# Patient Record
Sex: FEMALE | Race: WHITE | NOT HISPANIC OR LATINO | Employment: FULL TIME | URBAN - METROPOLITAN AREA
[De-identification: names, ages, dates, MRNs, and addresses within clinical notes are randomized per-mention and may not be internally consistent; named-entity substitution may affect disease eponyms.]

---

## 2017-05-15 RX ORDER — NORGESTIMATE AND ETHINYL ESTRADIOL 0.25-0.035
1 KIT ORAL DAILY
Qty: 84 TABLET | Refills: 0 | Status: SHIPPED | OUTPATIENT
Start: 2017-05-15 | End: 2017-08-08

## 2017-05-15 NOTE — TELEPHONE ENCOUNTER
----- Message from Cheyenne Hawthorne sent at 5/15/2017 11:20 AM CDT -----  Contact: Self  Pt is calling in regards of getting her RX MONONESSA, 28 refilled if possible. Pt isnt due for her annual til July. The pt would like her RX sent to [facility] on [address]. The pt can be reached at [phone]. Thanks KG

## 2017-05-18 ENCOUNTER — LAB VISIT (OUTPATIENT)
Dept: LAB | Facility: HOSPITAL | Age: 37
End: 2017-05-18
Attending: INTERNAL MEDICINE
Payer: COMMERCIAL

## 2017-05-18 ENCOUNTER — PATIENT MESSAGE (OUTPATIENT)
Dept: INTERNAL MEDICINE | Facility: CLINIC | Age: 37
End: 2017-05-18

## 2017-05-18 ENCOUNTER — OFFICE VISIT (OUTPATIENT)
Dept: INTERNAL MEDICINE | Facility: CLINIC | Age: 37
End: 2017-05-18
Payer: COMMERCIAL

## 2017-05-18 VITALS
WEIGHT: 229.25 LBS | BODY MASS INDEX: 34.75 KG/M2 | HEIGHT: 68 IN | DIASTOLIC BLOOD PRESSURE: 70 MMHG | SYSTOLIC BLOOD PRESSURE: 118 MMHG

## 2017-05-18 DIAGNOSIS — E66.9 OBESITY, CLASS II, BMI 35-39.9: ICD-10-CM

## 2017-05-18 DIAGNOSIS — Z00.00 ANNUAL PHYSICAL EXAM: ICD-10-CM

## 2017-05-18 DIAGNOSIS — Z00.00 ANNUAL PHYSICAL EXAM: Primary | ICD-10-CM

## 2017-05-18 PROBLEM — J30.1 SEASONAL ALLERGIC RHINITIS DUE TO POLLEN: Status: ACTIVE | Noted: 2017-05-18

## 2017-05-18 LAB
25(OH)D3+25(OH)D2 SERPL-MCNC: 39 NG/ML
ALBUMIN SERPL BCP-MCNC: 3.8 G/DL
ALP SERPL-CCNC: 46 U/L
ALT SERPL W/O P-5'-P-CCNC: 24 U/L
ANION GAP SERPL CALC-SCNC: 8 MMOL/L
AST SERPL-CCNC: 24 U/L
BASOPHILS # BLD AUTO: 0.02 K/UL
BASOPHILS NFR BLD: 0.4 %
BILIRUB SERPL-MCNC: 0.6 MG/DL
BUN SERPL-MCNC: 12 MG/DL
CALCIUM SERPL-MCNC: 9.6 MG/DL
CHLORIDE SERPL-SCNC: 106 MMOL/L
CHOLEST/HDLC SERPL: 2.7 {RATIO}
CO2 SERPL-SCNC: 26 MMOL/L
CREAT SERPL-MCNC: 0.8 MG/DL
DIFFERENTIAL METHOD: ABNORMAL
EOSINOPHIL # BLD AUTO: 0 K/UL
EOSINOPHIL NFR BLD: 0.7 %
ERYTHROCYTE [DISTWIDTH] IN BLOOD BY AUTOMATED COUNT: 13.6 %
EST. GFR  (AFRICAN AMERICAN): >60 ML/MIN/1.73 M^2
EST. GFR  (NON AFRICAN AMERICAN): >60 ML/MIN/1.73 M^2
GLUCOSE SERPL-MCNC: 82 MG/DL
HCT VFR BLD AUTO: 37.6 %
HDL/CHOLESTEROL RATIO: 37.1 %
HDLC SERPL-MCNC: 194 MG/DL
HDLC SERPL-MCNC: 72 MG/DL
HGB BLD-MCNC: 12.5 G/DL
LDLC SERPL CALC-MCNC: 107 MG/DL
LYMPHOCYTES # BLD AUTO: 2.7 K/UL
LYMPHOCYTES NFR BLD: 48.9 %
MCH RBC QN AUTO: 29.5 PG
MCHC RBC AUTO-ENTMCNC: 33.2 %
MCV RBC AUTO: 89 FL
MONOCYTES # BLD AUTO: 0.5 K/UL
MONOCYTES NFR BLD: 8.4 %
NEUTROPHILS # BLD AUTO: 2.3 K/UL
NEUTROPHILS NFR BLD: 41.6 %
NONHDLC SERPL-MCNC: 122 MG/DL
PLATELET # BLD AUTO: 293 K/UL
PMV BLD AUTO: 10 FL
POTASSIUM SERPL-SCNC: 4 MMOL/L
PROT SERPL-MCNC: 7.4 G/DL
RBC # BLD AUTO: 4.24 M/UL
SODIUM SERPL-SCNC: 140 MMOL/L
TRIGL SERPL-MCNC: 75 MG/DL
TSH SERPL DL<=0.005 MIU/L-ACNC: 1.35 UIU/ML
WBC # BLD AUTO: 5.6 K/UL

## 2017-05-18 PROCEDURE — 80053 COMPREHEN METABOLIC PANEL: CPT

## 2017-05-18 PROCEDURE — 36415 COLL VENOUS BLD VENIPUNCTURE: CPT

## 2017-05-18 PROCEDURE — 80061 LIPID PANEL: CPT

## 2017-05-18 PROCEDURE — 99395 PREV VISIT EST AGE 18-39: CPT | Mod: S$GLB,,, | Performed by: INTERNAL MEDICINE

## 2017-05-18 PROCEDURE — 84443 ASSAY THYROID STIM HORMONE: CPT

## 2017-05-18 PROCEDURE — 82306 VITAMIN D 25 HYDROXY: CPT

## 2017-05-18 PROCEDURE — 99999 PR PBB SHADOW E&M-EST. PATIENT-LVL III: CPT | Mod: PBBFAC,,, | Performed by: INTERNAL MEDICINE

## 2017-05-18 PROCEDURE — 85025 COMPLETE CBC W/AUTO DIFF WBC: CPT

## 2017-05-18 RX ORDER — PHENTERMINE HYDROCHLORIDE 37.5 MG/1
37.5 TABLET ORAL
Qty: 30 TABLET | Refills: 0 | Status: SHIPPED | OUTPATIENT
Start: 2017-05-18 | End: 2017-06-13 | Stop reason: SDUPTHER

## 2017-05-18 RX ORDER — LEVOCETIRIZINE DIHYDROCHLORIDE 5 MG/1
5 TABLET, FILM COATED ORAL NIGHTLY
Qty: 30 TABLET | Refills: 11 | Status: SHIPPED | OUTPATIENT
Start: 2017-05-18 | End: 2021-05-04

## 2017-05-18 NOTE — MR AVS SNAPSHOT
[facility]  [address]  [place] [address]  Phone: [phone]  Fax: [phone]                  Antonia Car   2017 11:00 AM   Office Visit    Description:  Female : 1980   Provider:  Cherry Matute MD   Department:  [facility]           Reason for Visit     Annual Exam           Diagnoses this Visit        Comments    Annual physical exam    -  Primary     Obesity, Class II, BMI 35-39.9                To Do List           Future Appointments        Provider Department Dept Phone    2017 11:50 AM LAB, APPOINTMENT [facility] [phone]      Goals (5 Years of Data)     None       These Medications        Disp Refills Start End    levocetirizine (XYZAL) 5 MG tablet 30 tablet 11 2017    Take 1 tablet (5 mg total) by mouth every evening. - Oral    Pharmacy: [facility] [address] [place], [place] [address] #: [phone]       phentermine (ADIPEX-P) 37.5 mg tablet 30 tablet 0 2017    Take 1 tablet (37.5 mg total) by mouth before breakfast. - Oral    Pharmacy: [facility] [address] [place], [place] [address] [facility] #: [phone]         [facility]     [facility] Care Line -  Assistance  Unless otherwise directed by your provider, please contact [facility], our nurse care line that is available for / assistance.     Registered nurses in the [facility] provide: appointment scheduling, clinical advisement, health education, and other advisory services.  Call: [phone] (toll free)               Medications           Message regarding Medications     Verify the changes and/or additions to your medication regime listed below are the same as discussed with your clinician today.  If any of these changes or additions are incorrect, please notify your  "healthcare provider.        START taking these NEW medications        Refills    levocetirizine (XYZAL) 5 MG tablet 11    Sig: Take 1 tablet (5 mg total) by mouth every evening.    Class: Normal    Route: Oral    phentermine (ADIPEX-P) 37.5 mg tablet 0    Sig: Take 1 tablet (37.5 mg total) by mouth before breakfast.    Class: Print    Route: Oral      STOP taking these medications     escitalopram oxalate (LEXAPRO) 10 MG tablet Take 1 tablet (10 mg total) by mouth every evening.    alprazolam (XANAX) 0.5 MG tablet Take 1 tablet (0.5 mg total) by mouth as directed. No more than 3 x weekly           Verify that the below list of medications is an accurate representation of the medications you are currently taking.  If none reported, the list may be blank. If incorrect, please contact your healthcare provider. Carry this list with you in case of emergency.           Current Medications     norgestimate-ethinyl estradiol (MONONESSA, 28,) 0.25-35 mg-mcg per tablet Take 1 tablet by mouth once daily.    levocetirizine (XYZAL) 5 MG tablet Take 1 tablet (5 mg total) by mouth every evening.    phentermine (ADIPEX-P) 37.5 mg tablet Take 1 tablet (37.5 mg total) by mouth before breakfast.           Clinical Reference Information           Your Vitals Were     BP Height Weight BMI       118/70 5' 8" (1.727 m) 104 kg (229 lb 4.5 oz) 34.86 kg/m2       Blood Pressure          Most Recent Value    BP  118/70      Allergies as of 5/18/2017     No Known Allergies      Immunizations Administered on Date of Encounter - 5/18/2017     None      Orders Placed During Today's Visit      Normal Orders This Visit    Ambulatory consult to Bariatric Surgery     Future Labs/Procedures Expected by Expires    CBC auto differential  5/18/2017 5/18/2018    Comprehensive metabolic panel  5/18/2017 5/18/2018    Lipid panel  5/18/2017 5/18/2018    TSH  5/18/2017 5/18/2018    Vitamin D  5/18/2017 (Approximate) 5/18/2018      Language Assistance Services  "    ATTENTION: Language assistance services are available, free of charge. Please call [phone].      ATENCIÓN: Si habla sammieañol, tiene a ortega disposición servicios gratuitos de asistencia lingüística. Llame al [phone].     CHÚ Ý: N?u b?n nói Ti?ng Vi?t, có các d?ch v? h? tr? ngôn ng? mi?n phí dành cho b?n. G?i s? [phone].         [facility] complies with applicable Federal civil rights laws and does not discriminate on the basis of race, color, national origin, age, disability, or sex.

## 2017-05-18 NOTE — PROGRESS NOTES
Subjective:       Patient ID: Antonia Car is a 37 y.o. female.    Chief Complaint: Annual Exam    HPI Comments: Annual exam    Overall doing well. 1 son Darrin age 9, healthy.  She works as a veterinary nurse.  Never smoked.    Main issue is her obesity.  This was discussed.  She has no apnea symptoms.  She would like to be seen in the bariatric clinic.  Lifestyle modification was discussed and we will do a one month medication for weight loss.    Labs to be obtained.    Patient Active Problem List:     Obesity, Class II, BMI 35-39.9     Fibromyalgia     Adjustment disorder with mixed anxiety and depressed mood     Seasonal allergic rhinitis due to pollen      Review of Systems   Constitutional: Negative for activity change, appetite change, chills, fatigue and fever.   HENT: Negative for nosebleeds, sinus pressure and sore throat.         Allergy sx   Eyes: Negative for visual disturbance.   Respiratory: Negative for apnea, cough, chest tightness, shortness of breath and wheezing.    Cardiovascular: Negative for chest pain, palpitations and leg swelling.   Gastrointestinal: Negative for abdominal distention, abdominal pain, anal bleeding, blood in stool, constipation, diarrhea, nausea and vomiting.   Genitourinary: Negative for dysuria, frequency, hematuria and vaginal bleeding.   Musculoskeletal: Negative for gait problem, joint swelling and myalgias.   Skin: Negative for rash.   Neurological: Negative for dizziness, tremors, weakness, light-headedness and headaches.   Hematological: Negative for adenopathy. Does not bruise/bleed easily.   Psychiatric/Behavioral: Negative for confusion, hallucinations, sleep disturbance and suicidal ideas.        Mood good       Objective:      Physical Exam   Constitutional: She is oriented to person, place, and time. She appears well-developed and well-nourished.   HENT:   Head: Normocephalic and atraumatic.   Right Ear: External ear normal.   Left Ear: External  ear normal.   Nose: Nose normal.   Mouth/Throat: Oropharynx is clear and moist. No oropharyngeal exudate.   Eyes: Conjunctivae and EOM are normal. No scleral icterus.   Neck: Normal range of motion. Neck supple. No JVD present. No thyromegaly present.   Cardiovascular: Normal rate, regular rhythm, normal heart sounds and intact distal pulses.  Exam reveals no gallop.    No murmur heard.  Pulmonary/Chest: Effort normal and breath sounds normal. No respiratory distress. She has no wheezes. She exhibits no tenderness.   Abdominal: Soft. Bowel sounds are normal. She exhibits no distension and no mass. There is no tenderness. There is no rebound and no guarding.   Musculoskeletal: Normal range of motion. She exhibits no edema or tenderness.   Lymphadenopathy:     She has no cervical adenopathy.   Neurological: She is alert and oriented to person, place, and time. No cranial nerve deficit. Coordination normal.   Skin: Skin is warm. No rash noted. No erythema.   Psychiatric: She has a normal mood and affect. Her behavior is normal. Judgment and thought content normal.   Nursing note and vitals reviewed.      Assessment:       1. Annual physical exam    2. Obesity, Class II, BMI 35-39.9        Plan:         Annual physical exam  -     Comprehensive metabolic panel; Future; Expected date: 5/18/17  -     CBC auto differential; Future; Expected date: 5/18/17  -     Lipid panel; Future; Expected date: 5/18/17  -     Vitamin D; Future; Expected date: 5/18/17  -     TSH; Future; Expected date: 5/18/17    Obesity, Class II, BMI 35-39.9  -     Ambulatory consult to Bariatric Surgery    Other orders  -     levocetirizine (XYZAL) 5 MG tablet; Take 1 tablet (5 mg total) by mouth every evening.  Dispense: 30 tablet; Refill: 11  -     phentermine (ADIPEX-P) 37.5 mg tablet; Take 1 tablet (37.5 mg total) by mouth before breakfast.  Dispense: 30 tablet; Refill: 0.  Cautions and side effects reviewed including hypertension, tachycardia,  anxiety.  May try half pills or alternating days, follow-up poor results.  Keep bariatric clinic follow-up.    lifestyle modification, portion control, exercise, hydration reviewed  Keep a food diary before going to bariatric clinic  No VIOLETTA symptoms    I will review all studies and determine further tx depending on findings    Anticipate annual follow-up with me unless need arises sooner    T judy recommended

## 2017-05-18 NOTE — PATIENT INSTRUCTIONS
Phentermine tablets or capsules  What is this medicine?  PHENTERMINE (FEN ter meen) decreases your appetite. It is used with a reduced calorie diet and exercise to help you lose weight.  How should I use this medicine?  Take this medicine by mouth with a glass of water. Follow the directions on the prescription label. The instructions for use may differ based on the product and dose you are taking. Avoid taking this medicine in the evening. It may interfere with sleep. Take your doses at regular intervals. Do not take your medicine more often than directed.  Talk to your pediatrician regarding the use of this medicine in children. While this drug may be prescribed for children 17 years or older for selected conditions, precautions do apply.  What side effects may I notice from receiving this medicine?  Side effects that you should report to your doctor or health care professional as soon as possible:  · chest pain, palpitations  · depression or severe changes in mood  · increased blood pressure  · irritability  · nervousness or restlessness  · severe dizziness  · shortness of breath  · problems urinating  · unusual swelling of the legs  · vomiting  Side effects that usually do not require medical attention (report to your doctor or health care professional if they continue or are bothersome):  · blurred vision or other eye problems  · changes in sexual ability or desire  · constipation or diarrhea  · difficulty sleeping  · dry mouth or unpleasant taste  · headache  · nausea  What may interact with this medicine?  Do not take this medicine with any of the following medications:  · duloxetine  · MAOIs like Carbex, Eldepryl, Marplan, Nardil, and Parnate  · medicines for colds or breathing difficulties like pseudoephedrine or phenylephrine  · procarbazine  · sibutramine  · SSRIs like citalopram, escitalopram, fluoxetine, fluvoxamine, paroxetine, and sertraline  · stimulants like dexmethylphenidate, methylphenidate or  modafinil  · venlafaxine  This medicine may also interact with the following medications:  · medicines for diabetes  What if I miss a dose?  If you miss a dose, take it as soon as you can. If it is almost time for your next dose, take only that dose. Do not take double or extra doses.  Where should I keep my medicine?  Keep out of the reach of children. This medicine can be abused. Keep your medicine in a safe place to protect it from theft. Do not share this medicine with anyone. Selling or giving away this medicine is dangerous and against the law.  This medicine may cause accidental overdose and death if taken by other adults, children, or pets. Mix any unused medicine with a substance like cat litter or coffee grounds. Then throw the medicine away in a sealed container like a sealed bag or a coffee can with a lid. Do not use the medicine after the expiration date.  Store at room temperature between 20 and 25 degrees C (68 and 77 degrees F). Keep container tightly closed.  What should I tell my health care provider before I take this medicine?  They need to know if you have any of these conditions:  · agitation  · glaucoma  · heart disease  · high blood pressure  · history of substance abuse  · lung disease called Primary Pulmonary Hypertension (PPH)  · taken an MAOI like Carbex, Eldepryl, Marplan, Nardil, or Parnate in last 14 days  · thyroid disease  · an unusual or allergic reaction to phentermine, other medicines, foods, dyes, or preservatives  · pregnant or trying to get pregnant  · breast-feeding  What should I watch for while using this medicine?  Notify your physician immediately if you become short of breath while doing your normal activities.  Do not take this medicine within 6 hours of bedtime. It can keep you from getting to sleep. Avoid drinks that contain caffeine and try to stick to a regular bedtime every night.  This medicine was intended to be used in addition to a healthy diet and exercise. The  best results are achieved this way. This medicine is only indicated for short-term use. Eventually your weight loss may level out. At that point, the drug will only help you maintain your new weight. Do not increase or in any way change your dose without consulting your doctor.  You may get drowsy or dizzy. Do not drive, use machinery, or do anything that needs mental alertness until you know how this medicine affects you. Do not stand or sit up quickly, especially if you are an older patient. This reduces the risk of dizzy or fainting spells. Alcohol may increase dizziness and drowsiness. Avoid alcoholic drinks.  Date Last Reviewed:   NOTE:This sheet is a summary. It may not cover all possible information. If you have questions about this medicine, talk to your doctor, pharmacist, or health care provider. Copyright© 2016 [employer] Standard

## 2017-06-13 RX ORDER — PHENTERMINE HYDROCHLORIDE 37.5 MG/1
37.5 TABLET ORAL
Qty: 30 TABLET | Refills: 0 | Status: SHIPPED | OUTPATIENT
Start: 2017-06-13 | End: 2017-07-13

## 2017-06-23 ENCOUNTER — INITIAL CONSULT (OUTPATIENT)
Dept: BARIATRICS | Facility: CLINIC | Age: 37
End: 2017-06-23
Payer: COMMERCIAL

## 2017-06-23 VITALS
WEIGHT: 223.31 LBS | HEIGHT: 68 IN | SYSTOLIC BLOOD PRESSURE: 118 MMHG | DIASTOLIC BLOOD PRESSURE: 76 MMHG | HEART RATE: 82 BPM | BODY MASS INDEX: 33.84 KG/M2

## 2017-06-23 DIAGNOSIS — E66.9 OBESITY (BMI 30.0-34.9): Primary | ICD-10-CM

## 2017-06-23 PROCEDURE — 99999 PR PBB SHADOW E&M-EST. PATIENT-LVL III: CPT | Mod: PBBFAC,,, | Performed by: INTERNAL MEDICINE

## 2017-06-23 PROCEDURE — 99244 OFF/OP CNSLTJ NEW/EST MOD 40: CPT | Mod: S$GLB,,, | Performed by: INTERNAL MEDICINE

## 2017-06-23 RX ORDER — DIETHYLPROPION HYDROCHLORIDE 75 MG/1
75 TABLET, EXTENDED RELEASE ORAL DAILY
Qty: 30 TABLET | Refills: 0 | Status: SHIPPED | OUTPATIENT
Start: 2017-06-23 | End: 2017-08-17

## 2017-06-23 NOTE — PATIENT INSTRUCTIONS
Intermittent fasting. NO eating from 8 pm to 12pm. Can have water, tea, coffee in that time without sweeteners.    2 meals made up of the following:  Unlimited green vegetables, tomatoes, mushrooms, spaghetti squash, cauliflower, meat, poultry, seafood, eggs and hard cheeses.   Avoid fried foods  Dressings, seasonings, condiments, etc should have less than 2 g sugars.   Beans or nuts can have 1 x a day.   1-2 servings of citrus fruits, berries, pineapple or melon a day (1/2 cup)  Milk and plain yogurt    No soda, sweet tea, juices or lemonade    No grains, rice, pasta, potatoes or bread.     [email or web address] for info on intermittent fasting.       Start phentermine with 1/2 pill twice a day to see if that will control your appetite.       When done with phentermine Rx start diethylpropion    Patient warned of common side effects of diethylpropion including anxiety, insomnia, palpitations and increased blood pressure. It was also explained that it is for short-term usage along with diet and exercise, and that stopping the medication without making lifestyle changes will result in regain of weight. Patient states understanding.      Increase exercise to 3 days a week this month.

## 2017-06-23 NOTE — PROGRESS NOTES
Subjective:       Patient ID: Antonia Car is a 37 y.o. female.    Chief Complaint: Consult    Current attempts at weight loss: New pt to me, referred by Cherry Matute MD  [address]  [place] [address]  With Patient Active Problem List:     Obesity, Class II, BMI 35-39.9     Fibromyalgia     Adjustment disorder with mixed anxiety and depressed mood     Seasonal allergic rhinitis due to pollen    Has been on phentermine with Cherry Matute MD for the past 6 weeks and lost about 6 lbs. She feels it has helped, but wears off in afternoon. Denies SE.   Walks and elliptical 1-2 times a week for 45 min.     Previous diet attempts:  Advocare, Body by VI. OTC supplements. Krav Stephan. Cutting back soda and fried foods.     Heaviest weight: 254#    Lightest weight: 164#    Goal weight: 180#    History of medication for loss: Phentermine as above. Belviq- gave headaches      Typical eating patterns:  and 10 yo son. Pt does cooking.   Breakfast:  Skips. Occasional eggs.     Lunch: Salad or leftovers. Weekends- pizza, maybe salad if available.     Dinner: Eats out more- blackened catfish with peas and salad. Shrimp and fish sometimes fried or pasta with garlic butter.     Snacks: smart pop, belvita, pineapple.     Beverages: water, wine- 3-4 glasses/week. Occasional Coke. Rohini tea with splenda.     Willingness to change: 10/10    EKG:Sinus bradycardia  Otherwise normal ECG  No previous ECGs available    BMR: [id number]        Review of Systems   Constitutional: Negative for chills and fever.   Respiratory: Negative for shortness of breath.         + snores   Cardiovascular: Positive for leg swelling. Negative for chest pain.        And hands   Gastrointestinal: Positive for constipation. Negative for diarrhea.        Occasional HB   Genitourinary: Negative for difficulty urinating and menstrual problem.        On OCPS   Musculoskeletal: Positive for arthralgias and back pain.        Knees and  "upper back   Neurological: Negative for dizziness and light-headedness.   Psychiatric/Behavioral: Negative for dysphoric mood. The patient is nervous/anxious.        Objective:     /76   Pulse 82   Ht 5' 8" (1.727 m)   Wt 101.3 kg (223 lb 5.2 oz)   BMI 33.96 kg/m²     Physical Exam   Constitutional: She is oriented to person, place, and time. She appears well-developed. No distress.   Obese   HENT:   Head: Normocephalic and atraumatic.   Mouth/Throat: No oropharyngeal exudate.   Eyes: EOM are normal. Pupils are equal, round, and reactive to light. No scleral icterus.   Neck: Normal range of motion. Neck supple. No thyromegaly present.   Cardiovascular: Normal rate and normal heart sounds.  Exam reveals no gallop and no friction rub.    No murmur heard.  Pulmonary/Chest: Effort normal and breath sounds normal. No respiratory distress. She has no wheezes.   Abdominal: Soft. Bowel sounds are normal. She exhibits no distension. There is no tenderness.   Musculoskeletal: Normal range of motion. She exhibits no edema.   Neurological: She is alert and oriented to person, place, and time. No cranial nerve deficit.   Skin: Skin is warm and dry. No erythema.   Psychiatric: She has a normal mood and affect. Her behavior is normal. Judgment normal.   Vitals reviewed.      Assessment:       1. Obesity (BMI 30.0-34.9)        Plan:         Antonia was seen today for consult.    Diagnoses and all orders for this visit:    Obesity (BMI 30.0-34.9)  -     diethylpropion 75 mg TbSR; Take 75 mg by mouth once daily.      Intermittent fasting. NO eating from 8 pm to 12pm. Can have water, tea, coffee in that time without sweeteners.    2 meals made up of the following:  Unlimited green vegetables, tomatoes, mushrooms, spaghetti squash, cauliflower, meat, poultry, seafood, eggs and hard cheeses.   Avoid fried foods  Dressings, seasonings, condiments, etc should have less than 2 g sugars.   Beans or nuts can have 1 x a day.   1-2 " servings of citrus fruits, berries, pineapple or melon a day (1/2 cup)  Milk and plain yogurt    No soda, sweet tea, juices or lemonade    No grains, rice, pasta, potatoes or bread.     [email or web address] for info on intermittent fasting.       Start phentermine with 1/2 pill twice a day to see if that will control your appetite.       When done with phentermine Rx start diethylpropion    Patient warned of common side effects of diethylpropion including anxiety, insomnia, palpitations and increased blood pressure. It was also explained that it is for short-term usage along with diet and exercise, and that stopping the medication without making lifestyle changes will result in regain of weight. Patient states understanding.      Increase exercise to 3 days a week this month.

## 2017-06-23 NOTE — LETTER
June 23, 2017      Cherry Matute MD  [address]  [facility] [address]           [facility]  [address]  [facility] [address]  Phone: [phone]  Fax: [phone]          Patient: Antonia Car   MR Number: [id number]   YOB: 1980   Date of Visit: 6/23/2017       Dear Dr. Cherry Matute:    Thank you for referring Antonia Car to me for evaluation. Attached you will find relevant portions of my assessment and plan of care.    If you have questions, please do not hesitate to call me. I look forward to following Antonia Car along with you.    Sincerely,    Tahira Cervantes MD    Enclosure  CC:  No Recipients    If you would like to receive this communication electronically, please contact [email or web address] or [phone] to request more information on [employer] Link access.    For providers and/or their staff who would like to refer a patient to [facility], please contact us through our one-stop-shop provider referral line, [facility] [place], at [phone].    If you feel you have received this communication in error or would no longer like to receive these types of communications, please e-mail [email or web address]

## 2017-06-23 NOTE — LETTER
[facility]  [address]  [facility] [address]  Phone: [phone]  Fax: [phone] June 23, 2017      Cherry Matute MD  [address]  [facility] [address]    Patient: Antonia Car   MR Number: [id number]   YOB: 1980   Date of Visit: 6/23/2017     Dear Dr. Matute:    Thank you for referring Antonia Car to me for evaluation. Below are the relevant portions of my assessment and plan of care.    Assessment:  1. Obesity (BMI 30.0-34.9)      Plan: Start Phentermine with 1/2 pill twice a day to see if that will control your appetite. When done with Phentermine RX start Diethylpropion.    Diethylpropion 75 mg TbSR; Take 75 mg by mouth once daily.     Patient warned of common side effects of Diethylpropion including anxiety, insomnia, palpitations and increased blood pressure. It was also explained that it is for short-term usage along with diet and exercise, and that stopping the medication without making lifestyle changes will result in regain of weight. Patient states understanding.     The patient was given individualized diet, exercise, and follow-up instructions.       If you have questions, please do not hesitate to call me. I look forward to following Antonia along with you.    Sincerely,      Tahira Cervantes MD   Medical Weight Loss   [facility]     BIBI/thania

## 2017-06-25 ENCOUNTER — PATIENT MESSAGE (OUTPATIENT)
Dept: BARIATRICS | Facility: CLINIC | Age: 37
End: 2017-06-25

## 2017-07-27 ENCOUNTER — PATIENT MESSAGE (OUTPATIENT)
Dept: BARIATRICS | Facility: CLINIC | Age: 37
End: 2017-07-27

## 2017-08-08 ENCOUNTER — OFFICE VISIT (OUTPATIENT)
Dept: OBSTETRICS AND GYNECOLOGY | Facility: CLINIC | Age: 37
End: 2017-08-08
Payer: COMMERCIAL

## 2017-08-08 VITALS
WEIGHT: 221.81 LBS | HEIGHT: 68 IN | SYSTOLIC BLOOD PRESSURE: 100 MMHG | BODY MASS INDEX: 33.62 KG/M2 | DIASTOLIC BLOOD PRESSURE: 68 MMHG

## 2017-08-08 DIAGNOSIS — Z01.419 VISIT FOR GYNECOLOGIC EXAMINATION: Primary | ICD-10-CM

## 2017-08-08 DIAGNOSIS — Z30.41 ENCOUNTER FOR SURVEILLANCE OF CONTRACEPTIVE PILLS: ICD-10-CM

## 2017-08-08 PROCEDURE — 99999 PR PBB SHADOW E&M-EST. PATIENT-LVL III: CPT | Mod: PBBFAC,,, | Performed by: OBSTETRICS & GYNECOLOGY

## 2017-08-08 PROCEDURE — 99395 PREV VISIT EST AGE 18-39: CPT | Mod: S$GLB,,, | Performed by: OBSTETRICS & GYNECOLOGY

## 2017-08-08 NOTE — PROGRESS NOTES
HISTORY OF PRESENT ILLNESS:    Antonia Car is a 37 y.o. female, , Patient's last menstrual period was 2017 (exact date).,  presents for a routine exam and has no complaints. PAP DUE 2018.  REFILL OCP BUT WILL CHANGE TO NECON FOR BETTER CYCLE CONTROL - HAS HAD SOME BTB ON CURRENT PILL OVER THE PAST 6-7 MO    LAST VISIT 2016:   PAP DUE  AND REFILL OCP - MONONESSA.  NO SIGNIF GYN ISSUES AT THIS TIME.  SWITCHING LAUNDRY DETERG AND FEELS IMPROVED FROM LAST YEAR.  TO [place] SOON AND ANNIVERSARY TRIP IN November TO [place] . .   LAST VISIT 2015:  SEVERAL ISSUES:  IN PAST HAD OV CYST, THINKS RECURRENT DUE TO TWINGES ON LEFT SIDE - IN PAST BEATRIS HAD DX FUNCTIONAL CYSTS AND THIS FEELS SIMILAR. WILL F/U WITH PELVIC USG. DISCUSSED LOW-DOSE OCP SUPPRESS MOST BUT NOT ALL OVARIAN ACTIVITY; USUALLY DOES WELL WITH CURRENT OCP AND WILL NOT CHANGE, BUT IF RECURRENT EPISODES IN THE FUTURE MIGHT CONSIDER A STRONGER PILL  ABD PAIN, 2 EPISODES, 4 WEEKS AGO AND LAST WEEK, LASTED ABOUT 10 MIN, MIDLINE. HAS HX FIBROMYALGIA AND HARD TO TEASE APART WHAT THAT MIGHT BE CONTRIBUTING, BUT WILL CHECK PELVIC USG AND ADVIS CONT OCP  FEELS LIGHTHEADED ASSOCIATED WITH SEVERE PAIN EPISODES - DISCUSSED    IRREG MENSES PAST 2 MONTHS. LMP 5/5, 3-4 DAYS WITH 2 HEAVY. 4/EARLY ALSO HAD MENSES. HAD MIDCYCLE SPOTTING OVER THE PAST 2 MONTHS    URINE CULTURE SUBMITTED FOR UTI SX OVER THE PAST 3 DAYS; EMPIRIC ABX MACROBID  PAP SUBMITTED, DISCUSSED SCREENING  SPOUSE WITH RECENT INFIDELITY AND WANTS STD SCREEN FOR [facility]    Past Medical History:   Diagnosis Date    Abnormal Pap smear     ABN pap ~19 yo s/p laser cervix and since then all pap has been normal seen only Dr. Luna since    GERD (gastroesophageal reflux disease)     Seasonal allergic rhinitis due to pollen 2017       Past Surgical History:   Procedure Laterality Date     SECTION         MEDICATIONS AND ALLERGIES:      Current Outpatient  Prescriptions:     diethylpropion 75 mg TbSR, Take 75 mg by mouth once daily., Disp: 30 tablet, Rfl: 0    levocetirizine (XYZAL) 5 MG tablet, Take 1 tablet (5 mg total) by mouth every evening., Disp: 30 tablet, Rfl: 11    norethindrone-ethinyl estradiol (NECON) 0.5-35 mg-mcg per tablet, Take 1 tablet by mouth once daily., Disp: 84 tablet, Rfl: 3    Review of patient's allergies indicates:  No Known Allergies    Family History   Problem Relation Age of Onset    Cancer Maternal Grandmother      Brain    Diabetes Maternal Grandmother     Diabetes Father     Hypertension Father     Heart disease Father      A fib    Lymphoma Mother 60    Diabetes Mother     Hypertension Mother     Cancer Mother      follicular lymphoma, B lymphoma x 2    Depression Mother     No Known Problems Son     Clotting disorder Sister     Thyroid disease Sister     Breast cancer Neg Hx     Colon cancer Neg Hx     Ovarian cancer Neg Hx        Social History     Social History    Marital status:      Spouse name: N/A    Number of children: 1    Years of education: N/A     Occupational History    Not on file.     Social History Main Topics    Smoking status: Never Smoker    Smokeless tobacco: Never Used    Alcohol use No      Comment: rarely    Drug use: No    Sexual activity: Yes     Partners: Male     Birth control/ protection: OCP     Other Topics Concern    Not on file     Social History Narrative    ** Merged History Encounter **            COMPREHENSIVE GYN HISTORY:  PAP History: Denies abnormal Paps.  Infection History: Denies STDs. Denies PID.  Benign History: Denies uterine fibroids. Denies ovarian cysts. Denies endometriosis. Denies other conditions.  Cancer History: Denies cervical cancer. Denies uterine cancer or hyperplasia. Denies ovarian cancer. Denies vulvar cancer or pre-cancer. Denies vaginal cancer or pre-cancer. Denies breast cancer. Denies colon cancer.  Sexual Activity History: Reports  "currently being sexually active  Menstrual History: Monthly, mild-moderate.  Contraception:oral contraceptives (estrogen/progesterone)    ROS:  GENERAL: No weight changes. No swelling. No fatigue. No fever.  CARDIOVASCULAR: No chest pain. No shortness of breath. No leg cramps.   NEUROLOGICAL: No headaches. No vision changes.  BREASTS: No pain. No lumps. No discharge.  ABDOMEN: No pain. No nausea. No vomiting. No diarrhea. No constipation.  REPRODUCTIVE: No abnormal bleeding.   VULVA: No pain. No lesions. No itching.  VAGINA: No relaxation. No itching. No odor. No discharge. No lesions.  URINARY: No incontinence. No nocturia. No frequency. No dysuria.    /68   Ht 5' 8" (1.727 m)   Wt 100.6 kg (221 lb 12.5 oz)   LMP 07/12/2017 (Exact Date)   BMI 33.72 kg/m²     PE:  APPEARANCE: Well nourished, well developed, in no acute distress.  AFFECT: WNL, alert and oriented x 3.  SKIN: No acne or hirsutism.  NECK: Neck symmetric, without masses or thyromegaly.  NODES: No inguinal, cervical, axillary or femoral lymph node enlargement.  CHEST: Good respiratory effort.   ABDOMEN: Soft. No tenderness or masses. No hepatosplenomegaly. No hernias.  BREASTS: Symmetrical, no skin changes, visible lesions, palpable masses or nipple discharge bilaterally.  PELVIC: External female genitalia without lesions.  Female hair distribution. Adequate perineal body, Normal urethral meatus. Vagina moist and well rugated without lesions or discharge.  No significant cystocele or rectocele present. Cervix pink without lesions, discharge or tenderness. Uterus is normal size, regular, mobile and nontender. Adnexa without masses or tenderness.  EXTREMITIES: No edema    PROCEDURES:      DIAGNOSIS:  1. Visit for gynecologic examination     2. Encounter for surveillance of contraceptive pills         LABS AND TESTS ORDERED:    MEDICATIONS PRESCRIBED:    COUNSELING:   The patient was counseled today on ACS PAP guidelines, with recommendations for " yearly pelvic exams unless their uterus, cervix, and ovaries were removed for benign reasons; in that case, examinations every 3-5 years are recommended.  The patient was also counseled regarding monthly breast self-examination, routine STD screening for at-risk populations, prophylactic immunizations for transmitted infections such as  HPV, Pertussis, or Influenza as appropriate, and yearly mammograms when indicated by ACS guidelines.  She was advised to see her primary care physician for all other health maintenance.    FOLLOW-UP with me annually.

## 2017-08-17 ENCOUNTER — OFFICE VISIT (OUTPATIENT)
Dept: BARIATRICS | Facility: CLINIC | Age: 37
End: 2017-08-17
Payer: COMMERCIAL

## 2017-08-17 VITALS
HEART RATE: 76 BPM | HEIGHT: 68 IN | DIASTOLIC BLOOD PRESSURE: 72 MMHG | BODY MASS INDEX: 33.71 KG/M2 | WEIGHT: 222.44 LBS | SYSTOLIC BLOOD PRESSURE: 110 MMHG

## 2017-08-17 DIAGNOSIS — E66.9 OBESITY (BMI 30.0-34.9): Primary | ICD-10-CM

## 2017-08-17 PROBLEM — E66.811 OBESITY (BMI 30.0-34.9): Status: ACTIVE | Noted: 2017-08-17

## 2017-08-17 PROCEDURE — 3008F BODY MASS INDEX DOCD: CPT | Mod: S$GLB,,, | Performed by: INTERNAL MEDICINE

## 2017-08-17 PROCEDURE — 99213 OFFICE O/P EST LOW 20 MIN: CPT | Mod: S$GLB,,, | Performed by: INTERNAL MEDICINE

## 2017-08-17 PROCEDURE — 99999 PR PBB SHADOW E&M-EST. PATIENT-LVL III: CPT | Mod: PBBFAC,,, | Performed by: INTERNAL MEDICINE

## 2017-08-17 RX ORDER — TOPIRAMATE 50 MG/1
50 CAPSULE, EXTENDED RELEASE ORAL DAILY
Qty: 30 CAPSULE | Refills: 2 | Status: SHIPPED | OUTPATIENT
Start: 2017-08-17 | End: 2017-10-19

## 2017-08-17 NOTE — PATIENT INSTRUCTIONS
Patient was informed that topiramate is used for migraine prevention and seizures. Weight loss is a common side effect that is well documented. She understands this. She was informed of the potential side effects such as serious and possibly fatal rash in which case the medication should be discontinued immediately. Paresthesias, forgetfulness, fatigue, kidney stones, GI symptoms, and changes in lab values such as electrolytes, blood counts and kidney function.    Intermittent fasting. NO eating from 8 pm to 12pm. Can have water, tea, coffee in that time without sweeteners.    2 meals made up of the following:  Unlimited green vegetables, tomatoes, mushrooms, spaghetti squash, cauliflower, meat, poultry, seafood, eggs and hard cheeses.   Avoid fried foods  Dressings, seasonings, condiments, etc should have less than 2 g sugars.   Beans or nuts can have 1 x a day.   1-2 servings of citrus fruits, berries, pineapple or melon a day (1/2 cup)  Milk and plain yogurt    No soda, sweet tea, juices or lemonade    No grains, rice, pasta, potatoes or bread.     [email or web address].[email or web address] for info on intermittent fasting.       Lower Carb Comfort Food Dupes      Skinny Bell Pepper Armando Boats  Yields: 18 boats  Servin boats  Calories: 145  Total Fat: 9g  Saturated Fat: 4g  Trans Fat: 0g  Cholesterol: 50mg  Sodium: 293mg  Carbohydrates: 4g  Fiber: 1g  Sugars: 2g  Protein: 13g  SmartPoints: 4     Ingredients   1 pound lean ground turkey   1 teaspoons chili powder   1 teaspoon cumin   1/2 teaspoon black pepper   1/4 teaspoon kosher or sea salt   3/4 cup salsa, no sugar added   1 cup grated cheddar cheese, reduced-fat   3 bell peppers  Directions  Remove seeds, core, and membrane from bell peppers then slice each one into 6 verticle pieces where they dip down. Set sliced bell peppers aside.  Cook ground turkey over medium-high heat, breaking up as it cooks. Cook until the turkey loses it's pink color and is cooked  through. Drain off any fat.  Preheat oven to 375 degrees.  Combine cooked turkey with spices and salsa. Evenly distribute mixture into the bell pepper boats, top with cheese. Bake on a parchment lined baking sheet for 10 minutes or until cheese is melted and peppers are hot.  NOTE: If you prefer much softer bell peppers, add a few tablespoons water to the bottom of a large casserole dish, add filled nachos, cover tightly with foil and bake 15 minutes.  Remove from the oven and add additional toppings, If desired.  Optional ingredients: sliced Jalepeno peppers, diced avocado, fat-free Greek yogurt or sour cream, or sliced green onions.    [place] Chicken Spaghetti Squash  Yields: 4 servings  Calories: 457  Total Fat: 23g  Saturated Fat: 8g  Trans Fat: 0g  Cholesterol: 201mg  Sodium: 1146mg  Carbohydrates: 19g  Fiber: 4g  Sugar: 9g  Protein: 44g  SmartPoints: 13    Ingredients   1 large spaghetti squash   1 small onion, diced   2 medium carrots, diced   2 celery stalks, diced   1 pound cooked chicken, shredded   1/2 cup hot sauce   1/4 cup Homemade Ranch dressing   1/2 teaspoon garlic powder   salt and pepper to taste   1 cup low-fat shredded cheddar cheese   2 eggs   1/4 cup green onion, chopped  Directions  Preheat oven to 400 degrees F and spray a baking sheet with cooking spray.  Slice your spaghetti squash in half lengthwise and scoop out the seeds, then spray the cut side of the squash with a little olive oil cooking spray and place cut side down on the baking pan.  Roast spaghetti squash for 30-45 minutes or until it is tender.  While the squash is cooking, sauté the onion, celery, and carrots until softened and mostly cooked through.  In a large bowl, combine the sauteed vegetables, chicken, hot sauce, ranch dressing, garlic powder, salt, pepper, eggs, and cheese.  Once the squash is cooked through, allow to cool slightly then use a fork to scrape the insides into your chicken mixture,  making sure not to tear the skins.  Make sure that the spaghetti squash is well incorporated with the other ingredients, then divide the mixture between the spaghetti squash halves.  Bake at 350 degrees for 30-35 minutes, or until hot and bubbly.  Remove from the oven and garnish with the green onions and additional ranch or hot sauce if desired.    Sasha Zucchini Boats  Servings: 8 zucchini boats  Ingredients   Report this ad    4 medium zucchini (2 1/2 lbs), sliced into halves through the length*   1 cup (8.6 oz) part-skim ricotta cheese   1 large egg   1 1/2 Tbsp chopped fresh parsley , plus more for garnish   1 1/4 cups (5 oz) shredded mozzarella cheese   1/2 cup (2 oz) finely shredded parmesan cheese   8 oz 93% lean ground beef or lean ground turkey   4 tsp olive oil , divided   Salt and freshly ground black pepper   1 3/4 cup roasted garlic marinara sauce (low sugar)   1 Tbsp chopped fresh basil , plus more for garnish  Instructions  1. Preheat oven to 400 degrees. Using a spoon, scoop centers from zucchini while leaving a 1/4-inch rim to create boats. Set aside.  2. In a mixing bowl stir together ricotta cheese, egg and 1 1/2 Tbsp of the parsley. Season lightly with salt and pepper. Stir in 1/2 cup of the mozzarella cheese and the parmesan cheese. Set aside.  3. Heat 2 tsp of the olive oil in a large non-stick skillet over medium-high heat. Crumble beef into pan, season with salt and pepper and cook, stirring occasionally and breaking up beef when stirring, until browned (there shouldn't be any excess fat but if you happened to use a fattier beef then just drain excess rendered fat). Stir in marinara sauce and 1 Tbsp of the basil, remove from heat.  4. To assemble boats, brush both sides of of zucchini lightly with remaining 2 tsp olive oil and place in two baking pans (I used a 13 by 9 and a 9 by 9). Divide cheese mixture among zucchini spooning about 2 1/2 Tbsp into each, then spread cheese  mixture into and even layer. Divide sauce among zucchini adding a few heaping spoonfuls to each. Cover baking dishes with foil and place in oven side by side and bake in preheated oven 30 minutes. Remove from oven, sprinkle tops with remaining 3/4 cup mozzarella, return to oven and bake until cheese has melted and zucchini is tender, about 5 minutes. Sprinkle tops with with fresh basil and parsley and serve warm.  5. *Look for zucchini that is wider and more uniform in width. The skinnier zucchini won't fit much filling.  6. Recipe source: [employer]    Chicken Avocado Lime Soup  Prep Time: 15 minutes  Cook Time: 20 minutes  Ingredients   Report this ad    1 1/2 lbs boneless skinless chicken breasts*   1 Tbsp olive oil   1 cup chopped green onions (including whites, mince the whites)   2 jalapeños , seeded and minced (leave seeds if you want soup spicy, omit if you don't like heat)   2 cloves garlic , minced   4 (14.5 oz) cans low-sodium chicken broth   2 Elvira tomatoes , seeded and diced   1/2 tsp ground cumin   Salt and freshly ground black pepper   1/3 cup chopped cilantro   3 Tbsp fresh lime juice   3 medium avocados , peeled, cored and diced   Tortilla chips , mitchell alvina cheese, sour cream for serving (optional)    Instructions  1. In a large pot heat 1 Tbsp olive oil over medium heat. Once hot, add green onions and jalapenos and saute until tender, about 2 minutes, adding garlic during last 30 seconds of sauteing. Add chicken broth, tomatoes, cumin, season with salt and pepper to taste and add chicken breasts. Bring mixture to a boil over medium-high heat. Then reduce heat to medium, cover with lid and allow to cook, stirring occasionally, until chicken has cooked through 10 - 15 minutes (cook time will vary based on thickness of chicken breasts). Reduce burner to warm heat, remove chicken from pan and let rest on a cutting board 5 minutes, then shred chicken and return to soup. Stir in  "cilantro and lime juice. Add avocados to soup just before serving (if you don't plan on serving the soup right away, I would recommend adding the avocados to each bowl individually, about 1/2 an avocado per serving). Serve with tortilla chips, cheese and sour cream if desired.  2. *For thicker chicken breasts, cut breasts in half through the length (thickness) of the breasts, they will cook faster and more evenly.  3. Recipe Source: adapted slightly from [facility]        CAULIFLOWER "MAC" AND CHEESE    INGREDIENTS   1 small head cauliflower cut into small florets about 5-6 cups   1/2 small onion, diced   1 teaspoon olive oil   Kosher salt   Freshly ground black pepper   2 tablespoons parsley   1 teaspoon paprika   2 tablespoons butter   2 tablespoons flour   1 1/4 cups milk, (whole milk or coconut is best)   1/2 teaspoon granulated garlic   1 teaspoon mustard (optional)   1/2 teaspoon paprika   2 1/2 cups grated extra sharp cheddar cheese (reserve 1/2 cup)     PREPARATION  1. Preheat oven to 400°F. Place cauliflower florets on a baking sheet, mix with diced onion and oil, sprinkle with salt and pepper. Roast for 20-25 minutes tossing half way through until browned.  2. Warm the milk in the microwave, so it is just heated through (this helps prevent the cheese sauce from clumping). Heat a medium saucepan over medium med-high heat. Add 2 tablespoons butter and flour then whisk for 2 minutes (until a smooth candido is made), add milk and continue whisking until sauce thickens. Once smooth add salt and pepper and other spices. Then add the cheese reserving 1/2 cup. Mix with spatula and add cauliflower, stir gently. Now add the reserved cheese, mix just enough to evenly distribute.   4. Place mixture into a 8x8 inch greased casserole dish and cover with paprika and parsley. Bake in oven for 20 minutes until toasty and bubbly.    "

## 2017-08-17 NOTE — PROGRESS NOTES
"Subjective:       Patient ID: Antonia Car is a 37 y.o. female.    Chief Complaint: Follow-up    Pt here today for follow-up. Has lost 1 lbs. Pt sent message below regarding meds. Pt was asked to take medication only as directed and advised that she would not get rf after running out of medication early because she took more than instructed. She also had some concern that exercise makes her hungrier.     She is walking and doing ellipitical and will playing volley ball. She did do the intermittent fasting.     Sent via [facility]: "I've been taking the new medication fit about 2 weeks and I don't feel it's working very well. I finished the Phentermine...I took 1/2 in the morning and 1/2 in the afternoon but that didn't seem to make a difference.  Then I  took 1 in the morning and 1/2 in the afternoon and that seemed to work pretty good. I will need a refill in about a week or so if you want me to finish out the current medication but if it's ok to switch back to the higher dose of Phentermine or you want to switch it all together let me know. "        Review of Systems   Constitutional: Negative for chills and fever.   Respiratory: Negative for shortness of breath.         + snores   Cardiovascular: Positive for leg swelling. Negative for chest pain.        And hands   Gastrointestinal: Positive for constipation. Negative for diarrhea.        Occasional HB   Genitourinary: Negative for difficulty urinating and menstrual problem.        On OCPS   Musculoskeletal: Positive for arthralgias and back pain.        Knees and upper back   Neurological: Negative for dizziness and light-headedness.   Psychiatric/Behavioral: Negative for dysphoric mood. The patient is nervous/anxious.        Objective:     /72   Pulse 76   Ht 5' 8" (1.727 m)   Wt 100.9 kg (222 lb 7.1 oz)   LMP 07/12/2017 (Exact Date)   BMI 33.82 kg/m²     Physical Exam   Constitutional: She is oriented to person, place, and time. She " appears well-developed. No distress.   Obese   HENT:   Head: Normocephalic and atraumatic.   Eyes: EOM are normal. Pupils are equal, round, and reactive to light. No scleral icterus.   Neck: Normal range of motion. Neck supple.   Cardiovascular: Normal rate and normal heart sounds.    Pulmonary/Chest: Effort normal and breath sounds normal.   Musculoskeletal: Normal range of motion. She exhibits no edema.   Neurological: She is alert and oriented to person, place, and time. No cranial nerve deficit.   Skin: Skin is warm and dry. No erythema.   Psychiatric: She has a normal mood and affect. Her behavior is normal. Judgment normal.   Vitals reviewed.      Assessment:       1. Obesity (BMI 30.0-34.9)        Plan:         Antonia was seen today for follow-up.    Diagnoses and all orders for this visit:    Obesity (BMI 30.0-34.9)    Other orders  -     topiramate (TROKENDI XR) 50 mg Cp24; Take 50 mg by mouth once daily.    Patient was informed that topiramate is used for migraine prevention and seizures. Weight loss is a common side effect that is well documented. She understands this. She was informed of the potential side effects such as serious and possibly fatal rash in which case the medication should be discontinued immediately. Paresthesias, forgetfulness, fatigue, kidney stones, GI symptoms, and changes in lab values such as electrolytes, blood counts and kidney function.     Low carb comfort food recipes given.     Intermittent fasting. NO eating from 8 pm to 12pm. Can have water, tea, coffee in that time without sweeteners.    2 meals made up of the following:  Unlimited green vegetables, tomatoes, mushrooms, spaghetti squash, cauliflower, meat, poultry, seafood, eggs and hard cheeses.   Avoid fried foods  Dressings, seasonings, condiments, etc should have less than 2 g sugars.   Beans or nuts can have 1 x a day.   1-2 servings of citrus fruits, berries, pineapple or melon a day (1/2 cup)  Milk and plain  yogurt    No soda, sweet tea, juices or lemonade    No grains, rice, pasta, potatoes or bread.     [email or web address].dietdoctor.com for info on intermittent fasting.

## 2017-08-30 ENCOUNTER — PATIENT MESSAGE (OUTPATIENT)
Dept: BARIATRICS | Facility: CLINIC | Age: 37
End: 2017-08-30

## 2017-08-31 ENCOUNTER — PATIENT MESSAGE (OUTPATIENT)
Dept: OBSTETRICS AND GYNECOLOGY | Facility: CLINIC | Age: 37
End: 2017-08-31

## 2017-10-02 ENCOUNTER — PATIENT MESSAGE (OUTPATIENT)
Dept: BARIATRICS | Facility: CLINIC | Age: 37
End: 2017-10-02

## 2017-10-18 ENCOUNTER — TELEPHONE (OUTPATIENT)
Dept: BARIATRICS | Facility: CLINIC | Age: 37
End: 2017-10-18

## 2017-10-19 ENCOUNTER — OFFICE VISIT (OUTPATIENT)
Dept: BARIATRICS | Facility: CLINIC | Age: 37
End: 2017-10-19
Payer: COMMERCIAL

## 2017-10-19 VITALS
WEIGHT: 208.56 LBS | BODY MASS INDEX: 31.61 KG/M2 | SYSTOLIC BLOOD PRESSURE: 126 MMHG | HEART RATE: 76 BPM | HEIGHT: 68 IN | DIASTOLIC BLOOD PRESSURE: 82 MMHG

## 2017-10-19 DIAGNOSIS — E66.9 OBESITY (BMI 30-39.9): Primary | ICD-10-CM

## 2017-10-19 PROCEDURE — 99999 PR PBB SHADOW E&M-EST. PATIENT-LVL III: CPT | Mod: PBBFAC,,, | Performed by: INTERNAL MEDICINE

## 2017-10-19 PROCEDURE — 99213 OFFICE O/P EST LOW 20 MIN: CPT | Mod: S$GLB,,, | Performed by: INTERNAL MEDICINE

## 2017-10-19 RX ORDER — TOPIRAMATE 100 MG/1
100 CAPSULE, EXTENDED RELEASE ORAL DAILY
Qty: 30 CAPSULE | Refills: 3 | Status: SHIPPED | OUTPATIENT
Start: 2017-10-19 | End: 2018-01-16 | Stop reason: SDUPTHER

## 2017-10-19 NOTE — PATIENT INSTRUCTIONS
Patient was informed that topiramate is used for migraine prevention and seizures. Weight loss is a common side effect that is well documented. She understands this. She was informed of the potential side effects such as serious and possibly fatal rash in which case the medication should be discontinued immediately. Paresthesias, forgetfulness, fatigue, kidney stones, GI symptoms, and changes in lab values such as electrolytes, blood counts and kidney function.      ntermittent fasting. NO eating from 8 pm to 12pm. Can have water, tea, coffee in that time without sweeteners.    2 meals made up of the following:  Unlimited green vegetables, tomatoes, mushrooms, spaghetti squash, cauliflower, meat, poultry, seafood, eggs and hard cheeses.   Avoid fried foods  Dressings, seasonings, condiments, etc should have less than 2 g sugars.   Beans or nuts can have 1 x a day.   1-2 servings of citrus fruits, berries, pineapple or melon a day (1/2 cup)  Milk and plain yogurt    No soda, sweet tea, juices or lemonade    No grains, rice, pasta, potatoes or bread.     [email or web address] for info on intermittent fasting.       Eating well to be healthy and lose weight does not have to be hard. It also does not have to be time consuming or expensive. There a lots of ways you can work in healthy choices into your day. Many of these are easy, quick and even family friendly!    Homemade hazelnut au lait  Brew your favorite brand of hazelnut flavored coffee ([employer] makes a good one). Microwave 1/2 cup of milk that fits your eating plan (whole, skim or sugar-free almond milk can all work). Add half to 1 oz sugar free hazelnut syrup.     Quick and easy breakfast  1-2 boiled eggs or mini-frittatas with a tangerine. The boiled eggs and mini-frittatas can both be made ahead and last for up to 4 days in the refrigerator. Bonus if you portion them out in ready to go containers or zipper bags.     Breakfast Egg Muffins with Morales and  Spinach  Makes 12 muffins  Ingredients    6 eggs  ¼ cup milk  ¼ teaspoon salt  2 cups grated cheddar cheese  3/4 cup spinach, cooked and drained (about 8 oz fresh spinach)  6 stearns slices, cooked, drained of fat, and chopped  1/2 cup grated Parmesan cheese (optional)    Instructions      Preheat oven to 350 degrees. Use a regular 12-cup muffin pan. Spray the muffin pan with non-stick cooking spray.  In a large bowl, beat eggs until smooth. Add milk, salt, Cheddar cheese and mix. Stir spinach, cooked stearns into the egg mixture. Ladle the egg mixture into greased muffin cups ¾ full.  Top each muffin cup with grated Parmesan cheese.  Bake for 25 minutes. Remove from the oven, let the muffins cool for 30 minutes before removing them from the pan.      Be a brown bagger! When you make dinner, plan for an extra helping. When you serve your plate for dinner, serve an additional helping into a container that you can take with you the next day. If you don't have a refrigerator available during the day, an insulated lunch bag and ice packs will help you safely store you lunch.     Cold Brewed Iced tea. Fill a pitcher with 64 oz filtered water. Add either 4 regular tea bags of your choice or a large iced tea bag. Refrigerate over night then remove the tea bags. The tea will not be bitter and is super flavorful. Get creative! Try combinations like green tea and hibiscus tea or black tea with lemon zinger. Add orange or lemon slices for even more flavor.     Snack wisely. Protein filled snacks will fill you up, allowing you to get by with fewer calories. String cheese, pork skins (chicharrones), turkey pepperoni, or celery with cream cheese will all fit the bill.       Ditch the take out. Turkey tacos (with or without a low carb tortilla), burgers (without the bun), or fun stir fries are all quick and easy. The whole family will be happy, and you can save calories and money.      Orange Chicken Stir deleon with asparagus   Makes 6  servings  Ingredients:    1.5 lbs boneless skinless chicken breast/tenders, diced into 1-inch pieces  1 Tbsp extra virgin olive or avocado oil, divided  2 lb asparagus, end portions trimmed and remainder diced into 1 1/2-inch pieces  1 small yellow onion, sliced into thin strips  8 oz button mushrooms, sliced  1 Tbsp peeled and finely grated fresh tomasz  4 cloves garlic, minced  1/2 cup low-sodium chicken broth  Juice of 2 fresh oranges  2 Tbsp low sodium soy sauce  2 Tbsp cornstarch  Sea salt and freshly ground black pepper    Directions:    In a 12-inch non-stick wok, heat 1/2 oil over moderately high heat. Once oil is hot, add diced chicken and season lightly with salt and pepper. Sauté until cooked through, tossing occasionally, about 5-6 minutes.  Place chicken on a large plate and set aside. Return wok, reduce to medium-high heat, add remaining oil.  Once oil is hot, add asparagus, yellow onion and mushrooms, and sauté until tender-crisp, about 4 - 5 minutes, adding in garlic and tomasz during the last 1 minute of sautéing.  Meanwhile, in a mixing bowl whisk together chicken broth, orange juice, soy sauce and cornstarch until well blended.  Pour chicken broth mixture into skillet with veggies, season with salt and pepper to taste, and bring mixture to a light boil, stirring constantly. Allow mixture to gently boil, stirring constantly, until thickened, about 1 minute.  Toss chicken into mixture and serve immediately over cauliflower rice or Shirataki noodles.      Skinny Chicken Tortilla Soup  Makes 7 servings    2 teaspoons olive oil  1 cup onion, chopped (about 1 small)  2 cups celery, sliced (about 4 medium stalks)  4 garlic cloves, minced  4 medium tomatoes, chopped  2 cups water  4 cups low-sodium organic chicken broth  3 cups chopped and/or shredded rotisserie chicken, skinless  2 cups sliced carrots (about 3 medium)  1 teaspoon dried oregano leaves  2 teaspoons chili powder  1 teaspoon garlic powder  2  teaspoons cumin  ½ teaspoon cayenne pepper (add less or omit, if you don't want a spicy soup)  ½ teaspoon sea salt + more to taste  ½ teaspoon pepper + more to taste    Directions:   Put all ingredients into a large crock pot. Cook on low for 5-6 hours.     Optional garnish with chopped avocado, chopped fresh cilantro, crumbled Cotija cheese, sour cream, Greek yogurt, your favorite hot sauce.           Vegan Avocado Banana Chocolate Pudding  Makes 4 servings  Ingredients    1 1/2 ripe avocados  2 ripe bananas  6 tbsp raw cacao powder or unsweetened cocoa powder  2-3 tbsp maple syrup (or calorie free sweetener)  1/4 cup almond milk  Instructions    Blend everything together in a  until the consistency is smooth and velvety. Taste and see if more sweetener is needed and stir to make sure everything is evenly mixed. Blend a second time if needed.  Top with banana slices, raw cacao nibs, almond butter, or any other toppings and enjoy!

## 2017-10-19 NOTE — PROGRESS NOTES
"Subjective:       Patient ID: Antonia Car is a 37 y.o. female.    Chief Complaint: No chief complaint on file.    Pt here today for follow-up. Has lost 14 lbs with IF, total 15 lbs. Concerned that she has only lost 1lbs in the past 3-4 weeks.       Prev 222. She has been on trokendi. She did not like SE and weaned it to off. Has emailed with RD for some tips on diet.       Review of Systems   Constitutional: Negative for chills and fever.   Respiratory: Negative for shortness of breath.         + snores   Cardiovascular: Positive for leg swelling. Negative for chest pain.        And hands   Gastrointestinal: Positive for constipation. Negative for diarrhea.        Occasional HB   Genitourinary: Negative for difficulty urinating and menstrual problem.        On OCPS   Musculoskeletal: Positive for arthralgias and back pain.        Knees and upper back   Neurological: Negative for dizziness and light-headedness.   Psychiatric/Behavioral: Negative for dysphoric mood. The patient is nervous/anxious.        Objective:     /82   Pulse 76   Ht 5' 8" (1.727 m)   Wt 94.6 kg (208 lb 8.9 oz)   BMI 31.71 kg/m²     Physical Exam   Constitutional: She is oriented to person, place, and time. She appears well-developed. No distress.   Obese   HENT:   Head: Normocephalic and atraumatic.   Eyes: EOM are normal. Pupils are equal, round, and reactive to light. No scleral icterus.   Neck: Normal range of motion. Neck supple.   Cardiovascular: Normal rate and normal heart sounds.    Pulmonary/Chest: Effort normal and breath sounds normal.   Musculoskeletal: Normal range of motion. She exhibits no edema.   Neurological: She is alert and oriented to person, place, and time. No cranial nerve deficit.   Skin: Skin is warm and dry. No erythema.   Psychiatric: She has a normal mood and affect. Her behavior is normal. Judgment normal.   Vitals reviewed.      Assessment:       1. Obesity (BMI 30-39.9)        Plan:     "     Antonia was seen today for follow-up.    Diagnoses and all orders for this visit:    Obesity (BMI 30-39.9)    Other orders  -     topiramate 100 mg Cp24; Take 1 capsule (100 mg total) by mouth once daily.             Patient was informed that topiramate is used for migraine prevention and seizures. Weight loss is a common side effect that is well documented. She understands this. She was informed of the potential side effects such as serious and possibly fatal rash in which case the medication should be discontinued immediately. Paresthesias, forgetfulness, fatigue, kidney stones, GI symptoms, and changes in lab values such as electrolytes, blood counts and kidney function.     Healthy all day tips given.     Intermittent fasting. NO eating from 8 pm to 12pm. Can have water, tea, coffee in that time without sweeteners.    2 meals made up of the following:  Unlimited green vegetables, tomatoes, mushrooms, spaghetti squash, cauliflower, meat, poultry, seafood, eggs and hard cheeses.   Avoid fried foods  Dressings, seasonings, condiments, etc should have less than 2 g sugars.   Beans or nuts can have 1 x a day.   1-2 servings of citrus fruits, berries, pineapple or melon a day (1/2 cup)  Milk and plain yogurt    No soda, sweet tea, juices or lemonade    No grains, rice, pasta, potatoes or bread.     [email or web address].dietdoctor.com for info on intermittent fasting.

## 2017-10-20 ENCOUNTER — PATIENT MESSAGE (OUTPATIENT)
Dept: BARIATRICS | Facility: CLINIC | Age: 37
End: 2017-10-20

## 2017-10-31 ENCOUNTER — PATIENT MESSAGE (OUTPATIENT)
Dept: BARIATRICS | Facility: CLINIC | Age: 37
End: 2017-10-31

## 2018-01-06 ENCOUNTER — PATIENT MESSAGE (OUTPATIENT)
Dept: BARIATRICS | Facility: CLINIC | Age: 38
End: 2018-01-06

## 2018-01-16 ENCOUNTER — OFFICE VISIT (OUTPATIENT)
Dept: BARIATRICS | Facility: CLINIC | Age: 38
End: 2018-01-16
Payer: COMMERCIAL

## 2018-01-16 VITALS
HEART RATE: 86 BPM | WEIGHT: 203.69 LBS | DIASTOLIC BLOOD PRESSURE: 70 MMHG | BODY MASS INDEX: 30.87 KG/M2 | SYSTOLIC BLOOD PRESSURE: 110 MMHG | HEIGHT: 68 IN

## 2018-01-16 DIAGNOSIS — E66.9 OBESITY (BMI 30.0-34.9): Primary | ICD-10-CM

## 2018-01-16 PROCEDURE — 99213 OFFICE O/P EST LOW 20 MIN: CPT | Mod: S$GLB,,, | Performed by: INTERNAL MEDICINE

## 2018-01-16 PROCEDURE — 99999 PR PBB SHADOW E&M-EST. PATIENT-LVL III: CPT | Mod: PBBFAC,,, | Performed by: INTERNAL MEDICINE

## 2018-01-16 RX ORDER — TOPIRAMATE 100 MG/1
100 CAPSULE, EXTENDED RELEASE ORAL DAILY
Qty: 30 CAPSULE | Refills: 5 | Status: SHIPPED | OUTPATIENT
Start: 2018-01-16 | End: 2018-08-02 | Stop reason: SDUPTHER

## 2018-01-16 NOTE — PATIENT INSTRUCTIONS
Patient was informed that topiramate is used for migraine prevention and seizures. Weight loss is a common side effect that is well documented. She understands this. She was informed of the potential side effects such as serious and possibly fatal rash in which case the medication should be discontinued immediately. Paresthesias, forgetfulness, fatigue, kidney stones, GI symptoms, and changes in lab values such as electrolytes, blood counts and kidney function.       Intermittent fasting. NO eating from 8 pm to 12pm. Can have water, tea, coffee in that time without sweeteners.    2 meals made up of the following:  Unlimited green vegetables, tomatoes, mushrooms, spaghetti squash, cauliflower, meat, poultry, seafood, eggs and hard cheeses.   Avoid fried foods  Dressings, seasonings, condiments, etc should have less than 2 g sugars.   Beans or nuts can have 1 x a day.   1-2 servings of citrus fruits, berries, pineapple or melon a day (1/2 cup)  Milk and plain yogurt    No soda, sweet tea, juices or lemonade    No grains, rice, pasta, potatoes or bread.     [email or web address].dietdoctor.[employer] for info on intermittent fasting.       Dinner in Under 30 minutes and 400 calories.     Baked Chicken breast with Broccoli Cheese Casserole  2-3 chicken breast (approximately 6-8 oz each)    2 tsp each garlic and herb Mrs. Dash seasoning   2 tsp your favorite creole seasoning  2 tablespoons of balsamic vinegar  2 - 10 oz packages of frozen broccoli  1 egg   ½ cup skim milk  ½ tsp paprika   ½ tsp cayenne pepper   1 can fat free cream of mushroom soup.   1 .5 cups of shredded cheddar cheese    Pre-heat oven to 450 degrees. Toss 2-3 chicken breast (approximately 6-8 oz each) in 2 tsp each garlic and herb Mrs. Dash seasoning, 2 tsp your favorite creole seasoning, and 2 tablespoons of balsamic vinegar. This can also be done up to 24 hours ahead of time, and the chicken can be left in the refrigerator to marinate. Place on a baking sheet sprayed with  non-stick cooking spray and bake on 450 degrees for 10 min, then reduce heat to 350 degrees and cook an addition 10-15 minutes until chicken is cooked through.   While chicken is baking, microwave safe dish, thaw 2 - 10 oz packages of frozen broccoli. Drain any excess water, season with salt, pepper, all-purpose seasoning of your choice. In another bowl, whisk together 1 egg, ½ cup skim milk, ½ tsp paprika, ½ tsp cayenne pepper and 1 can fat free cream of mushroom soup. Combine broccoli, soup mixture, 1 cup of shredded cheddar cheese in baking dish sprayed with cooking spray. Top with remaining cheese. Bake in the oven with chicken for about 20 min or until set cheese is melted and lui.     Makes 4 servings. 389 calories per serving.10 g fat, 13 g carbs, 54g protein     Sheet Pan [place] Shrimp  1 pound large shrimp, shelled, peeled and deveined.   2 tablespoon olive oil  The zest and the juice of 1 lime  ½ tsp chili powder  ½-1 tsp cayenne pepper  1 tsp cumin  ½ tsp dry oregano  1 red bell pepper  1 green bell pepper  1 red onion  2 cups mushrooms, quartered  1 avocado  Whole yessenia lettuce leaves  Preheat oven to 375 degrees.  In a bowl combine shrimp, lime juice, lime zest, cumin, cayenne pepper, chili powder, and a pinch of salt. Set aside.   Slice peppers and onions into thin strips. Toss in 1 tablespoon of olive oil. Sprinkle with salt and pepper and arrange in a single layer over 1/3 of a large sheet pan  Toss mushrooms with remaining olive oil, oregano, salt and pepper. Arrange in single layer on sheet pan. Place sheet pan in oven for about 15-20 minutes until vegetables are softened, cooked about ¾ of the way through. Remove the sheet pan from oven.  Change setting on oven to low broil.  If needed, rearrange vegetables to make room for shrimp. Arrange the shrimp in a single layer on the sheet pan and return pan to oven. After 5 minutes, flip shrimp. Cook 3-5 additional minutes, or until  Shrimp are  opaque.   Serve shrimp and cooked vegetables on lettuce leaves with sliced avocado.   Makes 4 servings. Calories per servin; 23g fat, 19 g carbohydrates, 27g protein.    Zoodles Primavera with Seasoned Ricotta  8 cups zucchini noodles. These can be purchased already prepared, or you can make them on your own with whole zucchini and a vegetable spiralizer.   1.5 cups cherry tomatoes, quartered  2 cups sliced mushrooms  1 cup chopped fresh broccoli  1 cup frozen peas and carrots  2 cloves garlic, finely chopped  16 oz part skim ricotta cheese  2 oz Neufchatel cream cream cheese, cut into small cubes  Juice and zest of 1 lemon  1 pinch red pepper flakes    2 tsp Italian seasoning  4-5 leaves fresh basil, thinly sliced  1 tablespoon of olive oil  Parmesan cheese for topping (optional)     In a bowl, combine ricotta cheese, 1 tsp Italian seasoning, ½ teaspoon lemon zest. Season with salt and pepper to taste. Mix well. Fold in half of fresh basil. Set aside.   Heat a large skillet to medium high. Add olive oil. Sautee mushrooms until starting to become tender. Season them with salt and pepper while cooking.  Add broccoli and peas and carrots. Reduce heat to medium. Cover pan and cook for 4-5 minutes until broccoli is tender and peas and carrots are warmed through. Add Neufchatel cheese, Italian seasoning, red pepper flakes, 1 tsp lemon zest and lemon juice. Stir until a smooth sauce is formed. Add zucchini noodles (zoodles) to skillet and toss in sauce, then add cherry tomatoes.  Separate zoodle and vegetables into 4 equal portions. Serve each with a ¼ cup serving of seasoned ricotta cheese. Sprinkle with grated parmesan cheese or fresh basil,  if desired.   Makes 4 servings. Calories per servin calories, 32 g carbs, 33 g protein, 18 g fat

## 2018-01-16 NOTE — PROGRESS NOTES
"Subjective:       Patient ID: Antonia Jeffers is a 37 y.o. female.    Chief Complaint: Follow-up    Pt here today for follow-up. Has lost 5 lbs with IF, total 20 lbs. She does state that she had a hard time over the holidays. Shehas been getting back on track. Tolerating the trokendi well.       Review of Systems   Constitutional: Negative for chills and fever.   Respiratory: Negative for shortness of breath.         + snores   Cardiovascular: Positive for leg swelling. Negative for chest pain.        And hands   Gastrointestinal: Positive for constipation. Negative for diarrhea.        Occasional HB   Genitourinary: Negative for difficulty urinating and menstrual problem.        On OCPS   Musculoskeletal: Positive for arthralgias and back pain.        Knees and upper back   Neurological: Negative for dizziness and light-headedness.   Psychiatric/Behavioral: Negative for dysphoric mood. The patient is nervous/anxious.        Objective:     /70   Pulse 86   Ht 5' 8" (1.727 m)   Wt 92.4 kg (203 lb 11.3 oz)   LMP 01/02/2018   BMI 30.97 kg/m²     Physical Exam   Constitutional: She is oriented to person, place, and time. She appears well-developed. No distress.   Obese   HENT:   Head: Normocephalic and atraumatic.   Eyes: EOM are normal. Pupils are equal, round, and reactive to light. No scleral icterus.   Neck: Normal range of motion. Neck supple.   Cardiovascular: Normal rate and normal heart sounds.    Pulmonary/Chest: Effort normal and breath sounds normal.   Musculoskeletal: Normal range of motion. She exhibits no edema.   Neurological: She is alert and oriented to person, place, and time. No cranial nerve deficit.   Skin: Skin is warm and dry. No erythema.   Psychiatric: She has a normal mood and affect. Her behavior is normal. Judgment normal.   Vitals reviewed.      Assessment:       1. Obesity (BMI 30.0-34.9)        Plan:         Antonia was seen today for follow-up.    Diagnoses and all orders for " this visit:    Obesity (BMI 30.0-34.9)    Other orders  -     topiramate 100 mg Cp24; Take 1 capsule (100 mg total) by mouth once daily.             Patient was informed that topiramate is used for migraine prevention and seizures. Weight loss is a common side effect that is well documented. She understands this. She was informed of the potential side effects such as serious and possibly fatal rash in which case the medication should be discontinued immediately. Paresthesias, forgetfulness, fatigue, kidney stones, GI symptoms, and changes in lab values such as electrolytes, blood counts and kidney function.     30 min recipes given.     Intermittent fasting. NO eating from 8 pm to 12pm. Can have water, tea, coffee in that time without sweeteners.    2 meals made up of the following:  Unlimited green vegetables, tomatoes, mushrooms, spaghetti squash, cauliflower, meat, poultry, seafood, eggs and hard cheeses.   Avoid fried foods  Dressings, seasonings, condiments, etc should have less than 2 g sugars.   Beans or nuts can have 1 x a day.   1-2 servings of citrus fruits, berries, pineapple or melon a day (1/2 cup)  Milk and plain yogurt    No soda, sweet tea, juices or lemonade    No grains, rice, pasta, potatoes or bread.     [email or web address].[email or web address].[employer] for info on intermittent fasting.

## 2018-04-03 ENCOUNTER — OFFICE VISIT (OUTPATIENT)
Dept: INTERNAL MEDICINE | Facility: CLINIC | Age: 38
End: 2018-04-03
Payer: COMMERCIAL

## 2018-04-03 ENCOUNTER — LAB VISIT (OUTPATIENT)
Dept: LAB | Facility: HOSPITAL | Age: 38
End: 2018-04-03
Attending: NURSE PRACTITIONER
Payer: COMMERCIAL

## 2018-04-03 VITALS
BODY MASS INDEX: 31.64 KG/M2 | HEIGHT: 68 IN | TEMPERATURE: 98 F | DIASTOLIC BLOOD PRESSURE: 76 MMHG | SYSTOLIC BLOOD PRESSURE: 118 MMHG | WEIGHT: 208.75 LBS | HEART RATE: 80 BPM | OXYGEN SATURATION: 99 %

## 2018-04-03 DIAGNOSIS — N39.3 STRESS INCONTINENCE OF URINE: ICD-10-CM

## 2018-04-03 DIAGNOSIS — N39.3 STRESS INCONTINENCE OF URINE: Primary | ICD-10-CM

## 2018-04-03 LAB
ANION GAP SERPL CALC-SCNC: 8 MMOL/L
BASOPHILS # BLD AUTO: 0.02 K/UL
BASOPHILS NFR BLD: 0.3 %
BILIRUB SERPL-MCNC: ABNORMAL MG/DL
BLOOD URINE, POC: ABNORMAL
BUN SERPL-MCNC: 15 MG/DL
CALCIUM SERPL-MCNC: 9.6 MG/DL
CHLORIDE SERPL-SCNC: 108 MMOL/L
CO2 SERPL-SCNC: 23 MMOL/L
COLOR, POC UA: YELLOW
CREAT SERPL-MCNC: 0.8 MG/DL
DIFFERENTIAL METHOD: NORMAL
EOSINOPHIL # BLD AUTO: 0 K/UL
EOSINOPHIL NFR BLD: 0.3 %
ERYTHROCYTE [DISTWIDTH] IN BLOOD BY AUTOMATED COUNT: 13.4 %
EST. GFR  (AFRICAN AMERICAN): >60 ML/MIN/1.73 M^2
EST. GFR  (NON AFRICAN AMERICAN): >60 ML/MIN/1.73 M^2
GLUCOSE SERPL-MCNC: 86 MG/DL
GLUCOSE UR QL STRIP: NORMAL
HCT VFR BLD AUTO: 39.1 %
HGB BLD-MCNC: 13.1 G/DL
KETONES UR QL STRIP: ABNORMAL
LEUKOCYTE ESTERASE URINE, POC: ABNORMAL
LYMPHOCYTES # BLD AUTO: 2.5 K/UL
LYMPHOCYTES NFR BLD: 38 %
MCH RBC QN AUTO: 30 PG
MCHC RBC AUTO-ENTMCNC: 33.5 G/DL
MCV RBC AUTO: 90 FL
MONOCYTES # BLD AUTO: 0.4 K/UL
MONOCYTES NFR BLD: 6.5 %
NEUTROPHILS # BLD AUTO: 3.7 K/UL
NEUTROPHILS NFR BLD: 54.7 %
NITRITE, POC UA: ABNORMAL
PH, POC UA: 7
PLATELET # BLD AUTO: 273 K/UL
PMV BLD AUTO: 10.3 FL
POTASSIUM SERPL-SCNC: 4.2 MMOL/L
PROTEIN, POC: ABNORMAL
RBC # BLD AUTO: 4.37 M/UL
SODIUM SERPL-SCNC: 139 MMOL/L
SPECIFIC GRAVITY, POC UA: 1.01
UROBILINOGEN, POC UA: NORMAL
WBC # BLD AUTO: 6.66 K/UL

## 2018-04-03 PROCEDURE — 99999 PR PBB SHADOW E&M-EST. PATIENT-LVL IV: CPT | Mod: PBBFAC,,, | Performed by: NURSE PRACTITIONER

## 2018-04-03 PROCEDURE — 99213 OFFICE O/P EST LOW 20 MIN: CPT | Mod: 25,S$GLB,, | Performed by: NURSE PRACTITIONER

## 2018-04-03 PROCEDURE — 85025 COMPLETE CBC W/AUTO DIFF WBC: CPT

## 2018-04-03 PROCEDURE — 81002 URINALYSIS NONAUTO W/O SCOPE: CPT | Mod: S$GLB,,, | Performed by: NURSE PRACTITIONER

## 2018-04-03 PROCEDURE — 36415 COLL VENOUS BLD VENIPUNCTURE: CPT

## 2018-04-03 PROCEDURE — 80048 BASIC METABOLIC PNL TOTAL CA: CPT

## 2018-04-03 NOTE — PATIENT INSTRUCTIONS
Why Kegel exercises matter  Many factors can weaken your pelvic floor muscles, including pregnancy, childbirth, surgery, aging, excessive straining from constipation or chronic coughing, and being overweight.  You might benefit from doing Kegel exercises if you:  Leak a few drops of urine while sneezing, laughing or coughing (stress incontinence)   Have a strong, sudden urge to urinate just before losing a large amount of urine (urinary incontinence)   Leak stool (fecal incontinence)  Kegel exercises can be done during pregnancy or after childbirth to try to prevent urinary incontinence.  Keep in mind that Kegel exercises are less helpful for women who have severe urine leakage when they sneeze, cough or laugh. Also, Kegel exercises aren't helpful for women who unexpectedly leak small amounts of urine due to a full bladder (overflow incontinence).  How to do Kegel exercises  To get started:  Find the right muscles. To identify your pelvic floor muscles, stop urination in midstream. If you succeed, you've got the right muscles. Once you've identified your pelvic floor muscles you can do the exercises in any position, although you might find it easiest to do them lying down at first.   Perfect your technique. Tighten your pelvic floor muscles, hold the contraction for five seconds, and then relax for five seconds. Try it four or five times in a row. Work up to keeping the muscles contracted for 10 seconds at a time, relaxing for 10 seconds between contractions.   Maintain your focus. For best results, focus on tightening only your pelvic floor muscles. Be careful not to flex the muscles in your abdomen, thighs or buttocks. Avoid holding your breath. Instead, breathe freely during the exercises.   Repeat three times a day. Aim for at least three sets of 10 repetitions a day.  Don't make a habit of using Kegel exercises to start and stop your urine stream. Doing Kegel exercises while emptying your bladder can actually  lead to incomplete emptying of the bladder -- which increases the risk of a urinary tract infection.

## 2018-04-03 NOTE — PROGRESS NOTES
"Subjective:       Patient ID: Antonia Jeffers is a 38 y.o. female.    Chief Complaint: Urinary Incontinence    HPI:  39 yo female that presents to clinic with complaint of urinary incontinence for the past 2-3 weeks.    States that she has had a total of 4 episodes of incontinence after sneezing during this time span.  Denies any dysuria or gross hematuria.  Denies any constipation as she states her bowel movements are regular.  Reports that her appetite and energy level are good.  States that the episodes of incontinence are so bad that she has "started carrying an extra pair of panties in her purse."    States that she has noticed a small "twinge" in her left lower back but states that she doesn't know if it's related to the incontinence.    States that she feels like she completely empties her bladder when urinating.    Reports a history of UTIs and kidney stones.    Review of Systems   Constitutional: Negative for activity change, appetite change, fatigue and fever.   Respiratory: Negative for apnea, cough, shortness of breath and wheezing.    Cardiovascular: Negative for chest pain, palpitations and leg swelling.   Gastrointestinal: Negative for abdominal pain, constipation, diarrhea, nausea and vomiting.   Genitourinary: Negative for dysuria, flank pain, frequency, hematuria, pelvic pain and urgency.        Admits incontinence   Musculoskeletal: Negative for arthralgias, myalgias, neck pain and neck stiffness.   Neurological: Negative for dizziness, light-headedness, numbness and headaches.   Psychiatric/Behavioral: Negative for behavioral problems.       Objective:      Physical Exam   Constitutional: She is oriented to person, place, and time. She appears well-developed and well-nourished. No distress.   Neck: Normal range of motion. Neck supple. No thyromegaly present.   Cardiovascular: Normal rate, regular rhythm, normal heart sounds and intact distal pulses.    No murmur heard.  Pulmonary/Chest: Effort " normal and breath sounds normal. No respiratory distress. She has no wheezes. She has no rales.   Abdominal: Soft. Bowel sounds are normal. She exhibits no distension and no mass. There is no tenderness.   Musculoskeletal:   No CVA tenderness   Lymphadenopathy:     She has no cervical adenopathy.   Neurological: She is alert and oriented to person, place, and time. No sensory deficit.   Skin: Skin is warm and dry.   Psychiatric: Her behavior is normal.       Assessment:       1. Stress incontinence of urine        Plan:       1. Stress incontinence of urine    -UA dipstick is unremarkable in clinic today.  -Will check a cbc and bmp today to evaluate kidney function and look for any underlying infection.  -Will refer patient to urology for further evaluation and management for incontinence.  -Information on kegel exercises included in discharge summary.

## 2018-04-26 ENCOUNTER — OFFICE VISIT (OUTPATIENT)
Dept: BARIATRICS | Facility: CLINIC | Age: 38
End: 2018-04-26
Payer: COMMERCIAL

## 2018-04-26 VITALS
DIASTOLIC BLOOD PRESSURE: 70 MMHG | BODY MASS INDEX: 31.11 KG/M2 | HEIGHT: 68 IN | HEART RATE: 70 BPM | WEIGHT: 205.25 LBS | SYSTOLIC BLOOD PRESSURE: 110 MMHG

## 2018-04-26 DIAGNOSIS — M79.7 FIBROMYALGIA: ICD-10-CM

## 2018-04-26 DIAGNOSIS — E66.9 OBESITY (BMI 30.0-34.9): Primary | ICD-10-CM

## 2018-04-26 PROCEDURE — 99999 PR PBB SHADOW E&M-EST. PATIENT-LVL III: CPT | Mod: PBBFAC,,, | Performed by: INTERNAL MEDICINE

## 2018-04-26 PROCEDURE — 99213 OFFICE O/P EST LOW 20 MIN: CPT | Mod: S$GLB,,, | Performed by: INTERNAL MEDICINE

## 2018-04-26 RX ORDER — PHENTERMINE HYDROCHLORIDE 37.5 MG/1
TABLET ORAL
Qty: 30 TABLET | Refills: 1 | Status: SHIPPED | OUTPATIENT
Start: 2018-04-26 | End: 2018-08-02 | Stop reason: SDUPTHER

## 2018-04-26 NOTE — PATIENT INSTRUCTIONS
Patient was informed that topiramate is used for migraine prevention and seizures. Weight loss is a common side effect that is well documented. She understands this. She was informed of the potential side effects such as serious and possibly fatal rash in which case the medication should be discontinued immediately. Paresthesias, forgetfulness, fatigue, kidney stones, GI symptoms, and changes in lab values such as electrolytes, blood counts and kidney function.     Patient warned of common side effects of phentermine including anxiety, insomnia, palpitations and increased blood pressure. It was also explained that it is for short-term usage along with diet and exercise, and that stopping the medication without making lifestyle changes will result in regain of weight. Patient states understanding.     Weight loss medications are controlled substances.  They require routine follow up. Prescription or pills that are lost or destroyed will not be replaced.       Start phentermine with 1/2 pill a day     Intermittent fasting. NO eating from 8 pm to 12pm. Can have water, tea, coffee in that time without sweeteners.    2 meals made up of the following:  Unlimited green vegetables, tomatoes, mushrooms, spaghetti squash, cauliflower, meat, poultry, seafood, eggs and hard cheeses.   Avoid fried foods  Dressings, seasonings, condiments, etc should have less than 2 g sugars.   Beans or nuts can have 1 x a day.   1-2 servings of citrus fruits, berries, pineapple or melon a day (1/2 cup)  Milk and plain yogurt    No soda, sweet tea, juices or lemonade    No grains, rice, pasta, potatoes or bread.     [email or web address] for info on intermittent fasting.       Spring Recipes:    [place] Shake:     Ingredients  ½ cup low fat cottage cheese  ¼ cup vanilla protein powder  1/8 tsp mint extract  2-3 packets of stevia or artificial sweetener of choice  5-10 ice cubes (more or less depending on how thick you would like it)  4 oz of  "water  2-3 drops of green food coloring OR a small handful of spinach to make it green    Put all ingredients in  and  until desired consistency.      "Unstuffed" Cabbage Rolls:    Ingredients:  1 1/2 to 2 pounds lean ground beef or turkey  1 large onion, chopped  1 clove garlic, minced  1 small cabbage, chopped  2 cans (14.5 ounces each) diced tomatoes  1 can (8 ounces) tomato sauce  ¼ - ½ cup water depending on desired consistency  1 teaspoon ground black pepper, salt to taste    Spray large skillet with nonstick cookspray. Heat pan on medium heat. Sauté the onions until tender, and then add the ground beef (or turkey) and cook until the meat is browned.    Add the garlic, cook an additional minute before adding the remaining ingredients. Cover, reduce the heat and simmer about 25 minutes (or until the cabbage is  fork tender)        Not so Td's Pie:    Ingredients  2 (or more) pounds of lean ground beef, or turkey  2 heads of cauliflower or 3 bags of frozen cauliflower, chopped  1 bag of frozen mixed veggies          (NO Corn, Peas or Potatoes!)  1-2 onions  1 egg  1 teaspoon each of basil, garlic powder, pepper, oregano and a little cayenne  4-5 sprays of I cant believe its not butter spray  4 ounces of fat free cream cheese (optional)  Low fat Cheese to top (optional)    Roast cauliflower in oven on 350* F for about 15-20 minutes, until browned and fork tender. (begin chao meat while cauliflower is cooking). Place cooked cauliflower in . Add 4 ounces of fat free cream cheese, 4-5 sprays of I cant believe its not butter SPRAY, and spices and puree until creamy.     While cauliflower is roasting, brown meat in large skillet and season to taste. Set aside. Sauté diced onion in skillet until somewhat soft. Set aside with meat. Pour mixed veggies in the skillet to heat on low heat.     Mix the meat, onions, mixed veggies, raw egg and any additional seasonings and put in bottom of " 9x13 baking dish. Spread mashed cauliflower mixture over it until smooth.    Bake at 350 for approximately 30 minutes. Add cheese and bake 5 additional minutes (optional). Serve warm (or reheat later).    Crock Pot Balsamic Pork Tenderloin:    Ingredients:  1 tsp dried thyme  1 tsp ground pepper  ½ tsp salt  3 tbsp dried minced onion  2 tsp dried basil  ¼ cup low sodium broth  ½ cup balsamic vinegar  2 cloves garlic, minced  2 lbs Pork Tenderloin    Mix first 5 ingredients together. Rub pork tenderloin with dry seasoning mixture.  In a large sauté pan, heat ¼ cup balsamic vinegar and garlic on medium heat. Add pork to sauté [place] and sear, chao all sides. Add low sodium broth, 1 tbsp at a time to keep tenderloin from sticking or use nonstick cookspray.     Transfer meat to crock pot. Pour remaining balsamic vinegar into sauté pan and continue  to stir for a few minutes to deglaze the pan. Pour juices over the top of the tenderloin in crockpot. Cook on high for 3 hours or until meat thermometer says 170*. Let rest 5-10 minutes and then slice.       Side Dish: Steamed carrots w/ garlic and tomasz    Ingredients:  2 garlic cloves, minced  1 lb baby carrots  5-6 sprays of I cant believe its not butter SPRAY  1 tsp minced peeled fresh tomasz  1 tbsp chopped fresh cilantro  ½ tsp grated lime rind  1 tbsp fresh lime juice   ¼ tsp salt    Prepare garlic; let stand 10 minutes. Steam carrots, covered in pot, about 10 minutes or until tender.     In a nonstick skillet over medium heat, spray pan w/ I cant believe its not butter SPRAY. Add garlic and tomasz and cook 1 minute, stirring constantly. Remove from  heat; stir in carrots, cilantro and remaining ingredients.         Side Dish: Green Bean Almondine    Ingredients:  1 pound fresh green beans, trimmed  1 tbsp dimitrios vinegar  1 ½ tsp water  1 tsp Farhad Mustard  ¼ tsp salt  ¼ tsp freshly ground black pepper  1 tbsp sliced almonds, toasted    Cook green beans in  boiling water for 4 minutes or until crisp-tender. Drain and plunge beans into ice water. Drain well. Pat beans dry w/ paper towel.     Combine vinegar, water, mustard, salt and pepper in a medium bowl. Add beans to vinegar mixture; toss to coat well. Sprinkle with toasted almonds.      How to Cook the Perfect Hard Boiled Egg:    Steam in water: Fill a large pot with 1 inch of water. Place steamer insert inside, cover, and bring to a boil over high heat. Add eggs to steamer basket, cover, and continue cooking 12 minute. If serving cold, immediately place eggs in a bowl of ice water and allow to cool for at least 15 minutes before peeling under cool running water. Store in the refrigerator for up to 5 days.    OR    Purchase Dash Go Rapid Egg Boiler: available @ [employer], [employer], [employer], [employer] for ~$15-$20      Classic Egg Salad Recipe:    Ingredients:  8 hard cooked eggs, peeled and coarsely chopped  4-6 tbsp of low fat or fat free thomason  2 tbsp celery, chopped  2 tsp josiah mustard  Dash of cayenne pepper (for spice)  Black pepper, salt to taste    In a medium bowl, combine thomason, celery, mustard and cayenne pepper with chopped eggs. Season w/ pepper and salt. Serve over Lettuce leaves.     Get Creative! Add chopped turkey stearns and tomato and serve on lettuce leaves for an egg-cellent BLT egg salad or mix lean diced ham, low fat cheddar and tomatoes for  egg salad    Tuna Deviled Eggs:    Ingredients:  12 hard boiled eggs  1 can of tuna packed in water  1 rib celery, diced  ¼ cup low fat thomason  1 tsp mustard  Garlic powder, black pepper to taste  Dash of paprika (for finish)    Cut eggs in half lengthwise. Remove yolk and mash in bowl. In separate bowl, mix tuna, celery, low fat thomason, mustard and seasonings.  Stir in egg yolks until blended. Stuff eggs and sprinkle dash of paprika      Stearns Jalapeno Deviled Eggs:    Ingredients:  12 hard boiled eggs, peeled  ½ cup low fat thomason  1 ½ tsp rice  vinegar  ¾ tsp ground mustard  ½ packet splenda  2 jalapenos, seeded and chopped, 6 pieces turkey stearns, cooked crisp and crumbled  Dash of paprika (for finish)    Cut eggs in half lengthwise. Remove yolk and mash in bowl. Add thomason, vinegar, spices and Splenda until well combined. Mix in jalapenos and stearns. Stuff eggs and sprinkle w/ dash of paprika      Sugar-Free High Protein Brownie Recipe:    Ingredients:  2 cups of pureed zucchini  4 oz fat free cream cheese  1 large egg  2 scoops chocolate protein powder  1 tbsp pure vanilla extract  1/3 cup unsweetened cocoa powder    ¼ tsp salt  2 tbsp chopped nuts  *8-9 packets of splenda or artificial sweetener of choice    Preheat oven to 350* F.     Line an 8x8 pan w/ parchment paper or spray with nonstick cookspray.     In a medium bowl, blend the fat free cream cheese with egg until creamy. Add chocolate protein powder and cocoa powder until creamy. Add vanilla extract. Stir in pureed zucchini and salt.     The batter will be thick, but not dry. If batter seems dry, add 1-2 tbsp water. Fold in Nuts. Pour batter in prepared pan.     Bake for 30 minutes. Allow to cool completely. Cut into squares and chill to semi-set.     *best if eaten within 2 days but may last 3-4 days in fridge.         Lemon Whip:    Ingredients:  Small box of sugar-free vanilla instant pudding mix  1 small packet of crystal light lemonade mix  2 cups skim milk or fat free fairlife milk  Sugar-free, fat free cool whip     Make sugar-free pudding using 2 cups skim milk according to package. Stir in 1 small packet of crystal light lemonade mix.  Fold in a dollop of sugar-free, fat free cool whip and stir to combine.     Home-made Sugar-free Pudding Pops:    Ingredients:  1 small pkg of sugar-free instant pudding mix (flavor of choice!)  2 cups fat free milk     Beat ingredients with whisk for 2 minutes. Pour into 6 paper or plastic cups or into a popsicle mold. Insert wooden pop stick in center of  each cup.     Freeze for 5 hours or until firm. Peel off paper or pull out of popsicle mold.     Variations: add sugar-free syrups to change up the flavor, such as sugar-free chocolate pudding + sugar free raspberry syrup = chocolate raspberry fudgesicle.

## 2018-04-26 NOTE — PROGRESS NOTES
"Subjective:       Patient ID: Antonia Jeffers is a 38 y.o. female.    Chief Complaint: Follow-up    Pt here today for follow-up. Has gained 2 lbs with IF, total 18 lbs. She does state that she had a hard time over the holidays. She has been getting back on track. Tolerating the trokendi well. She is at the max we can do with her OCPs.  Her fibromyalgia has been more active lately, and she has had some swelling.  She is still walking. She is not sure if there is a particular trigger. She is doing well with eating.       Review of Systems   Constitutional: Negative for chills and fever.   Respiratory: Negative for shortness of breath.         + snores   Cardiovascular: Positive for leg swelling. Negative for chest pain.        And hands   Gastrointestinal: Positive for constipation. Negative for diarrhea.        Occasional HB   Genitourinary: Negative for difficulty urinating and menstrual problem.        On OCPS   Musculoskeletal: Positive for arthralgias and back pain.        Knees and upper back   Neurological: Negative for dizziness and light-headedness.   Psychiatric/Behavioral: Negative for dysphoric mood. The patient is nervous/anxious.        Objective:     /70   Pulse 70   Ht 5' 8" (1.727 m)   Wt 93.1 kg (205 lb 4 oz)   LMP 04/05/2018   BMI 31.21 kg/m²     Physical Exam   Constitutional: She is oriented to person, place, and time. She appears well-developed. No distress.   Obese   HENT:   Head: Normocephalic and atraumatic.   Eyes: EOM are normal. Pupils are equal, round, and reactive to light. No scleral icterus.   Neck: Normal range of motion. Neck supple.   Cardiovascular: Normal rate and normal heart sounds.    Pulmonary/Chest: Effort normal and breath sounds normal.   Musculoskeletal: Normal range of motion. She exhibits no edema.   Neurological: She is alert and oriented to person, place, and time. No cranial nerve deficit.   Skin: Skin is warm and dry. No erythema.   Psychiatric: She has a " normal mood and affect. Her behavior is normal. Judgment normal.   Vitals reviewed.      Assessment:       1. Obesity (BMI 30.0-34.9)    2. Fibromyalgia        Plan:         Antonia was seen today for follow-up.    Diagnoses and all orders for this visit:    Obesity (BMI 30.0-34.9)  -     phentermine (ADIPEX-P) 37.5 mg tablet; 1/2 tab po q am.  Will ad back 1/2 tab phentermine for now.   Fibromyalgia-   Recent flair may explain slow down in weight loss.              Patient was informed that topiramate is used for migraine prevention and seizures. Weight loss is a common side effect that is well documented. She understands this. She was informed of the potential side effects such as serious and possibly fatal rash in which case the medication should be discontinued immediately. Paresthesias, forgetfulness, fatigue, kidney stones, GI symptoms, and changes in lab values such as electrolytes, blood counts and kidney function.     Patient warned of common side effects of phentermine including anxiety, insomnia, palpitations and increased blood pressure. It was also explained that it is for short-term usage along with diet and exercise, and that stopping the medication without making lifestyle changes will result in regain of weight. Patient states understanding.     Weight loss medications are controlled substances.  They require routine follow up. Prescription or pills that are lost or destroyed will not be replaced.       Start phentermine with 1/2 pill a day     Intermittent fasting. NO eating from 8 pm to 12pm. Can have water, tea, coffee in that time without sweeteners.    2 meals made up of the following:  Unlimited green vegetables, tomatoes, mushrooms, spaghetti squash, cauliflower, meat, poultry, seafood, eggs and hard cheeses.   Avoid fried foods  Dressings, seasonings, condiments, etc should have less than 2 g sugars.   Beans or nuts can have 1 x a day.   1-2 servings of citrus fruits, berries, pineapple or  melon a day (1/2 cup)  Milk and plain yogurt    No soda, sweet tea, juices or lemonade    No grains, rice, pasta, potatoes or bread.     [email or web address].dietdoctor.com for info on intermittent fasting.       Spring recipes.

## 2018-05-01 ENCOUNTER — OFFICE VISIT (OUTPATIENT)
Dept: OBSTETRICS AND GYNECOLOGY | Facility: CLINIC | Age: 38
End: 2018-05-01
Payer: COMMERCIAL

## 2018-05-01 VITALS
SYSTOLIC BLOOD PRESSURE: 128 MMHG | BODY MASS INDEX: 31.5 KG/M2 | WEIGHT: 207.88 LBS | HEIGHT: 68 IN | DIASTOLIC BLOOD PRESSURE: 80 MMHG

## 2018-05-01 DIAGNOSIS — Z12.39 BREAST CANCER SCREENING: ICD-10-CM

## 2018-05-01 DIAGNOSIS — Z30.41 ENCOUNTER FOR SURVEILLANCE OF CONTRACEPTIVE PILLS: ICD-10-CM

## 2018-05-01 DIAGNOSIS — Z01.419 VISIT FOR GYNECOLOGIC EXAMINATION: Primary | ICD-10-CM

## 2018-05-01 PROCEDURE — 99396 PREV VISIT EST AGE 40-64: CPT | Mod: S$GLB,,, | Performed by: OBSTETRICS & GYNECOLOGY

## 2018-05-01 PROCEDURE — 99999 PR PBB SHADOW E&M-EST. PATIENT-LVL III: CPT | Mod: PBBFAC,,, | Performed by: OBSTETRICS & GYNECOLOGY

## 2018-05-01 PROCEDURE — 88175 CYTOPATH C/V AUTO FLUID REDO: CPT

## 2018-05-01 NOTE — PROGRESS NOTES
HISTORY OF PRESENT ILLNESS:    Antonia Jeffers is a 38 y.o. female, , Patient's last menstrual period was 2018.,  presents for a routine exam and has no complaints. PAP AND NECON REFILL.  REF MAMMO.  NO C/O  SURG [facility]    LAST VISIT 2017:   PAP DUE 2018.  REFILL OCP BUT WILL CHANGE TO NECON FOR BETTER CYCLE CONTROL - HAS HAD SOME BTB ON CURRENT PILL OVER THE PAST 6-7 MO  LAST VISIT 2016:   PAP DUE  AND REFILL OCP - MONONESSA.  NO SIGNIF GYN ISSUES AT THIS TIME.  SWITCHING LAUNDRY DETERG AND FEELS IMPROVED FROM LAST YEAR.  TO [place] SOON AND ANNIVERSARY TRIP IN November TO [place] . .   LAST VISIT 2015:  SEVERAL ISSUES:  IN PAST HAD OV CYST, THINKS RECURRENT DUE TO TWINGES ON LEFT SIDE - IN PAST BEATRIS HAD DX FUNCTIONAL CYSTS AND THIS FEELS SIMILAR. WILL F/U WITH PELVIC USG. DISCUSSED LOW-DOSE OCP SUPPRESS MOST BUT NOT ALL OVARIAN ACTIVITY; USUALLY DOES WELL WITH CURRENT OCP AND WILL NOT CHANGE, BUT IF RECURRENT EPISODES IN THE FUTURE MIGHT CONSIDER A STRONGER PILL  ABD PAIN, 2 EPISODES, 4 WEEKS AGO AND LAST WEEK, LASTED ABOUT 10 MIN, MIDLINE. HAS HX FIBROMYALGIA AND HARD TO TEASE APART WHAT THAT MIGHT BE CONTRIBUTING, BUT WILL CHECK PELVIC USG AND ADVIS CONT OCP  FEELS LIGHTHEADED ASSOCIATED WITH SEVERE PAIN EPISODES - DISCUSSED    IRREG MENSES PAST 2 MONTHS. LMP 5/5, 3-4 DAYS WITH 2 HEAVY. 4/EARLY ALSO HAD MENSES. HAD MIDCYCLE SPOTTING OVER THE PAST 2 MONTHS    URINE CULTURE SUBMITTED FOR UTI SX OVER THE PAST 3 DAYS; EMPIRIC ABX MACROBID  PAP SUBMITTED, DISCUSSED SCREENING  SPOUSE WITH RECENT INFIDELITY AND WANTS STD SCREEN FOR [facility]    Past Medical History:   Diagnosis Date    Abnormal Pap smear     ABN pap ~17 yo s/p laser cervix and since then all pap has been normal seen only Dr. Luna since    GERD (gastroesophageal reflux disease)     Seasonal allergic rhinitis due to pollen 2017       Past Surgical History:   Procedure Laterality Date      SECTION         MEDICATIONS AND ALLERGIES:      Current Outpatient Prescriptions:     levocetirizine (XYZAL) 5 MG tablet, Take 1 tablet (5 mg total) by mouth every evening., Disp: 30 tablet, Rfl: 11    phentermine (ADIPEX-P) 37.5 mg tablet, 1/2 tab po q am., Disp: 30 tablet, Rfl: 1    topiramate 100 mg Cp24, Take 1 capsule (100 mg total) by mouth once daily., Disp: 30 capsule, Rfl: 5    norethindrone-ethinyl estradiol (NECON) 0.5-35 mg-mcg per tablet, Take 1 tablet by mouth once daily., Disp: 84 tablet, Rfl: 3    Review of patient's allergies indicates:  No Known Allergies    Family History   Problem Relation Age of Onset    Cancer Maternal Grandmother      Brain    Diabetes Maternal Grandmother     Diabetes Father     Hypertension Father     Heart disease Father      A fib    Lymphoma Mother 60    Diabetes Mother     Hypertension Mother     Cancer Mother      follicular lymphoma, B lymphoma x 2    Depression Mother     No Known Problems Son     Clotting disorder Sister     Thyroid disease Sister     Breast cancer Neg Hx     Colon cancer Neg Hx     Ovarian cancer Neg Hx        Social History     Social History    Marital status:      Spouse name: N/A    Number of children: 1    Years of education: N/A     Occupational History    Not on file.     Social History Main Topics    Smoking status: Never Smoker    Smokeless tobacco: Never Used    Alcohol use No      Comment: rarely    Drug use: No    Sexual activity: Yes     Partners: Male     Birth control/ protection: OCP     Other Topics Concern    Not on file     Social History Narrative    ** Merged History Encounter **            COMPREHENSIVE GYN HISTORY:  PAP History: Denies abnormal Paps.  Infection History: Denies STDs. Denies PID.  Benign History: Denies uterine fibroids. Denies ovarian cysts. Denies endometriosis. Denies other conditions.  Cancer History: Denies cervical cancer. Denies uterine cancer or hyperplasia.  "Denies ovarian cancer. Denies vulvar cancer or pre-cancer. Denies vaginal cancer or pre-cancer. Denies breast cancer. Denies colon cancer.  Sexual Activity History: Reports currently being sexually active  Menstrual History: Monthly, mild-moderate.  Contraception:oral contraceptives (estrogen/progesterone)    ROS:  GENERAL: No weight changes. No swelling. No fatigue. No fever.  CARDIOVASCULAR: No chest pain. No shortness of breath. No leg cramps.   NEUROLOGICAL: No headaches. No vision changes.  BREASTS: No pain. No lumps. No discharge.  ABDOMEN: No pain. No nausea. No vomiting. No diarrhea. No constipation.  REPRODUCTIVE: No abnormal bleeding.   VULVA: No pain. No lesions. No itching.  VAGINA: No relaxation. No itching. No odor. No discharge. No lesions.  URINARY: No incontinence. No nocturia. No frequency. No dysuria.    /80   Ht 5' 8" (1.727 m)   Wt 94.3 kg (207 lb 14.3 oz)   LMP 04/05/2018   BMI 31.61 kg/m²     PE:  APPEARANCE: Well nourished, well developed, in no acute distress.  AFFECT: WNL, alert and oriented x 3.  SKIN: No acne or hirsutism.  NECK: Neck symmetric, without masses or thyromegaly.  NODES: No inguinal, cervical, axillary or femoral lymph node enlargement.  CHEST: Good respiratory effort.   ABDOMEN: Soft. No tenderness or masses. No hepatosplenomegaly. No hernias.  BREASTS: Symmetrical, no skin changes, visible lesions, palpable masses or nipple discharge bilaterally.  PELVIC: External female genitalia without lesions.  Female hair distribution. Adequate perineal body, Normal urethral meatus. Vagina moist and well rugated without lesions or discharge.  No significant cystocele or rectocele present. Cervix pink without lesions, discharge or tenderness. Uterus is normal size, regular, mobile and nontender. Adnexa without masses or tenderness.  EXTREMITIES: No edema    PROCEDURES:  Pap    DIAGNOSIS:  1. Visit for gynecologic examination  Liquid-based pap smear, screening   2. Encounter " for surveillance of contraceptive pills     3. Breast cancer screening  Mammo Digital Screening Bilat with Tomosynthesis CAD       LABS AND TESTS ORDERED:    MEDICATIONS PRESCRIBED:    COUNSELING:   The patient was counseled today on ACS PAP guidelines, with recommendations for yearly pelvic exams unless their uterus, cervix, and ovaries were removed for benign reasons; in that case, examinations every 3-5 years are recommended.  The patient was also counseled regarding monthly breast self-examination, routine STD screening for at-risk populations, prophylactic immunizations for transmitted infections such as  HPV, Pertussis, or Influenza as appropriate, and yearly mammograms when indicated by ACS guidelines.  She was advised to see her primary care physician for all other health maintenance.    FOLLOW-UP with me annually.

## 2018-05-10 ENCOUNTER — PATIENT MESSAGE (OUTPATIENT)
Dept: OBSTETRICS AND GYNECOLOGY | Facility: CLINIC | Age: 38
End: 2018-05-10

## 2018-05-18 ENCOUNTER — PATIENT MESSAGE (OUTPATIENT)
Dept: INTERNAL MEDICINE | Facility: CLINIC | Age: 38
End: 2018-05-18

## 2018-05-23 ENCOUNTER — PATIENT MESSAGE (OUTPATIENT)
Dept: INTERNAL MEDICINE | Facility: CLINIC | Age: 38
End: 2018-05-23

## 2018-05-23 ENCOUNTER — OFFICE VISIT (OUTPATIENT)
Dept: INTERNAL MEDICINE | Facility: CLINIC | Age: 38
End: 2018-05-23
Payer: COMMERCIAL

## 2018-05-23 ENCOUNTER — HOSPITAL ENCOUNTER (OUTPATIENT)
Dept: RADIOLOGY | Facility: HOSPITAL | Age: 38
Discharge: HOME | End: 2018-05-23
Attending: INTERNAL MEDICINE
Payer: COMMERCIAL

## 2018-05-23 VITALS
BODY MASS INDEX: 30.84 KG/M2 | SYSTOLIC BLOOD PRESSURE: 120 MMHG | WEIGHT: 202.81 LBS | DIASTOLIC BLOOD PRESSURE: 70 MMHG

## 2018-05-23 DIAGNOSIS — M62.838 TRAPEZIUS MUSCLE SPASM: ICD-10-CM

## 2018-05-23 DIAGNOSIS — M79.7 FIBROMYALGIA: ICD-10-CM
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
## 2018-05-23 PROCEDURE — 99214 OFFICE O/P EST MOD 30 MIN: CPT | Mod: S$GLB,,, | Performed by: INTERNAL MEDICINE

## 2018-05-23 PROCEDURE — 72040 X-RAY EXAM NECK SPINE 2-3 VW: CPT | Mod: TC

## 2018-05-23 PROCEDURE — 72070 X-RAY EXAM THORAC SPINE 2VWS: CPT | Mod: 26,,, | Performed by: RADIOLOGY

## 2018-05-23 PROCEDURE — 72070 X-RAY EXAM THORAC SPINE 2VWS: CPT | Mod: TC

## 2018-05-23 PROCEDURE — 72040 X-RAY EXAM NECK SPINE 2-3 VW: CPT | Mod: 26,,, | Performed by: RADIOLOGY

## 2018-05-23 PROCEDURE — 99999 PR PBB SHADOW E&M-EST. PATIENT-LVL III: CPT | Mod: PBBFAC,,, | Performed by: INTERNAL MEDICINE

## 2018-05-23 RX ORDER — METHOCARBAMOL 500 MG/1
500 TABLET, FILM COATED ORAL 4 TIMES DAILY PRN
Qty: 40 TABLET | Refills: 0 | Status: SHIPPED | OUTPATIENT
Start: 2018-05-23 | End: 2018-06-02
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
## 2018-05-25 ENCOUNTER — PATIENT MESSAGE (OUTPATIENT)
Dept: INTERNAL MEDICINE | Facility: CLINIC | Age: 38
End: 2018-05-25

## 2018-06-05 ENCOUNTER — TELEPHONE (OUTPATIENT)
Dept: INTERNAL MEDICINE | Facility: CLINIC | Age: 38
End: 2018-06-05

## 2018-06-05 ENCOUNTER — OFFICE VISIT (OUTPATIENT)
Dept: INTERNAL MEDICINE | Facility: CLINIC | Age: 38
End: 2018-06-05
Payer: COMMERCIAL

## 2018-06-05 VITALS
HEART RATE: 82 BPM | DIASTOLIC BLOOD PRESSURE: 70 MMHG | HEIGHT: 68 IN | BODY MASS INDEX: 31.41 KG/M2 | SYSTOLIC BLOOD PRESSURE: 126 MMHG | TEMPERATURE: 98 F | OXYGEN SATURATION: 97 % | WEIGHT: 207.25 LBS

## 2018-06-05 DIAGNOSIS — M79.662 PAIN AND SWELLING OF LEFT LOWER LEG: Primary | ICD-10-CM

## 2018-06-05 DIAGNOSIS — M79.89 PAIN AND SWELLING OF LEFT LOWER LEG: Primary | ICD-10-CM

## 2018-06-05 PROCEDURE — 99999 PR PBB SHADOW E&M-EST. PATIENT-LVL III: CPT | Mod: PBBFAC,,, | Performed by: NURSE PRACTITIONER

## 2018-06-05 PROCEDURE — 99213 OFFICE O/P EST LOW 20 MIN: CPT | Mod: S$GLB,,, | Performed by: NURSE PRACTITIONER

## 2018-06-05 NOTE — TELEPHONE ENCOUNTER
"----- Message from Dayana Will sent at 6/5/2018  2:26 PM CDT -----  Contact: [phone]  Patient would like to get medical advice.    Symptoms (please be specific):  Leg Swelling, possibly fluid    How long has patient had these symptoms:  2 dbays    Pharmacy name and phone # (DON'T enter "on file" or "in chart"):  [facility] [id number] [place] [address] [facility] [phone] (Phone)  [phone] (Fax)        Any drug allergies:      Would you prefer a response via [employer]?:  No    Comments:    "

## 2018-06-05 NOTE — PROGRESS NOTES
"Subjective:       Patient ID: Antonia Jeffers is a 38 y.o. female.    Chief Complaint: Leg Pain    HPI:  37 yo female that presents to clinic today for left leg swelling.    States that the symptoms started Thursday night.  Denies any pain or trauma to her leg.  States that she has had problems in the past for plantar fascitis "and isn't sure if that could be causing this."  States that the pain at one point was so bad that "she though about calling and ambulance."  Rates current pain level as a 3/10.  States that she has been taking some naproxen and icing her leg which has helped out some.    Denies any fever, SOB, chest pain, n/v or diarrhea.  Denies any rash or skin discoloration.    Review of Systems   Constitutional: Negative for activity change, appetite change, fatigue and fever.   Respiratory: Negative for apnea, cough, shortness of breath and wheezing.    Cardiovascular: Negative for chest pain, palpitations and leg swelling.   Gastrointestinal: Negative for abdominal distention, constipation, diarrhea, nausea and vomiting.   Musculoskeletal: Positive for arthralgias (left leg). Negative for back pain, neck pain and neck stiffness.   Skin: Negative for color change and rash.   Neurological: Negative for dizziness, light-headedness, numbness and headaches.   Psychiatric/Behavioral: Negative for behavioral problems.       Objective:      Physical Exam   Constitutional: She is oriented to person, place, and time. She appears well-developed and well-nourished. No distress.   Cardiovascular: Normal rate, regular rhythm, normal heart sounds and intact distal pulses.    No murmur heard.  Pulses:       Popliteal pulses are 2+ on the right side, and 2+ on the left side.        Dorsalis pedis pulses are 2+ on the right side, and 2+ on the left side.        Posterior tibial pulses are 2+ on the right side, and 2+ on the left side.   Pulmonary/Chest: Effort normal and breath sounds normal. No respiratory distress. " She has no wheezes. She has no rales.   Musculoskeletal:        Left upper leg: She exhibits tenderness and swelling. She exhibits no edema and no deformity.        Legs:  There is a slight size difference in legs with L > R.  There is mild tenderness with palpation of anterior and posterior lower leg.  Pulses are present through out leg.   Neurological: She is alert and oriented to person, place, and time. No cranial nerve deficit or sensory deficit.   Skin: Skin is warm and dry. Capillary refill takes less than 2 seconds. No erythema.   Psychiatric: Her behavior is normal.       Assessment:       1. Pain and swelling of left lower leg        Plan:       1. Pain and swelling of left lower leg    -Vitals are stable in clinic.  -Will order US venous of left lower extremity to rule out DVT.  -This may also be some type of muscle strain/spasm.  -Can continue to take naproxen as needed for pain.  -Can take robaxin as needed for muscle spasm.  -Can continue cold therapy to left lower extremity to hel with swelling.  -Encouraged to elevate leg to help with swelling.  -Instructed to report to ER if she has severe pain or swelling, chest pain, sob, n/v or dizziness.

## 2018-06-06 ENCOUNTER — TELEPHONE (OUTPATIENT)
Dept: INTERNAL MEDICINE | Facility: CLINIC | Age: 38
End: 2018-06-06

## 2018-06-06 ENCOUNTER — PATIENT MESSAGE (OUTPATIENT)
Dept: INTERNAL MEDICINE | Facility: CLINIC | Age: 38
End: 2018-06-06

## 2018-06-06 DIAGNOSIS — I82.402 ACUTE DEEP VEIN THROMBOSIS (DVT) OF LEFT LOWER EXTREMITY, UNSPECIFIED VEIN: Primary | ICD-10-CM

## 2018-06-06 NOTE — TELEPHONE ENCOUNTER
Zonia, she has a big DVT.   I told her she needs to stop her birth control pills.  I think she will be in touch with you about this.  All best-    Cherry

## 2018-06-06 NOTE — TELEPHONE ENCOUNTER
----- Message from Hemal Sosa sent at 6/6/2018  9:06 AM CDT -----  Contact: Patient   Patient called in wanting to know which location does  recommend patient to go to regarding previous messages, informed patient,  stated in message to go to closest Emergency Room, patient understood and stating will go to [facility].    Thank you

## 2018-06-06 NOTE — TELEPHONE ENCOUNTER
Please call her to let her know that since her leg is more swollen and painful she needs to go to the closest emergency room right now.  She might have a blood clot.

## 2018-06-06 NOTE — TELEPHONE ENCOUNTER
She has a DVT, started on Xarelto.  Natural history reviewed; told to d/c OCPs.      Signs and sx reviewed.  Alarm sx discussed.    Needs Hematology consult- referral is in.   Please assist with appt

## 2018-06-07 ENCOUNTER — PATIENT MESSAGE (OUTPATIENT)
Dept: INTERNAL MEDICINE | Facility: CLINIC | Age: 38
End: 2018-06-07

## 2018-06-09 ENCOUNTER — PATIENT MESSAGE (OUTPATIENT)
Dept: INTERNAL MEDICINE | Facility: CLINIC | Age: 38
End: 2018-06-09

## 2018-06-11 ENCOUNTER — PATIENT MESSAGE (OUTPATIENT)
Dept: BARIATRICS | Facility: CLINIC | Age: 38
End: 2018-06-11

## 2018-06-12 ENCOUNTER — PATIENT MESSAGE (OUTPATIENT)
Dept: OBSTETRICS AND GYNECOLOGY | Facility: CLINIC | Age: 38
End: 2018-06-12

## 2018-06-13 ENCOUNTER — PATIENT MESSAGE (OUTPATIENT)
Dept: OBSTETRICS AND GYNECOLOGY | Facility: CLINIC | Age: 38
End: 2018-06-13

## 2018-06-13 NOTE — TELEPHONE ENCOUNTER
"PHONED PATIENT, LM AND DRAFTED MESSAGE:    I'm so sortry that I missed you, but I hope that my message on your cell reached you this afternoon. . .    Dr Matute relayed to me that you have taken your last estrogen-containing birth control pill ever, due to your recent clot.  I hope that you're beginning to feel better, and once everything has settled down somewhat we'll need to talk about a different birth control plan that won't increase your chance of developing another one.  My first preference would be an IUD that prevents pregnancy but acts locally within the uterus, without significant circulating hormones.  Mirena has the best bleeding control of the available IUDs, which can be an advantage for patients who taking blood thinners.  And condoms, vasectomy, or tubal sterilization would also be hormone-free options that we could discuss.      If none of these options work for you, we could consider a progesterone-only method such as the mini pill, Depo Provera, or a Nexplanon implant - these progesterone-based methods theoretically wouldn't increase your risk of clot even though they all carry the "black box" warning that they can increase risk.  Overall, I'd prefer to avoid hormones altogether,  But pregnancy is a bigger risk for recurrent clot that all of these methods.    Please give me a message or call back and let me know how you're doing and what you're thinking.  I'm sure that we can find something that will work for you and avoid exposure to estrogen, now that you've shown us that you have the capacity to develop a serious clot.  Please also let me know if you have questions or other concerns.  "

## 2018-06-14 ENCOUNTER — OFFICE VISIT (OUTPATIENT)
Dept: INTERNAL MEDICINE | Facility: CLINIC | Age: 38
End: 2018-06-14
Payer: COMMERCIAL

## 2018-06-14 ENCOUNTER — LAB VISIT (OUTPATIENT)
Dept: LAB | Facility: HOSPITAL | Age: 38
End: 2018-06-14
Attending: INTERNAL MEDICINE
Payer: COMMERCIAL

## 2018-06-14 ENCOUNTER — INITIAL CONSULT (OUTPATIENT)
Dept: HEMATOLOGY/ONCOLOGY | Facility: CLINIC | Age: 38
End: 2018-06-14
Payer: COMMERCIAL

## 2018-06-14 VITALS
WEIGHT: 205.69 LBS | BODY MASS INDEX: 31.17 KG/M2 | DIASTOLIC BLOOD PRESSURE: 68 MMHG | SYSTOLIC BLOOD PRESSURE: 105 MMHG | HEIGHT: 68 IN | HEART RATE: 68 BPM

## 2018-06-14 VITALS
DIASTOLIC BLOOD PRESSURE: 68 MMHG | HEIGHT: 68 IN | SYSTOLIC BLOOD PRESSURE: 106 MMHG | BODY MASS INDEX: 31.21 KG/M2 | HEART RATE: 75 BPM | WEIGHT: 205.94 LBS

## 2018-06-14 DIAGNOSIS — Z12.31 SCREENING MAMMOGRAM, ENCOUNTER FOR: ICD-10-CM

## 2018-06-14 DIAGNOSIS — I83.92 VARICOSE VEINS OF LEFT LOWER EXTREMITY: ICD-10-CM

## 2018-06-14 DIAGNOSIS — I82.412 ACUTE DEEP VEIN THROMBOSIS (DVT) OF FEMORAL VEIN OF LEFT LOWER EXTREMITY: ICD-10-CM

## 2018-06-14 DIAGNOSIS — M79.7 FIBROMYALGIA: ICD-10-CM

## 2018-06-14 DIAGNOSIS — I82.412 ACUTE DEEP VEIN THROMBOSIS (DVT) OF FEMORAL VEIN OF LEFT LOWER EXTREMITY: Primary | ICD-10-CM

## 2018-06-14 DIAGNOSIS — E66.9 OBESITY (BMI 30.0-34.9): ICD-10-CM

## 2018-06-14 DIAGNOSIS — Z00.00 ANNUAL PHYSICAL EXAM: Primary | ICD-10-CM

## 2018-06-14 PROCEDURE — 99999 PR PBB SHADOW E&M-EST. PATIENT-LVL III: CPT | Mod: PBBFAC,,, | Performed by: INTERNAL MEDICINE

## 2018-06-14 PROCEDURE — 36415 COLL VENOUS BLD VENIPUNCTURE: CPT

## 2018-06-14 PROCEDURE — 99396 PREV VISIT EST AGE 40-64: CPT | Mod: S$GLB,,, | Performed by: INTERNAL MEDICINE

## 2018-06-14 PROCEDURE — 81241 F5 GENE: CPT

## 2018-06-14 PROCEDURE — 81240 F2 GENE: CPT

## 2018-06-14 PROCEDURE — 99244 OFF/OP CNSLTJ NEW/EST MOD 40: CPT | Mod: S$GLB,,, | Performed by: INTERNAL MEDICINE
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
## 2018-06-14 NOTE — LETTER
June 14, 2018      Cherry Matute MD  [address]  [facility] [address]           [facility]  [address]  [facility] [address]  Phone: [phone]          Patient: Antonia Jeffers   MR Number: [id number]   YOB: 1980   Date of Visit: 6/14/2018       Dear Dr. hCerry Matute:    Thank you for referring Antonia Jeffers to me for evaluation. Attached you will find relevant portions of my assessment and plan of care.    If you have questions, please do not hesitate to call me. I look forward to following Antonia Jeffers along with you.    Sincerely,    Stuart Hernandez Jr., MD    Enclosure  CC:  No Recipients    If you would like to receive this communication electronically, please contact [email or web address] or [phone] to request more information on [facility] access.    For providers and/or their staff who would like to refer a patient to [facility], please contact us through our one-stop-shop provider referral line, [facility], at [phone].    If you feel you have received this communication in error or would no longer like to receive these types of communications, please e-mail [email or web address]

## 2018-06-15 LAB
F2 GENE MUT ANL BLD/T: NORMAL
F5 P.R506Q BLD/T QL: NORMAL

## 2018-06-16 NOTE — TELEPHONE ENCOUNTER
PHONED PATIENT:  Recommended Mirena IUD, discussed approval process and plan for insertion.  Will place order and phone pt when approved; she will check with us in about 3 weeks with NMP if no word yet.  Will ask office to send pamphlet

## 2018-06-18 ENCOUNTER — TELEPHONE (OUTPATIENT)
Dept: HEMATOLOGY/ONCOLOGY | Facility: CLINIC | Age: 38
End: 2018-06-18

## 2018-06-18 ENCOUNTER — PATIENT MESSAGE (OUTPATIENT)
Dept: HEMATOLOGY/ONCOLOGY | Facility: CLINIC | Age: 38
End: 2018-06-18

## 2018-06-18 NOTE — TELEPHONE ENCOUNTER
I let her know that both the factor V Leiden and the Prothrombin gene mutation studies were negative.

## 2018-06-19 ENCOUNTER — PATIENT MESSAGE (OUTPATIENT)
Dept: INTERNAL MEDICINE | Facility: CLINIC | Age: 38
End: 2018-06-19

## 2018-06-21 ENCOUNTER — TELEPHONE (OUTPATIENT)
Dept: INTERNAL MEDICINE | Facility: CLINIC | Age: 38
End: 2018-06-21

## 2018-06-27 ENCOUNTER — TELEPHONE (OUTPATIENT)
Dept: PHARMACY | Facility: CLINIC | Age: 38
End: 2018-06-27

## 2018-06-27 ENCOUNTER — PATIENT MESSAGE (OUTPATIENT)
Dept: OBSTETRICS AND GYNECOLOGY | Facility: CLINIC | Age: 38
End: 2018-06-27

## 2018-06-27 NOTE — TELEPHONE ENCOUNTER
Informed patient that Mirena IUD approved through pharmacy benefits - $0 copay (004). She was given a pamphlet from Dr. Edwards and declines further consultation. She gives consent for [facility] to arrange delivery of the medication to [facility]. Informed patient of potential DDI with Topiramate and Mirena - decreased effect if greater than 200mg of topiramate. Patient is aware, discussed with Dr. Cervantes. Patient had no further questions.    Routing provider to confirm shipment. GUEVARA

## 2018-06-30 ENCOUNTER — PATIENT MESSAGE (OUTPATIENT)
Dept: OBSTETRICS AND GYNECOLOGY | Facility: CLINIC | Age: 38
End: 2018-06-30

## 2018-07-02 ENCOUNTER — PATIENT MESSAGE (OUTPATIENT)
Dept: OBSTETRICS AND GYNECOLOGY | Facility: CLINIC | Age: 38
End: 2018-07-02

## 2018-07-02 ENCOUNTER — TELEPHONE (OUTPATIENT)
Dept: OBSTETRICS AND GYNECOLOGY | Facility: CLINIC | Age: 38
End: 2018-07-02

## 2018-07-02 NOTE — TELEPHONE ENCOUNTER
----- Message from Yovanny Julien sent at 7/2/2018  8:06 AM CDT -----  Contact: pt            Name of Who is Calling: pt      What is the request in detail: pt states she is needing to schedule her Mirena procedure today due to her being on her menstrual cycle. Pt states she left a message on her portal on Saturday as well, in regards to notifying staff of her cycle      Can the clinic reply by [facility]: no      What Number to Call Back if not in [facility]: [phone]

## 2018-07-05 ENCOUNTER — PATIENT MESSAGE (OUTPATIENT)
Dept: HEMATOLOGY/ONCOLOGY | Facility: CLINIC | Age: 38
End: 2018-07-05

## 2018-07-05 DIAGNOSIS — I82.532 CHRONIC DEEP VEIN THROMBOSIS (DVT) OF POPLITEAL VEIN OF LEFT LOWER EXTREMITY: Primary | ICD-10-CM

## 2018-07-06 ENCOUNTER — TELEPHONE (OUTPATIENT)
Dept: OBSTETRICS AND GYNECOLOGY | Facility: CLINIC | Age: 38
End: 2018-07-06

## 2018-07-06 ENCOUNTER — PATIENT MESSAGE (OUTPATIENT)
Dept: OBSTETRICS AND GYNECOLOGY | Facility: CLINIC | Age: 38
End: 2018-07-06

## 2018-07-06 NOTE — TELEPHONE ENCOUNTER
----- Message from Anne Thomason MA sent at 7/5/2018  8:50 AM CDT -----  Contact: Self  Pt is calling to have her Mirena inserted as soon as possible.  The pt can be reached at [phone].  Thanks FL

## 2018-07-09 ENCOUNTER — OFFICE VISIT (OUTPATIENT)
Dept: OBSTETRICS AND GYNECOLOGY | Facility: CLINIC | Age: 38
End: 2018-07-09
Payer: COMMERCIAL

## 2018-07-09 VITALS — BODY MASS INDEX: 30.74 KG/M2 | HEIGHT: 68 IN | WEIGHT: 202.81 LBS

## 2018-07-09 DIAGNOSIS — Z30.430 ENCOUNTER FOR INSERTION OF INTRAUTERINE CONTRACEPTIVE DEVICE (IUD): Primary | ICD-10-CM

## 2018-07-09 PROCEDURE — 58300 INSERT INTRAUTERINE DEVICE: CPT | Mod: S$GLB,,, | Performed by: NURSE PRACTITIONER

## 2018-07-09 PROCEDURE — 99999 PR PBB SHADOW E&M-EST. PATIENT-LVL III: CPT | Mod: PBBFAC,,, | Performed by: NURSE PRACTITIONER

## 2018-07-09 PROCEDURE — 99499 UNLISTED E&M SERVICE: CPT | Mod: S$GLB,,, | Performed by: NURSE PRACTITIONER

## 2018-07-09 NOTE — PROCEDURES
Procedures     IUD PLACEMENT:       Antonia Jeffers is a 38 y.o. female  Patient's last menstrual period was 2018. presents for an IUD placement.    PRE-IUD PLACEMENT COUNSELING:  All contraceptive options were reviewed and the patient chooses an IUD.  The patient's history was reviewed and there are no contraindications to an IUD. The procedure and minimal risks of pain, bleeding, perforation and infection at the insertion and spontaneous expulsion within the first two weeks was discussed. The benefits of amenorrhea and no systemic side effects were explained. All questions were answered and the patient agrees to proceed. Consent was signed (scanned into computer).    PROCEDURE:  UPT negative  A time out was performed.    PELVIC: External female genitalia without lesions.  Female hair distribution. Adequate perineal body, Normal urethral meatus. Vagina moist and well rugated without lesions or discharge. Cervix is pink without lesions, discharge or tenderness. Uterus is 8 week size, RV position, regular, mobile and nontender. Adnexa without masses or tenderness.    PROCEDURE:  A single tooth tenaculum was placed on the anterior cervical lip.  The uterus was sounded to 6 cm using sterile technique.  A Mirena IUD was loaded and placed high in uterine fundus without SOME difficulty using sterile technique due to MILD CERVICAL STENOSIS.  The string was cut to 2cm length from exo cervix.  The tenaculum and speculum were removed.   The patient tolerated the procedure well.    ASSESSMENT:  1. Contraception management / IUD insertion.     POST IUD PLACEMENT COUNSELING:  Manage post IUD placement pain with NSAIDs, Tylenol or Rx per Med[employer].  IUD danger signs and how to check the strings.  Removal in 5 years for Mirena IUD and in 10 years for Copper IUD.    FOLLOW-UP: With me in two weeks for a string check.

## 2018-07-24 ENCOUNTER — TELEPHONE (OUTPATIENT)
Dept: HEMATOLOGY/ONCOLOGY | Facility: CLINIC | Age: 38
End: 2018-07-24

## 2018-07-24 ENCOUNTER — OFFICE VISIT (OUTPATIENT)
Dept: OBSTETRICS AND GYNECOLOGY | Facility: CLINIC | Age: 38
End: 2018-07-24
Payer: COMMERCIAL

## 2018-07-24 VITALS
BODY MASS INDEX: 30.8 KG/M2 | SYSTOLIC BLOOD PRESSURE: 132 MMHG | WEIGHT: 203.25 LBS | HEIGHT: 68 IN | DIASTOLIC BLOOD PRESSURE: 100 MMHG

## 2018-07-24 DIAGNOSIS — Z30.431 ENCOUNTER FOR ROUTINE CHECKING OF INTRAUTERINE CONTRACEPTIVE DEVICE (IUD): Primary | ICD-10-CM

## 2018-07-24 DIAGNOSIS — Z97.5 INTRAUTERINE DEVICE: ICD-10-CM

## 2018-07-24 PROCEDURE — 99024 POSTOP FOLLOW-UP VISIT: CPT | Mod: S$GLB,,, | Performed by: OBSTETRICS & GYNECOLOGY

## 2018-07-24 PROCEDURE — 99999 PR PBB SHADOW E&M-EST. PATIENT-LVL II: CPT | Mod: PBBFAC,,, | Performed by: OBSTETRICS & GYNECOLOGY

## 2018-07-24 NOTE — PROGRESS NOTES
IUD CHECK:    Antonia Jeffers is a 38 y.o. female  presents for IUD check following insertion on 2018 . She is not having any problems with bleeding, cramping, fever or discharge. She is able to feel the strings.    EXAM:  External female genitalia is without lesions.  Vagina is without lesions or discharge.  Cervix is pink without lesions, discharge or tenderness; IUD strings are visible.  Uterus is nontender and strings are palpable.  Adnexa are nontender and without masses.    ASSESSMENT:  1. IUD check / IUD in situ.    COUNSELING:  IUD danger signs and how to check the strings reviewed.    FOLLOW-UP for annual gyn exam or prn.

## 2018-07-24 NOTE — TELEPHONE ENCOUNTER
"----- Message from Stuart Hernandez Jr., MD sent at 7/24/2018 11:28 AM CDT -----  Contact: Pt   I cannot think of any way to connect her feet "falling asleep" to the prior DVT, so she should consult her pcp about this.  AB  ----- Message -----  From: Tahira Baez RN  Sent: 7/24/2018  10:43 AM  To: Stuart Hernandez Jr., MD        ----- Message -----  From: Montana Borges  Sent: 7/24/2018  10:20 AM  To: David HERNANDES Jr Staff    Will like a call from office in regard to feet constantly falling asleep, but pt states no swelling or pain. Pt states this started to happened after her dvt     Contact::[phone]      Patient was told that Dr Hernandez  cannot think of any way to connect her feet "falling asleep" to the prior DVT, so she should consult her pcp about this.      "

## 2018-07-26 ENCOUNTER — HOSPITAL ENCOUNTER (OUTPATIENT)
Dept: RADIOLOGY | Facility: HOSPITAL | Age: 38
Discharge: HOME | End: 2018-07-26
Attending: INTERNAL MEDICINE
Payer: COMMERCIAL

## 2018-07-26 DIAGNOSIS — Z12.31 SCREENING MAMMOGRAM, ENCOUNTER FOR: ICD-10-CM

## 2018-07-26 PROCEDURE — 77067 SCR MAMMO BI INCL CAD: CPT | Mod: 26,,, | Performed by: RADIOLOGY

## 2018-07-26 PROCEDURE — 77063 BREAST TOMOSYNTHESIS BI: CPT | Mod: 26,,, | Performed by: RADIOLOGY

## 2018-07-26 PROCEDURE — 77067 SCR MAMMO BI INCL CAD: CPT | Mod: TC

## 2018-07-27 ENCOUNTER — PATIENT MESSAGE (OUTPATIENT)
Dept: INTERNAL MEDICINE | Facility: CLINIC | Age: 38
End: 2018-07-27

## 2018-07-27 ENCOUNTER — PATIENT MESSAGE (OUTPATIENT)
Dept: OBSTETRICS AND GYNECOLOGY | Facility: CLINIC | Age: 38
End: 2018-07-27

## 2018-08-02 ENCOUNTER — OFFICE VISIT (OUTPATIENT)
Dept: BARIATRICS | Facility: CLINIC | Age: 38
End: 2018-08-02
Payer: COMMERCIAL

## 2018-08-02 VITALS
DIASTOLIC BLOOD PRESSURE: 60 MMHG | SYSTOLIC BLOOD PRESSURE: 110 MMHG | BODY MASS INDEX: 30.61 KG/M2 | HEART RATE: 78 BPM | HEIGHT: 68 IN | WEIGHT: 201.94 LBS

## 2018-08-02 DIAGNOSIS — E66.9 OBESITY, CLASS I, BMI 30.0-34.9 (SEE ACTUAL BMI): Primary | ICD-10-CM

## 2018-08-02 PROCEDURE — 99999 PR PBB SHADOW E&M-EST. PATIENT-LVL III: CPT | Mod: PBBFAC,,, | Performed by: INTERNAL MEDICINE

## 2018-08-02 PROCEDURE — 99213 OFFICE O/P EST LOW 20 MIN: CPT | Mod: S$GLB,,, | Performed by: INTERNAL MEDICINE

## 2018-08-02 RX ORDER — PHENTERMINE HYDROCHLORIDE 37.5 MG/1
TABLET ORAL
Qty: 30 TABLET | Refills: 1 | Status: SHIPPED | OUTPATIENT
Start: 2018-08-02 | End: 2018-11-08

## 2018-08-02 RX ORDER — TOPIRAMATE 100 MG/1
100 CAPSULE, EXTENDED RELEASE ORAL DAILY
Qty: 30 CAPSULE | Refills: 5 | Status: SHIPPED | OUTPATIENT
Start: 2018-08-02 | End: 2018-11-08

## 2018-08-02 NOTE — PATIENT INSTRUCTIONS
Patient was informed that topiramate is used for migraine prevention and seizures. Weight loss is a common side effect that is well documented. She understands this. She was informed of the potential side effects such as serious and possibly fatal rash in which case the medication should be discontinued immediately. Paresthesias, forgetfulness, fatigue, kidney stones, GI symptoms, and changes in lab values such as electrolytes, blood counts and kidney function.     Patient warned of common side effects of phentermine including anxiety, insomnia, palpitations and increased blood pressure. It was also explained that it is for short-term usage along with diet and exercise, and that stopping the medication without making lifestyle changes will result in regain of weight. Patient states understanding.     Weight loss medications are controlled substances.  They require routine follow up. Prescription or pills that are lost or destroyed will not be replaced.       Start phentermine with 1/2 pill a day     Intermittent fasting. NO eating from 8 pm to 12pm. Can have water, tea, coffee in that time without sweeteners.    2 meals made up of the following:  Unlimited green vegetables, tomatoes, mushrooms, spaghetti squash, cauliflower, meat, poultry, seafood, eggs and hard cheeses.   Avoid fried foods  Dressings, seasonings, condiments, etc should have less than 2 g sugars.   Beans or nuts can have 1 x a day.   1-2 servings of citrus fruits, berries, pineapple or melon a day (1/2 cup)  Milk and plain yogurt    No soda, sweet tea, juices or lemonade    No grains, rice, pasta, potatoes or bread.     Www.dietdoctor.com for info on intermittent fasting and recipes
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
## 2018-08-09 ENCOUNTER — PATIENT MESSAGE (OUTPATIENT)
Dept: OBSTETRICS AND GYNECOLOGY | Facility: CLINIC | Age: 38
End: 2018-08-09

## 2018-08-13 ENCOUNTER — PATIENT MESSAGE (OUTPATIENT)
Dept: INTERNAL MEDICINE | Facility: CLINIC | Age: 38
End: 2018-08-13

## 2018-08-25 ENCOUNTER — PATIENT MESSAGE (OUTPATIENT)
Dept: INTERNAL MEDICINE | Facility: CLINIC | Age: 38
End: 2018-08-25

## 2018-09-06 ENCOUNTER — HOSPITAL ENCOUNTER (OUTPATIENT)
Dept: VASCULAR SURGERY | Facility: CLINIC | Age: 38
Discharge: HOME | End: 2018-09-06
Attending: INTERNAL MEDICINE
Payer: COMMERCIAL

## 2018-09-06 DIAGNOSIS — I82.532 CHRONIC DEEP VEIN THROMBOSIS (DVT) OF POPLITEAL VEIN OF LEFT LOWER EXTREMITY: ICD-10-CM

## 2018-09-06 PROCEDURE — 93971 EXTREMITY STUDY: CPT | Mod: S$GLB,,, | Performed by: SURGERY

## 2018-09-07 ENCOUNTER — TELEPHONE (OUTPATIENT)
Dept: HEMATOLOGY/ONCOLOGY | Facility: CLINIC | Age: 38
End: 2018-09-07

## 2018-09-07 DIAGNOSIS — I82.401 ACUTE DEEP VEIN THROMBOSIS (DVT) OF RIGHT LOWER EXTREMITY, UNSPECIFIED VEIN: Primary | ICD-10-CM

## 2018-09-07 NOTE — TELEPHONE ENCOUNTER
----- Message from Triny Vale sent at 9/7/2018  2:40 PM CDT -----  Contact: pt   Pt called to speak with nurse about test results   Callback#[phone]  Thank You  JULIETTE Vale      I called patient and left message to call back.

## 2018-09-10 ENCOUNTER — LAB VISIT (OUTPATIENT)
Dept: LAB | Facility: HOSPITAL | Age: 38
End: 2018-09-10
Attending: INTERNAL MEDICINE
Payer: COMMERCIAL

## 2018-09-10 ENCOUNTER — PATIENT MESSAGE (OUTPATIENT)
Dept: HEMATOLOGY/ONCOLOGY | Facility: CLINIC | Age: 38
End: 2018-09-10

## 2018-09-10 DIAGNOSIS — I82.401 ACUTE DEEP VEIN THROMBOSIS (DVT) OF RIGHT LOWER EXTREMITY, UNSPECIFIED VEIN: ICD-10-CM

## 2018-09-10 PROCEDURE — 36415 COLL VENOUS BLD VENIPUNCTURE: CPT | Mod: PO

## 2018-09-10 PROCEDURE — 85379 FIBRIN DEGRADATION QUANT: CPT

## 2018-09-11 ENCOUNTER — TELEPHONE (OUTPATIENT)
Dept: HEMATOLOGY/ONCOLOGY | Facility: CLINIC | Age: 38
End: 2018-09-11

## 2018-09-11 LAB — D DIMER PPP IA.FEU-MCNC: <0.19 MG/L FEU

## 2018-09-11 NOTE — TELEPHONE ENCOUNTER
Left message.  D-dimer negative.  Last ultrasound negative for thrombosis.  She may stop Xarelto. However, if she still has pain or swelling in left leg, it would be acceptable to continue it at 10 mg daily in an attempt to minimize risk of post phlebitic syndrome.

## 2018-09-12 ENCOUNTER — PATIENT MESSAGE (OUTPATIENT)
Dept: HEMATOLOGY/ONCOLOGY | Facility: CLINIC | Age: 38
End: 2018-09-12

## 2018-09-21 ENCOUNTER — PATIENT MESSAGE (OUTPATIENT)
Dept: INTERNAL MEDICINE | Facility: CLINIC | Age: 38
End: 2018-09-21

## 2018-09-26 ENCOUNTER — PATIENT MESSAGE (OUTPATIENT)
Dept: INTERNAL MEDICINE | Facility: CLINIC | Age: 38
End: 2018-09-26

## 2018-10-04 ENCOUNTER — PATIENT MESSAGE (OUTPATIENT)
Dept: BARIATRICS | Facility: CLINIC | Age: 38
End: 2018-10-04

## 2018-10-11 ENCOUNTER — OFFICE VISIT (OUTPATIENT)
Dept: INTERNAL MEDICINE | Facility: CLINIC | Age: 38
End: 2018-10-11
Payer: COMMERCIAL

## 2018-10-11 VITALS
BODY MASS INDEX: 31.07 KG/M2 | DIASTOLIC BLOOD PRESSURE: 70 MMHG | WEIGHT: 205 LBS | HEIGHT: 68 IN | SYSTOLIC BLOOD PRESSURE: 118 MMHG

## 2018-10-11 DIAGNOSIS — I82.532 CHRONIC DEEP VEIN THROMBOSIS (DVT) OF POPLITEAL VEIN OF LEFT LOWER EXTREMITY: Primary | ICD-10-CM

## 2018-10-11 PROCEDURE — 99214 OFFICE O/P EST MOD 30 MIN: CPT | Mod: S$GLB,,, | Performed by: INTERNAL MEDICINE

## 2018-10-11 PROCEDURE — 99999 PR PBB SHADOW E&M-EST. PATIENT-LVL III: CPT | Mod: PBBFAC,,, | Performed by: INTERNAL MEDICINE

## 2018-10-11 NOTE — PROGRESS NOTES
Subjective:       Patient ID: Antonia Jeffers is a 38 y.o. female.    Chief Complaint: Follow-up    Follow up multiple issues/DVT    Has been followed jointly in Hematology    Recall DVT on June 6th.  Initially told 6 months of anticoagulation.  She has questions about this since was seen by hematology and after all assessment and testing ws told 3 months.  Long discussion about provoked DVT and tx recommendations.    Patient Active Problem List:     Fibromyalgia     Adjustment disorder with mixed anxiety and depressed mood     Seasonal allergic rhinitis due to pollen     Obesity (BMI 30.0-34.9)     Acute deep vein thrombosis (DVT) of femoral vein of left lower extremity     Varicose veins of left lower extremity     Intrauterine device     Chronic deep vein thrombosis (DVT) of popliteal vein of left lower extremity        Review of Systems   Constitutional: Negative for fatigue and fever.   HENT: Negative for congestion and postnasal drip.    Eyes: Negative.    Respiratory: Negative for chest tightness and shortness of breath.    Cardiovascular: Negative.         Slight LLE swelling compared to R  No pain   Gastrointestinal: Negative.    Genitourinary: Negative for hematuria.   Psychiatric/Behavioral:        Some worry about new dx, no thought disorder, SI or HI       Objective:      Physical Exam   Constitutional: She is oriented to person, place, and time. She appears well-developed and well-nourished.   HENT:   Head: Normocephalic and atraumatic.   Right Ear: External ear normal.   Left Ear: External ear normal.   Mouth/Throat: Oropharynx is clear and moist.   Eyes: Conjunctivae are normal.   Neck: Normal range of motion. Neck supple.   Cardiovascular: Normal rate and regular rhythm.   Pulmonary/Chest: No respiratory distress. She has no wheezes.   Musculoskeletal: She exhibits edema.   Slight swelling LLE   No erythema or cords   Neurological: She is alert and oriented to person, place, and time.   Skin: Skin  is warm and dry. No rash noted. No erythema.   Psychiatric: She has a normal mood and affect. Her behavior is normal. Judgment and thought content normal.       Assessment:       1. Chronic deep vein thrombosis (DVT) of popliteal vein of left lower extremity        Plan:         Long discussion about provoked DVT and factors with regard to this, she is now off OCPs and has IUD    Regimen reviewed    All questions answered    Alarm sx reviewed, education provided    Patient counseled for over 50% of the 25 minute appt, all questions answered,  chart reviewed and care coordinated.

## 2018-10-13 PROBLEM — I82.412 ACUTE DEEP VEIN THROMBOSIS (DVT) OF FEMORAL VEIN OF LEFT LOWER EXTREMITY: Status: RESOLVED | Noted: 2018-06-14 | Resolved: 2018-10-13

## 2018-10-22 ENCOUNTER — PATIENT MESSAGE (OUTPATIENT)
Dept: INTERNAL MEDICINE | Facility: CLINIC | Age: 38
End: 2018-10-22

## 2018-11-08 ENCOUNTER — OFFICE VISIT (OUTPATIENT)
Dept: BARIATRICS | Facility: CLINIC | Age: 38
End: 2018-11-08
Payer: COMMERCIAL

## 2018-11-08 VITALS
DIASTOLIC BLOOD PRESSURE: 60 MMHG | SYSTOLIC BLOOD PRESSURE: 110 MMHG | HEIGHT: 68 IN | HEART RATE: 81 BPM | BODY MASS INDEX: 31.54 KG/M2 | WEIGHT: 208.13 LBS

## 2018-11-08 DIAGNOSIS — E66.9 OBESITY, CLASS I, BMI 30.0-34.9 (SEE ACTUAL BMI): Primary | ICD-10-CM

## 2018-11-08 PROCEDURE — 99999 PR PBB SHADOW E&M-EST. PATIENT-LVL III: CPT | Mod: PBBFAC,,, | Performed by: INTERNAL MEDICINE

## 2018-11-08 PROCEDURE — 99214 OFFICE O/P EST MOD 30 MIN: CPT | Mod: S$GLB,,, | Performed by: INTERNAL MEDICINE

## 2018-11-08 NOTE — PROGRESS NOTES
"Subjective:       Patient ID: Antonia Jeffers is a 38 y.o. female.    Chief Complaint: Follow-up    Pt here today for follow-up. Has gained 7 lbs with IF, total net neg 15 lbs. She did message that her appetite is up. Tolerating the trokendi well. She is at the max we can do with her OCPs.  Phentermine filled 10/2/18, and in August before that. It does appear that she must have been taking more than instructed for a while. She filled it early in August. She then messaged asking for increase, which I denied. Had also run out of meds once before in the past.   She has taken bupropion in the past for depression. She did have a SE with abnormal menses when she took the XR formulation. This was releived when she was placed back on SR formula. The dose recorded in chart was 75 mg BID> Of note, Pt was on OCPs at the time. Now has mirena IUD.      Review of Systems   Constitutional: Negative for chills and fever.   Respiratory: Negative for shortness of breath.         + snores   Cardiovascular: Positive for leg swelling. Negative for chest pain.        And hands   Gastrointestinal: Positive for constipation. Negative for diarrhea.        Occasional HB   Genitourinary: Negative for difficulty urinating and menstrual problem.        On OCPS   Musculoskeletal: Positive for arthralgias and back pain.        Knees and upper back   Neurological: Negative for dizziness and light-headedness.   Psychiatric/Behavioral: Negative for dysphoric mood. The patient is nervous/anxious.        Objective:     /60   Pulse 81   Ht 5' 8" (1.727 m)   Wt 94.4 kg (208 lb 1.8 oz)   LMP 10/25/2018   BMI 31.64 kg/m²     Physical Exam   Constitutional: She is oriented to person, place, and time. She appears well-developed. No distress.   Obese   HENT:   Head: Normocephalic and atraumatic.   Eyes: EOM are normal. Pupils are equal, round, and reactive to light. No scleral icterus.   Neck: Normal range of motion. Neck supple. "   Cardiovascular: Normal rate and normal heart sounds.   Pulmonary/Chest: Effort normal and breath sounds normal.   Musculoskeletal: Normal range of motion. She exhibits no edema.   Neurological: She is alert and oriented to person, place, and time. No cranial nerve deficit.   Skin: Skin is warm and dry. No erythema.   Psychiatric: She has a normal mood and affect. Her behavior is normal. Judgment normal.   Vitals reviewed.      Assessment:       1. Obesity, Class I, BMI 30.0-34.9 (see actual BMI)        Plan:         Antonia was seen today for follow-up.    Diagnoses and all orders for this visit:    Obesity, Class I, BMI 30.0-34.9 (see actual BMI)  -     Discontinue: naltrexone-bupropion (CONTRAVE) 8-90 mg TbSR; Take 2 tablets by mouth 2 (two) times daily.    I suspect Pt will continue to take stimulants more than ordered if rxed again. Will d/c.       To taper trokendi, take 1 every other day for 1 week then stop.       Contrave is long term weight loss medication. Side effects may include insomnia, nausea, headache, constipation, depression or change in thinking.  These side effects will improve with stopping the medication.       With regard to the abn menses in the past, it may have been competing metabolism of the OCPs with the Wellbutrin XR in the liver. This may not be an issue with the COntrave and IUD, but we will be caustious and escalate the dose more slowly than usual to have time to monitor for SE.     Please follow the dosing guide below when starting Contrave:  Week 1: One pill in the morning.   Week 2: 1 pill in the morning and evening  STOP HERE. We will make sure that you cycle is not effected. If after a couple of months everything is ok, we could continue to try and increase it if needed.     Week 3: 2 pills in the morning, 1 pill in the evening.   Week 4 and beyond: 2 pills twice a day.     Intermittent fasting. NO eating from 8 pm to 12pm. Can have water, tea, coffee in that time without  sweeteners.    2 meals made up of the following:  Unlimited green vegetables, tomatoes, mushrooms, spaghetti squash, cauliflower, meat, poultry, seafood, eggs and hard cheeses.   Avoid fried foods  Dressings, seasonings, condiments, etc should have less than 2 g sugars.   Beans or nuts can have 1 x a day.   1-2 servings of citrus fruits, berries, pineapple or melon a day (1/2 cup)  Milk and plain yogurt    No soda, sweet tea, juices or lemonade    No grains, rice, pasta, potatoes or bread.     Www.dietdoctor.com for info on intermittent fasting.       25 min face to face time.

## 2018-11-08 NOTE — PATIENT INSTRUCTIONS
To taper trokendi, take 1 every other day for 1 week then stop.     Contrave is long term weight loss medication. Side effects may include insomnia, nausea, headache, constipation, depression or change in thinking.  These side effects will improve with stopping the medication.           Please follow the dosing guide below when starting Contrave:  Week 1: One pill in the morning.   Week 2: 1 pill in the morning and evening  STOP HERE. We will make sure that you cycle is not effected. If after a couple of months everything is ok, we could continue to try and increase it if needed.     Week 3: 2 pills in the morning, 1 pill in the evening.   Week 4 and beyond: 2 pills twice a day.             Intermittent fasting. NO eating from 8 pm to 12pm. Can have water, tea, coffee in that time without sweeteners.    2 meals made up of the following:  Unlimited green vegetables, tomatoes, mushrooms, spaghetti squash, cauliflower, meat, poultry, seafood, eggs and hard cheeses.   Avoid fried foods  Dressings, seasonings, condiments, etc should have less than 2 g sugars.   Beans or nuts can have 1 x a day.   1-2 servings of citrus fruits, berries, pineapple or melon a day (1/2 cup)  Milk and plain yogurt    No soda, sweet tea, juices or lemonade    Www.dietdoctor.[employer] for info on intermittent fasting.       Helpful tips to survive the holidays:  - Dont save yourself for the meal: Make sure you continue to eat high protein small meals every 3-4 hours to ensure to do not become over-hungry. Avoid temptation by not showing up to a holiday party or gathering hungry.   - Plan ahead. Bring a dish to a party if you know there may not be an appropriate option.   - Choose sugar-free drinks: Stick to water or other sugar-free beverages and make sure you are getting 6-8 cups of fluid each day (but not with meals!). Avoid alcohol, carbonated beverages, and high-fat/high-sugar beverages like hot chocolate and eggnog. Try sugar-free hot cocoa  made with skim milk or water, or sugar-free spiced tea to add some holiday flair to your beverage (see sugar-free mulled cider recipe below)  - Take your time: Eat mindfully. Dont graze on food throughout the day. Sit down to enjoy your small meals. Chew slowly and thoroughly. Cut your food into small pieces before eating.  - Listen to your body: Stop eating as soon as you feel full. Do not feel pressured to try certain (or all) foods or to eat all of the food on your plate. Listen to your hunger cues.   - REMEMBER: Make your holidays about spending time with family and friends instead of focusing gatherings around food.  - Keep up your exercise routine: Make sure you continue to get regular exercise throughout the holiday season. Encourage friends and family to be active by taking a walk together after a meal, to look at holiday lights, or to window-shop.    Good Holiday Meal Options:  - Roasted Turkey, NO skin. Use low sodium broth instead of gravy.   - Stuffed Bell Peppers made WITHOUT bread crumbs or Rice. Try using parmesan cheese instead  - Gumbo, NO rice. Try picking out mostly the meat/seafood and vegetables with little broth.   - Green Bean Casserole made with 98% fat free cream of mushroom soup and crushed almonds/pecans instead of fried onions  - Side salad w/ low fat dressing. Try a different kind of salad maybe use Kale or spinach.   - Roasted non-starchy vegetables like brussel sprouts, broccoli, green beans, zucchini, butternut squash, cauliflower  - Cauliflower Mash (steam or roast cauliflower, puree w/ low fat cheese, dash of fat free milk and 2-3 sprays of I cant believe its not butter spray. Add garlic powder and black pepper to season). Use Low sodium broth instead of gravy.   - Try Loaded Cauliflower Mash (Make cauliflower like above cauliflower mash. Top with diced turkey stearns, ¼ cup low fat cheddar cheese and bake @ 350* F for 5-10 minutes, until cheese is melted. Top with minced chives,  black pepper and garlic to taste).   - Homemade cranberry sauce using Splenda or another alternative sweetener. Boil fresh cranberries and add splenda to taste. Boil until cranberries break open and then simmer until it reaches the consistency you want (less time for more watery sauce and simmer for longer to create a thicker sauce).   - Deviled eggs: make using low fat thomason, mustard, DILL relish (not sweet relish).   - Vegetable tray w/ Greek yogurt Ranch Dip. Mix 1 packet of [place] ranch dip mix w/ 16 oz low fat plain greek yogurt.     Good Holiday Dessert Options:  - High protein Pumpkin Cheesecake (see recipe below)  - Pumpkin Whip (see recipe below)  - Quest Apple Pie or Cinnamon Roll flavored protein bar (warm in microwave for 10-15 seconds)  - Eggnog Protein shake (see recipe below)  - Fresh fruit w/ low fat cheese  - Sugar-free Jello Parfaits. Layer Red and Green sugar-free jello in cups and top w/ 2 tbsp Sugar-free cool-whip    Pumpkin Cheesecake    8 ounces fat free cream cheese, softened   2 scoops of vanilla protein powder (<4 g sugar per serving)   ¼ tsp Fine salt   2 eggs, at room temperature   1/3 cup fat free sour cream  1/3 cup fat free half and half  1 15 -ounce can pure pumpkin puree   1 tablespoon pumpkin pie spice, plus more for dusting   Unsalted nuts, crushed  *Add splenda to taste    Directions     1. Preheat the oven to 300 degrees F. Line 18 muffin cups with paper liners. Sprinkle 1 tsp crushed unsalted nuts at the bottom of each of muffin cup liner.     2. In a large bowl, beat the cream cheese, vanilla protein powder and 1/4 teaspoon fine salt on medium-high speed until smooth and creamy, 2 to 3 minutes. Scrape down the sides, reduce speed to low and beat in the eggs, 1 at a time, until combined. Beat in 1/3 cup fat free sour cream and fat free half and half. Stir in the pumpkin puree and pumpkin pie spice until smooth. Divide evenly among cookie-lined paper cups, filling almost  all the way to the top.     3. Bake until the filling is just set, 40 to 45 minutes. A sharp knife inserted into the center will come out moist, but clean. Cool completely in tins on a wire rack. Refrigerate until cold, 4 hours, or overnight. Top with a dusting of pumpkin pie spice.    Recipe altered from the following recipe: http://www.[employer]/recipes/food-network-tristian/mini-pumpkin-cheesecakes-recipe.print.html?oc=linkback    Pumpkin Whip    Box of sugar-free vanilla pudding  Can of pumpkin puree  Pumpkin Pie spice (sprinkle to taste)  ½ cup of sugar-free Cool Whip    Directions:  Make sugar-free pudding according to package directions using fat free or 1% milk. Stir in pumpkin and cool whip. Add pumpkin pie spice to taste.     Egg Nog Protein shake    8 oz skim or 1% milk  1 scoop vanilla protein powder  1 tbsp sugar-free vanilla pudding mix  ½ tsp butter flavor extract  ½ tsp rum extract  ½ tsp cinnamon     Shake together or blend with ice and serve.     Sugar-Free Mulled Cider    3 oz diet cran-apple juice  6 oz water  1 packet sugar-free apple cider mix  ½ tsp apple pie spice  ½ tsp butter flavor extract  1 tbsp Sugar-free Syrup    Mix together. Warm if needed and serve w/ orange wedge and cinnamon stick.

## 2018-11-09 ENCOUNTER — PATIENT MESSAGE (OUTPATIENT)
Dept: BARIATRICS | Facility: CLINIC | Age: 38
End: 2018-11-09

## 2018-11-09 DIAGNOSIS — E66.9 OBESITY, CLASS I, BMI 30.0-34.9 (SEE ACTUAL BMI): ICD-10-CM

## 2018-11-12 ENCOUNTER — TELEPHONE (OUTPATIENT)
Dept: BARIATRICS | Facility: CLINIC | Age: 38
End: 2018-11-12

## 2018-12-10 ENCOUNTER — PATIENT MESSAGE (OUTPATIENT)
Dept: INTERNAL MEDICINE | Facility: CLINIC | Age: 38
End: 2018-12-10

## 2019-02-02 ENCOUNTER — PATIENT MESSAGE (OUTPATIENT)
Dept: INTERNAL MEDICINE | Facility: CLINIC | Age: 39
End: 2019-02-02

## 2019-02-04 ENCOUNTER — PATIENT MESSAGE (OUTPATIENT)
Dept: INTERNAL MEDICINE | Facility: CLINIC | Age: 39
End: 2019-02-04

## 2019-02-07 ENCOUNTER — LAB VISIT (OUTPATIENT)
Dept: LAB | Facility: HOSPITAL | Age: 39
End: 2019-02-07
Attending: INTERNAL MEDICINE
Payer: COMMERCIAL

## 2019-02-07 ENCOUNTER — OFFICE VISIT (OUTPATIENT)
Dept: INTERNAL MEDICINE | Facility: CLINIC | Age: 39
End: 2019-02-07
Payer: COMMERCIAL

## 2019-02-07 VITALS
SYSTOLIC BLOOD PRESSURE: 110 MMHG | DIASTOLIC BLOOD PRESSURE: 60 MMHG | BODY MASS INDEX: 33.08 KG/M2 | HEIGHT: 68 IN | WEIGHT: 218.25 LBS

## 2019-02-07 DIAGNOSIS — R53.83 FATIGUE, UNSPECIFIED TYPE: Primary | ICD-10-CM

## 2019-02-07 DIAGNOSIS — E55.9 MILD VITAMIN D DEFICIENCY: ICD-10-CM

## 2019-02-07 DIAGNOSIS — R53.83 FATIGUE, UNSPECIFIED TYPE: ICD-10-CM

## 2019-02-07 DIAGNOSIS — E66.9 OBESITY (BMI 30.0-34.9): ICD-10-CM

## 2019-02-07 DIAGNOSIS — R06.00 DYSPNEA, UNSPECIFIED TYPE: ICD-10-CM

## 2019-02-07 LAB
25(OH)D3+25(OH)D2 SERPL-MCNC: 27 NG/ML
ALBUMIN SERPL BCP-MCNC: 4.7 G/DL
ALP SERPL-CCNC: 54 U/L
ALT SERPL W/O P-5'-P-CCNC: 22 U/L
ANION GAP SERPL CALC-SCNC: 8 MMOL/L
AST SERPL-CCNC: 21 U/L
BASOPHILS # BLD AUTO: 0.01 K/UL
BASOPHILS NFR BLD: 0.1 %
BILIRUB SERPL-MCNC: 0.6 MG/DL
BUN SERPL-MCNC: 19 MG/DL
CALCIUM SERPL-MCNC: 10.5 MG/DL
CHLORIDE SERPL-SCNC: 107 MMOL/L
CO2 SERPL-SCNC: 25 MMOL/L
CREAT SERPL-MCNC: 0.9 MG/DL
DIFFERENTIAL METHOD: NORMAL
EOSINOPHIL # BLD AUTO: 0 K/UL
EOSINOPHIL NFR BLD: 0.6 %
ERYTHROCYTE [DISTWIDTH] IN BLOOD BY AUTOMATED COUNT: 13 %
EST. GFR  (AFRICAN AMERICAN): >60 ML/MIN/1.73 M^2
EST. GFR  (NON AFRICAN AMERICAN): >60 ML/MIN/1.73 M^2
ESTIMATED AVG GLUCOSE: 100 MG/DL
FERRITIN SERPL-MCNC: 95 NG/ML
GLUCOSE SERPL-MCNC: 86 MG/DL
HBA1C MFR BLD HPLC: 5.1 %
HCT VFR BLD AUTO: 43 %
HGB BLD-MCNC: 14.1 G/DL
IRON SERPL-MCNC: 111 UG/DL
LYMPHOCYTES # BLD AUTO: 2.9 K/UL
LYMPHOCYTES NFR BLD: 40.9 %
MCH RBC QN AUTO: 29.1 PG
MCHC RBC AUTO-ENTMCNC: 32.8 G/DL
MCV RBC AUTO: 89 FL
MONOCYTES # BLD AUTO: 0.4 K/UL
MONOCYTES NFR BLD: 5.7 %
NEUTROPHILS # BLD AUTO: 3.8 K/UL
NEUTROPHILS NFR BLD: 52.6 %
PLATELET # BLD AUTO: 281 K/UL
PMV BLD AUTO: 9.8 FL
POTASSIUM SERPL-SCNC: 4 MMOL/L
PROT SERPL-MCNC: 7.9 G/DL
RBC # BLD AUTO: 4.85 M/UL
SATURATED IRON: 30 %
SODIUM SERPL-SCNC: 140 MMOL/L
TOTAL IRON BINDING CAPACITY: 373 UG/DL
TRANSFERRIN SERPL-MCNC: 252 MG/DL
TSH SERPL DL<=0.005 MIU/L-ACNC: 1.19 UIU/ML
VIT B12 SERPL-MCNC: 861 PG/ML
WBC # BLD AUTO: 7.18 K/UL

## 2019-02-07 PROCEDURE — 83036 HEMOGLOBIN GLYCOSYLATED A1C: CPT

## 2019-02-07 PROCEDURE — 85025 COMPLETE CBC W/AUTO DIFF WBC: CPT

## 2019-02-07 PROCEDURE — 80053 COMPREHEN METABOLIC PANEL: CPT

## 2019-02-07 PROCEDURE — 82306 VITAMIN D 25 HYDROXY: CPT

## 2019-02-07 PROCEDURE — 99999 PR PBB SHADOW E&M-EST. PATIENT-LVL III: ICD-10-PCS | Mod: PBBFAC,,, | Performed by: INTERNAL MEDICINE

## 2019-02-07 PROCEDURE — 83540 ASSAY OF IRON: CPT

## 2019-02-07 PROCEDURE — 99214 PR OFFICE/OUTPT VISIT, EST, LEVL IV, 30-39 MIN: ICD-10-PCS | Mod: S$GLB,,, | Performed by: INTERNAL MEDICINE

## 2019-02-07 PROCEDURE — 82728 ASSAY OF FERRITIN: CPT

## 2019-02-07 PROCEDURE — 82607 VITAMIN B-12: CPT

## 2019-02-07 PROCEDURE — 99214 OFFICE O/P EST MOD 30 MIN: CPT | Mod: S$GLB,,, | Performed by: INTERNAL MEDICINE

## 2019-02-07 PROCEDURE — 36415 COLL VENOUS BLD VENIPUNCTURE: CPT

## 2019-02-07 PROCEDURE — 99999 PR PBB SHADOW E&M-EST. PATIENT-LVL III: CPT | Mod: PBBFAC,,, | Performed by: INTERNAL MEDICINE

## 2019-02-07 PROCEDURE — 84443 ASSAY THYROID STIM HORMONE: CPT

## 2019-02-07 NOTE — PROGRESS NOTES
Subjective:       Patient ID: Antonia Jeffers is a 38 y.o. female.    Chief Complaint: Fatigue    [facility] appt    Fatigue.  Says feeling well but she is exhausted.   Wakes up tired and achy.  She does not think is her fibromyalgia sx.  She has had the sx for about a month. Cold intolerant- always feels cold.  Hair is thin.      Sleeps well, she says falls asleep pretty easily.  Not waking up during the night.  Has not been told she snores or stops breathing.  Her  snores but not too loudly.    Slightly mcnally, irritable.  Denies depression.    Some itching as well.    Not having any periods since she is on Mirena.    Seeing Dr Cervantes and is on meds but despite this she has gained weight.    Recall DVT 2018, see workup, all acceptable.    Patient Active Problem List:     Fibromyalgia     Adjustment disorder with mixed anxiety and depressed mood     Seasonal allergic rhinitis due to pollen     Obesity (BMI 30.0-34.9)     Varicose veins of left lower extremity     Intrauterine device     Chronic deep vein thrombosis (DVT) of popliteal vein of left lower extremity        Review of Systems   Constitutional: Positive for fatigue and unexpected weight change.   Respiratory: Negative for cough and shortness of breath.    Cardiovascular: Negative for chest pain and leg swelling.   Gastrointestinal: Positive for constipation.   Genitourinary:        Not having periods on IUD   Psychiatric/Behavioral:        Stable sx  No SI or HI       Objective:      Physical Exam   Constitutional: She is oriented to person, place, and time. She appears well-developed and well-nourished.   HENT:   Head: Normocephalic and atraumatic.   Right Ear: External ear normal.   Left Ear: External ear normal.   Mouth/Throat: Oropharynx is clear and moist.   Eyes: Conjunctivae are normal.   Neck: Normal range of motion. Neck supple. No thyromegaly present.   Cardiovascular: Normal rate, regular rhythm and normal heart sounds.   Pulmonary/Chest: No  respiratory distress. She has no wheezes. She has no rales.   Abdominal: Soft. Bowel sounds are normal.   Musculoskeletal: She exhibits no edema.   Lymphadenopathy:     She has no cervical adenopathy.   Neurological: She is alert and oriented to person, place, and time.   Skin: Skin is warm and dry. No rash noted. No erythema.       Assessment:       1. Fatigue, unspecified type    2. Obesity (BMI 30.0-34.9)    3. Mild vitamin D deficiency    4. Itching       Plan:         Antonia was seen today for fatigue.    Diagnoses and all orders for this visit:    Fatigue, unspecified type  -     CBC auto differential; Future  -     Comprehensive metabolic panel; Future  -     Iron and TIBC; Future  -     Ferritin; Future  -     Vitamin B12; Future  -     TSH; Future    Obesity (BMI 30.0-34.9):  See below.  Keep bariatric follow-up.  Long discussion about lifestyle issues  -     Hemoglobin A1c; Future    Mild vitamin D deficiency  -     Vitamin D; Future    Itching:  -     X-Ray Chest PA And Lateral; Future    Sleep apnea issues discussed at length, may need to consider reassessment  Exercise, sleep hygiene, portion control, diet issues reviewed  Hydration  Avoid itchy clothing, hot baths.  If pruritus continues will need to consider further assessment    I will review all studies and determine further tx depending on findings

## 2019-02-07 NOTE — PATIENT INSTRUCTIONS
What Are Snoring and Obstructive Sleep Apnea?  If youve ever had a stuffed-up nose, you know the feeling of trying to breathe through a very narrow passageway. This is what happens in your throat when you snore. While you sleep, structures in your throat partially block your air passage, making the passage narrow and hard to breathe through. If the entire passage becomes blocked and you cant breathe at all, you have sleep apnea.      Snoring Obstructive sleep apnea   Snoring  If your throat structures are too large or the muscles relax too much during sleep, the air passage may be partially blocked. As air from the nose or mouth passes around this blockage, the throat structures vibrate, causing the familiar sound of snoring. At times, this sound can be so loud that snorers wake up others, or even themselves, during the night. Snoring gets worse as more and more of the air passage is blocked.  Obstructive sleep apnea  If the structures completely block the throat, air cant flow to the lungs at all. This is called apnea (meaning no breathing). Since the lungs arent getting fresh air, the brain tells the body to wake up just enough to tighten the muscles and unblock the air passage. With a loud gasp, breathing begins again. This process may be repeated over and over again throughout the night, making your sleep fragmented with a lighter stage of sleep. Even though you do not remember waking up many times during the night to a lighter sleep, you feel tired the next day. The lack of sleep and fresh air can also strain your lungs, heart, and other organs, leading to problems such as high blood pressure, heart attack, or stroke.  Problems in the nose and jaw  Problems in the structure of the nose may obstruct breathing. A crooked (deviated) septum or swollen turbinates can make snoring worse or lead to apnea. Also, a receding jaw may make the tongue sit too far back, so its more likely to block the airway when  youre asleep.        Date Last Reviewed: 7/18/2015  © 3290-3120 [employer]. [address] [place] [address], [place] [address]. All rights reserved. This information is not intended as a substitute for professional medical care. Always follow your healthcare professional's instructions.

## 2019-02-08 ENCOUNTER — PATIENT MESSAGE (OUTPATIENT)
Dept: INTERNAL MEDICINE | Facility: CLINIC | Age: 39
End: 2019-02-08

## 2019-02-08 DIAGNOSIS — E55.9 MILD VITAMIN D DEFICIENCY: Primary | ICD-10-CM

## 2019-02-08 RX ORDER — VIT C/E/ZN/COPPR/LUTEIN/ZEAXAN 250MG-90MG
2000 CAPSULE ORAL DAILY
Qty: 60 CAPSULE | Refills: 12 | Status: SHIPPED | OUTPATIENT
Start: 2019-02-08 | End: 2021-04-21

## 2019-02-12 ENCOUNTER — PATIENT MESSAGE (OUTPATIENT)
Dept: INTERNAL MEDICINE | Facility: CLINIC | Age: 39
End: 2019-02-12

## 2019-02-12 ENCOUNTER — HOSPITAL ENCOUNTER (OUTPATIENT)
Dept: RADIOLOGY | Facility: HOSPITAL | Age: 39
Discharge: HOME | End: 2019-02-12
Attending: INTERNAL MEDICINE
Payer: COMMERCIAL

## 2019-02-12 DIAGNOSIS — R06.00 DYSPNEA, UNSPECIFIED TYPE: ICD-10-CM

## 2019-02-12 PROCEDURE — 71046 X-RAY EXAM CHEST 2 VIEWS: CPT | Mod: TC

## 2019-02-12 PROCEDURE — 71046 XR CHEST PA AND LATERAL: ICD-10-PCS | Mod: 26,,, | Performed by: RADIOLOGY

## 2019-02-12 PROCEDURE — 71046 X-RAY EXAM CHEST 2 VIEWS: CPT | Mod: 26,,, | Performed by: RADIOLOGY

## 2019-02-28 ENCOUNTER — OFFICE VISIT (OUTPATIENT)
Dept: BARIATRICS | Facility: CLINIC | Age: 39
End: 2019-02-28
Payer: COMMERCIAL

## 2019-02-28 ENCOUNTER — PATIENT MESSAGE (OUTPATIENT)
Dept: INTERNAL MEDICINE | Facility: CLINIC | Age: 39
End: 2019-02-28

## 2019-02-28 VITALS
WEIGHT: 219.38 LBS | HEART RATE: 78 BPM | HEIGHT: 68 IN | BODY MASS INDEX: 33.25 KG/M2 | DIASTOLIC BLOOD PRESSURE: 55 MMHG | SYSTOLIC BLOOD PRESSURE: 109 MMHG

## 2019-02-28 DIAGNOSIS — E66.9 OBESITY, CLASS I, BMI 30.0-34.9 (SEE ACTUAL BMI): Primary | ICD-10-CM

## 2019-02-28 PROCEDURE — 99213 OFFICE O/P EST LOW 20 MIN: CPT | Mod: S$GLB,,, | Performed by: INTERNAL MEDICINE

## 2019-02-28 PROCEDURE — 99213 PR OFFICE/OUTPT VISIT, EST, LEVL III, 20-29 MIN: ICD-10-PCS | Mod: S$GLB,,, | Performed by: INTERNAL MEDICINE

## 2019-02-28 PROCEDURE — 99999 PR PBB SHADOW E&M-EST. PATIENT-LVL III: ICD-10-PCS | Mod: PBBFAC,,, | Performed by: INTERNAL MEDICINE

## 2019-02-28 PROCEDURE — 99999 PR PBB SHADOW E&M-EST. PATIENT-LVL III: CPT | Mod: PBBFAC,,, | Performed by: INTERNAL MEDICINE

## 2019-02-28 RX ORDER — PHENTERMINE HYDROCHLORIDE 37.5 MG/1
TABLET ORAL
Qty: 30 TABLET | Refills: 1 | Status: SHIPPED | OUTPATIENT
Start: 2019-02-28 | End: 2019-06-06 | Stop reason: SDUPTHER

## 2019-02-28 RX ORDER — TOPIRAMATE 50 MG/1
TABLET, FILM COATED ORAL
Qty: 70 TABLET | Refills: 0 | Status: SHIPPED | OUTPATIENT
Start: 2019-02-28 | End: 2019-06-06 | Stop reason: SDUPTHER

## 2019-02-28 RX ORDER — TOPIRAMATE 100 MG/1
100 TABLET, FILM COATED ORAL 2 TIMES DAILY
Qty: 180 TABLET | Refills: 0 | Status: SHIPPED | OUTPATIENT
Start: 2019-03-28 | End: 2019-06-06 | Stop reason: SDUPTHER

## 2019-02-28 NOTE — PROGRESS NOTES
"Subjective:       Patient ID: Antonia Jeffers is a 38 y.o. female.    Chief Complaint: Follow-up    Pt here today for follow-up. Has gained 11 more lbs with IF, total net neg 4 lbs. Started contrave last OV. Says she did not find that the contrave decreased her appetite. She has stopped it. Was continuing to gain weight while taking it.   Pt also saw her PCP for fatigue. W/u basically normal except that her Vit d was a little low. Says she feels better since starting supplements.   She has developed a tolerance to the stimulants. Added trokendi with no improvement, and was on OCPs at the time, so could not increase.  Saxenda not covered.       Review of Systems   Constitutional: Negative for chills and fever.   Respiratory: Negative for shortness of breath.         + snores   Cardiovascular: Positive for leg swelling. Negative for chest pain.        And hands   Gastrointestinal: Positive for constipation. Negative for diarrhea.        Occasional HB   Genitourinary: Negative for difficulty urinating and menstrual problem.        On OCPS   Musculoskeletal: Positive for arthralgias and back pain.        Knees and upper back   Neurological: Negative for dizziness and light-headedness.   Psychiatric/Behavioral: Negative for dysphoric mood. The patient is nervous/anxious.        Objective:     BP (!) 109/55   Pulse 78   Ht 5' 8" (1.727 m)   Wt 99.5 kg (219 lb 5.7 oz)   BMI 33.35 kg/m²     Physical Exam   Constitutional: She is oriented to person, place, and time. She appears well-developed. No distress.   Obese   HENT:   Head: Normocephalic and atraumatic.   Eyes: EOM are normal. Pupils are equal, round, and reactive to light. No scleral icterus.   Neck: Normal range of motion. Neck supple.   Cardiovascular: Normal rate and normal heart sounds.   Pulmonary/Chest: Effort normal and breath sounds normal.   Musculoskeletal: Normal range of motion. She exhibits no edema.   Neurological: She is alert and oriented to " person, place, and time. No cranial nerve deficit.   Skin: Skin is warm and dry. No erythema.   Psychiatric: She has a normal mood and affect. Her behavior is normal. Judgment normal.   Vitals reviewed.      Assessment:       1. Obesity, Class I, BMI 30.0-34.9 (see actual BMI)        Plan:         Antonia was seen today for follow-up.    Diagnoses and all orders for this visit:    Obesity, Class I, BMI 30.0-34.9 (see actual BMI)  -     phentermine (ADIPEX-P) 37.5 mg tablet; 1/2 tab po daily    Other orders  -     topiramate (TOPAMAX) 50 MG tablet; Week 1: 1 po qhs. Week 2: 1 po BID. Week 3: 1 po qam and 2 po qhs. Week 4: 2 po q am and 2 po qhs.  -     topiramate (TOPAMAX) 100 MG tablet; Take 1 tablet (100 mg total) by mouth 2 (two) times daily.       Did discuss with pt that options are limited after this to GLP-1a, which may haave some limitations with cost.     Intermittent fasting. NO eating from 8 pm to 12pm. Can have water, tea, coffee in that time without sweeteners.    2 meals made up of the following:  Unlimited green vegetables, tomatoes, mushrooms, spaghetti squash, cauliflower, meat, poultry, seafood, eggs and hard cheeses.   Avoid fried foods  Dressings, seasonings, condiments, etc should have less than 2 g sugars.   Beans or nuts can have 1 x a day.   1-2 servings of citrus fruits, berries, pineapple or melon a day (1/2 cup)  Milk and plain yogurt    No soda, sweet tea, juices or lemonade    No grains, rice, pasta, potatoes or bread.     Www.dietdoctor.com for info on intermittent fasting.       Kadie Bustamante Recipes given.

## 2019-02-28 NOTE — PATIENT INSTRUCTIONS
Patient warned of common side effects of phentermine including anxiety, insomnia, palpitations and increased blood pressure. It was also explained that it is for short-term usage along with diet and exercise, and that stopping the medication without making lifestyle changes will result in regain of weight. Patient states understanding.     Weight loss medications are controlled substances.  They require routine follow up. Prescription or pills that are lost or destroyed will not be replaced.       Start phentermine with 1/2 pill     Patient was informed that topiramate is used for migraine prevention and seizures. Weight loss is a common side effect that is well documented. She understands this. She was informed of the potential side effects such as serious and possibly fatal rash in which case the medication should be discontinued immediately. Paresthesias, forgetfulness, fatigue, kidney stones, GI symptoms, and changes in lab values such as electrolytes, blood counts and kidney function.        Intermittent fasting. NO eating from 8 pm to 12pm. Can have water, tea, coffee in that time without sweeteners.    2 meals made up of the following:  Unlimited green vegetables, tomatoes, mushrooms, spaghetti squash, cauliflower, meat, poultry, seafood, eggs and hard cheeses.   Avoid fried foods  Dressings, seasonings, condiments, etc should have less than 2 g sugars.   Beans or nuts can have 1 x a day.   1-2 servings of citrus fruits, berries, pineapple or melon a day (1/2 cup)  Milk and plain yogurt    No soda, sweet tea, juices or lemonade    No grains, rice, pasta, potatoes or bread.     Www.dietdoctor.com for info on intermittent fasting.       January 28, 2019  Hot Spinach and Artichoke Dip (Recipe)  BY CADEN MURILLO RD, CSSD    This creamy spinach dip can double as a protein-rich, gluten-free side dish, game day appetizer or parade route snack.  Calories 85 calories    Fat 3 grams saturated fat    Prep Time 5  minutes  Cook Time10 minutes  Yield Serves 5-10 people  Ingredients   1/2 cup onion, finely chopped   3 (10 ounce) packs of frozen chopped spinach, thawed and squeezed dry   1 (8 ounce) package reduced-fat cream cheese   1 (8 ounce) carton fat-free plain Greek yogurt   1/2 cup Parmesan cheese, grated   1 (14 ounce) can artichoke hearts   1/4 teaspoon salt (optional)   1/4 teaspoon black pepper   1/8 teaspoon crushed red pepper flakes  Instructions  1. Lightly coat a skillet with cooking spray.  2. Cook and stir onion over medium heat until transparent (about 5 minutes).  3. Add spinach. Cook until thoroughly heated (about 1-2 minutes).  4. Reduce heat and add cream cheese. Stir until melted and smooth.  5. Stir in Greek yogurt, Parmesan cheese and artichokes. Remove from heat.  6. Season with salt (optional) and black and red pepper.  7. Serve with raw vegetables.                          January 31, 2019  Pizza Cups (Recipe)    Trying to eat better but craving pizza? These protein-packed pizza cups will do the trick.    Calories 65 calories per cup  Fat 2.7 grams per cup  Prep Time5 minutes  Cook Time 25 minutes  Yield 12 pizza cups  Ingredients   1 ½ cups liquid egg whites   2 large eggs   1 cup fat free or low moisture shredded mozzarella cheese   37 turkey pepperonis (25 chopped and 12 sliced)   ¼ cup Parmesan cheese   ¼ tsp oregano   ¼ tsp black pepper  Instructions  1. Preheat oven to 350 degrees Fahrenheit.  2. Chop up 25 turkey pepperonis into small pieces. In a bowl, combine all ingredients except the remaining 12 sliced turkey pepperoni.  3. Spray a muffin [place] with nonstick spray.  4. Place a whole sliced turkey pepperoni in the bottom of each of the 12 muffin molds.  5. Pour or spoon in the mixture in your bowl into each muffin mold evenly ¾ full.  6. Bake in the oven for 20-25 minutes. Place a toothpick in the center of the pizza cup to ensure all liquid egg has cooked. For a crispier  pizza cup, leave in the oven a little longer.  7. Let cool for a few minutes before serving. Enjoy!

## 2019-03-29 ENCOUNTER — OFFICE VISIT (OUTPATIENT)
Dept: INTERNAL MEDICINE | Facility: CLINIC | Age: 39
End: 2019-03-29
Payer: COMMERCIAL

## 2019-03-29 ENCOUNTER — PATIENT MESSAGE (OUTPATIENT)
Dept: INTERNAL MEDICINE | Facility: CLINIC | Age: 39
End: 2019-03-29

## 2019-03-29 VITALS
HEIGHT: 68 IN | OXYGEN SATURATION: 100 % | HEART RATE: 73 BPM | DIASTOLIC BLOOD PRESSURE: 80 MMHG | SYSTOLIC BLOOD PRESSURE: 100 MMHG | WEIGHT: 221.56 LBS | BODY MASS INDEX: 33.58 KG/M2

## 2019-03-29 DIAGNOSIS — Z12.31 SCREENING MAMMOGRAM, ENCOUNTER FOR: ICD-10-CM

## 2019-03-29 DIAGNOSIS — L02.31 CELLULITIS AND ABSCESS OF BUTTOCK: Primary | ICD-10-CM

## 2019-03-29 DIAGNOSIS — L03.317 CELLULITIS AND ABSCESS OF BUTTOCK: Primary | ICD-10-CM

## 2019-03-29 PROCEDURE — 99214 OFFICE O/P EST MOD 30 MIN: CPT | Mod: 25,S$GLB,, | Performed by: INTERNAL MEDICINE

## 2019-03-29 PROCEDURE — 96372 THER/PROPH/DIAG INJ SC/IM: CPT | Mod: S$GLB,,, | Performed by: INTERNAL MEDICINE

## 2019-03-29 PROCEDURE — 99999 PR PBB SHADOW E&M-EST. PATIENT-LVL III: ICD-10-PCS | Mod: PBBFAC,,, | Performed by: INTERNAL MEDICINE

## 2019-03-29 PROCEDURE — 96372 PR INJECTION,THERAP/PROPH/DIAG2ST, IM OR SUBCUT: ICD-10-PCS | Mod: S$GLB,,, | Performed by: INTERNAL MEDICINE

## 2019-03-29 PROCEDURE — 99214 PR OFFICE/OUTPT VISIT, EST, LEVL IV, 30-39 MIN: ICD-10-PCS | Mod: 25,S$GLB,, | Performed by: INTERNAL MEDICINE

## 2019-03-29 PROCEDURE — 99999 PR PBB SHADOW E&M-EST. PATIENT-LVL III: CPT | Mod: PBBFAC,,, | Performed by: INTERNAL MEDICINE

## 2019-03-29 RX ORDER — CEPHALEXIN 500 MG/1
500 CAPSULE ORAL 4 TIMES DAILY
Qty: 40 CAPSULE | Refills: 0 | Status: SHIPPED | OUTPATIENT
Start: 2019-03-29 | End: 2019-06-06

## 2019-03-29 RX ORDER — CEFTRIAXONE 500 MG/1
500 INJECTION, POWDER, FOR SOLUTION INTRAMUSCULAR; INTRAVENOUS
Status: COMPLETED | OUTPATIENT
Start: 2019-03-29 | End: 2019-03-29

## 2019-03-29 RX ADMIN — CEFTRIAXONE 500 MG: 500 INJECTION, POWDER, FOR SOLUTION INTRAMUSCULAR; INTRAVENOUS at 02:03

## 2019-03-29 NOTE — PROGRESS NOTES
Subjective:       Patient ID: Antonia Jeffers is a 38 y.o. female.    Chief Complaint: Insect Bite (right side)    [facility] appt     [facility] with her.    Wednesday am woke up and noted a bump or lesion on her buttock.  She is not sure if it was a bite.  It was red.  Has tried alcohol and benadryl.  It seems like it has gotten bigger in the past 2 days.    Sore all over buttock.    No fever, chills or sweats.  Slight backache.    Review of Systems   Constitutional: Negative for chills, fatigue and fever.   HENT: Negative for congestion and postnasal drip.    Eyes: Negative.    Respiratory: Negative for cough, chest tightness, shortness of breath and wheezing.    Gastrointestinal: Negative.    Skin: Positive for rash.       Objective:      Physical Exam   Constitutional: She is oriented to person, place, and time. She appears well-developed and well-nourished.   HENT:   Head: Normocephalic and atraumatic.   Right Ear: External ear normal.   Left Ear: External ear normal.   Mouth/Throat: Oropharynx is clear and moist.   Eyes: Conjunctivae are normal.   Neck: Normal range of motion. Neck supple. No thyromegaly present.   Cardiovascular: Normal rate and regular rhythm.   Pulmonary/Chest: No respiratory distress. She has no wheezes. She has no rales.   Abdominal: Soft.   Musculoskeletal: She exhibits no edema.   Lymphadenopathy:     She has no cervical adenopathy.   Neurological: She is alert and oriented to person, place, and time.   Skin: Skin is warm and dry. No rash noted. There is erythema.   Cellulitis R buttock with central lesion  Erythema measures 5 cm  Not fluctuant       Assessment:       1. Cellulitis and abscess of buttock    2. Screening mammogram, encounter for        Plan:     Antonia was seen today for insect bite.    Diagnoses and all orders for this visit:    Cellulitis and abscess of buttock    Screening mammogram, encounter for  -     Mammo Digital Screening Bilat w/ Leon; Standing    Other orders  -      cefTRIAXone injection 500 mg  -     cephALEXin (KEFLEX) 500 MG capsule; Take 1 capsule (500 mg total) by mouth 4 (four) times daily.    Warm compresses  Hygienic measures and natural history reviewed  Follow up poor results- alarm sx reviewed

## 2019-04-02 ENCOUNTER — PATIENT MESSAGE (OUTPATIENT)
Dept: INTERNAL MEDICINE | Facility: CLINIC | Age: 39
End: 2019-04-02

## 2019-04-11 ENCOUNTER — PATIENT MESSAGE (OUTPATIENT)
Dept: INTERNAL MEDICINE | Facility: CLINIC | Age: 39
End: 2019-04-11

## 2019-06-06 ENCOUNTER — OFFICE VISIT (OUTPATIENT)
Dept: BARIATRICS | Facility: CLINIC | Age: 39
End: 2019-06-06
Payer: COMMERCIAL

## 2019-06-06 VITALS
BODY MASS INDEX: 32.55 KG/M2 | DIASTOLIC BLOOD PRESSURE: 62 MMHG | HEART RATE: 76 BPM | HEIGHT: 68 IN | SYSTOLIC BLOOD PRESSURE: 112 MMHG | WEIGHT: 214.75 LBS

## 2019-06-06 DIAGNOSIS — E66.9 OBESITY, CLASS I, BMI 30.0-34.9 (SEE ACTUAL BMI): Primary | ICD-10-CM

## 2019-06-06 PROCEDURE — 99999 PR PBB SHADOW E&M-EST. PATIENT-LVL III: CPT | Mod: PBBFAC,,, | Performed by: INTERNAL MEDICINE

## 2019-06-06 PROCEDURE — 99213 PR OFFICE/OUTPT VISIT, EST, LEVL III, 20-29 MIN: ICD-10-PCS | Mod: S$GLB,,, | Performed by: INTERNAL MEDICINE

## 2019-06-06 PROCEDURE — 99999 PR PBB SHADOW E&M-EST. PATIENT-LVL III: ICD-10-PCS | Mod: PBBFAC,,, | Performed by: INTERNAL MEDICINE

## 2019-06-06 PROCEDURE — 99213 OFFICE O/P EST LOW 20 MIN: CPT | Mod: S$GLB,,, | Performed by: INTERNAL MEDICINE

## 2019-06-06 RX ORDER — PHENTERMINE HYDROCHLORIDE 37.5 MG/1
TABLET ORAL
Qty: 30 TABLET | Refills: 1 | Status: SHIPPED | OUTPATIENT
Start: 2019-06-06 | End: 2019-09-12 | Stop reason: SDUPTHER

## 2019-06-06 RX ORDER — TOPIRAMATE 100 MG/1
100 TABLET, FILM COATED ORAL 2 TIMES DAILY
Qty: 180 TABLET | Refills: 0 | Status: SHIPPED | OUTPATIENT
Start: 2019-06-06 | End: 2019-09-12 | Stop reason: SDUPTHER

## 2019-06-06 NOTE — PATIENT INSTRUCTIONS
Patient was informed that topiramate is used for migraine prevention and seizures. Weight loss is a common side effect that is well documented. S/he understands this. S/he was informed of the potential side effects such as serious and possibly fatal rash in which case the medication should be discontinued immediately. Paresthesias, forgetfulness, fatigue, kidney stones, GI symptoms, and changes in lab values such as electrolytes, blood counts and kidney function.    Patient warned of common side effects of phentermine including anxiety, insomnia, palpitations and increased blood pressure. It was also explained that it is for short-term usage along with diet and exercise, and that stopping the medication without making lifestyle changes will result in regain of weight. Patient states understanding.     Weight loss medications are controlled substances.  They require routine follow up. Prescription or pills that are lost or destroyed will not be replaced.         Intermittent fasting. NO eating from 8 pm to 12pm. Can have water, tea, coffee in that time without sweeteners.    2 meals made up of the following:  Unlimited green vegetables, tomatoes, mushrooms, spaghetti squash, cauliflower, meat, poultry, seafood, eggs and hard cheeses.   Avoid fried foods  Dressings, seasonings, condiments, etc should have less than 2 g sugars.   Beans or nuts can have 1 x a day.   1-2 servings of citrus fruits, berries, pineapple or melon a day (1/2 cup)  Milk and plain yogurt    No soda, sweet tea, juices or lemonade    No grains, rice, pasta, potatoes or bread.     Www.dietdoctor.com for info on intermittent fasting.       Cauliflower Pizza Crust  Recipe courtesy of Jalyn Azar    Cauliflower Pizza Crust   Total Time: 40 min   Prep: 5 min   Inactive: 10 min   Cook:25 min     Yield: 1 pizza crust   Level: Easy   Ingredients   1 head cauliflower, stalk removed    1/2 cup shredded mozzarella    1/4 cup grated Parmesan    1/2 teaspoon  dried oregano    1/2 teaspoon kosher salt    1/4 teaspoon garlic powder    2 eggs, lightly beaten   Directions  Preheat the oven to 400 degrees F. Line a baking sheet with parchment paper.  Break the cauliflower into florets and pulse in a  until fine. Steam in a steamer basket and drain well. (I like to put it on a towel to get all the moisture out.) Let cool.  In a bowl, combine the cauliflower with the mozzarella, Parmesan, oregano, salt, garlic powder and eggs. Transfer to the center of the baking sheet and spread into a [place], resembling a pizza crust. Bake for 20 minutes.  Add desired toppings and bake an additional 10 minutes.  Recipe courtesy of Jalyn Azar © 2015 [employer] All Rights Reserved.

## 2019-06-06 NOTE — PROGRESS NOTES
"Subjective:       Patient ID: Antonia Jeffers is a 39 y.o. female.    Chief Complaint: Follow-up    Pt here today for follow-up. Has lost 5  lbs with IF, total net neg 9 lbs.   checked today. LAst Rx for phentermine given does not show up, and pt states she lost the Rx. She feels the topiramate BID is working well.    Has been doing more exercise feels she is losing clothes.  She had developed a tolerance to the stimulants. Added trokendi , and once she got IUD we were able to titrate it upward. Was on contrave in past Says she did not find that the contrave decreased her appetite. She has stopped it. Was continuing to gain weight while taking it.       Review of Systems   Constitutional: Negative for chills and fever.   Respiratory: Negative for shortness of breath.         + snores   Cardiovascular: Positive for leg swelling. Negative for chest pain.        And hands   Gastrointestinal: Positive for constipation. Negative for diarrhea.        Occasional HB   Genitourinary: Negative for difficulty urinating and menstrual problem.        On OCPS   Musculoskeletal: Positive for arthralgias and back pain.        Knees and upper back   Neurological: Negative for dizziness and light-headedness.   Psychiatric/Behavioral: Negative for dysphoric mood. The patient is nervous/anxious.        Objective:     /62   Pulse 76   Ht 5' 8" (1.727 m)   Wt 97.4 kg (214 lb 11.7 oz)   LMP 09/15/2018 (LMP Unknown)   BMI 32.65 kg/m²     Physical Exam   Constitutional: She is oriented to person, place, and time. She appears well-developed. No distress.   Obese   HENT:   Head: Normocephalic and atraumatic.   Eyes: Pupils are equal, round, and reactive to light. EOM are normal. No scleral icterus.   Neck: Normal range of motion. Neck supple.   Cardiovascular: Normal rate.   Pulmonary/Chest: Effort normal.   Musculoskeletal: Normal range of motion. She exhibits no edema.   Neurological: She is alert and oriented to person, " place, and time. No cranial nerve deficit.   Skin: Skin is warm and dry. No erythema.   Psychiatric: She has a normal mood and affect. Her behavior is normal. Judgment normal.   Vitals reviewed.      Assessment:       1. Obesity, Class I, BMI 30.0-34.9 (see actual BMI)        Plan:         Antonia was seen today for follow-up.    Diagnoses and all orders for this visit:    Obesity, Class I, BMI 30.0-34.9 (see actual BMI)  -     phentermine (ADIPEX-P) 37.5 mg tablet; 1/2 tab po daily    Other orders  -     topiramate (TOPAMAX) 100 MG tablet; Take 1 tablet (100 mg total) by mouth 2 (two) times daily.     Patient was informed that topiramate is used for migraine prevention and seizures. Weight loss is a common side effect that is well documented. S/he understands this. S/he was informed of the potential side effects such as serious and possibly fatal rash in which case the medication should be discontinued immediately. Paresthesias, forgetfulness, fatigue, kidney stones, GI symptoms, and changes in lab values such as electrolytes, blood counts and kidney function.    Patient warned of common side effects of phentermine including anxiety, insomnia, palpitations and increased blood pressure. It was also explained that it is for short-term usage along with diet and exercise, and that stopping the medication without making lifestyle changes will result in regain of weight. Patient states understanding.     Weight loss medications are controlled substances.  They require routine follow up. Prescription or pills that are lost or destroyed will not be replaced.           Intermittent fasting. NO eating from 8 pm to 12pm. Can have water, tea, coffee in that time without sweeteners.    2 meals made up of the following:  Unlimited green vegetables, tomatoes, mushrooms, spaghetti squash, cauliflower, meat, poultry, seafood, eggs and hard cheeses.   Avoid fried foods  Dressings, seasonings, condiments, etc should have less than 2 g  sugars.   Beans or nuts can have 1 x a day.   1-2 servings of citrus fruits, berries, pineapple or melon a day (1/2 cup)  Milk and plain yogurt    No soda, sweet tea, juices or lemonade    No grains, rice, pasta, potatoes or bread.     [email or web address] for info on intermittent fasting.       Brendaliforemberto pizza Recipe given.

## 2019-08-01 ENCOUNTER — HOSPITAL ENCOUNTER (OUTPATIENT)
Dept: RADIOLOGY | Facility: HOSPITAL | Age: 39
Discharge: HOME | End: 2019-08-01
Attending: INTERNAL MEDICINE
Payer: COMMERCIAL

## 2019-08-01 ENCOUNTER — PATIENT MESSAGE (OUTPATIENT)
Dept: INTERNAL MEDICINE | Facility: CLINIC | Age: 39
End: 2019-08-01

## 2019-08-01 DIAGNOSIS — Z12.31 SCREENING MAMMOGRAM, ENCOUNTER FOR: ICD-10-CM

## 2019-08-01 PROCEDURE — 77067 SCR MAMMO BI INCL CAD: CPT | Mod: 26,,, | Performed by: RADIOLOGY

## 2019-08-01 PROCEDURE — 77063 MAMMO DIGITAL SCREENING BILAT WITH TOMOSYNTHESIS_CAD: ICD-10-PCS | Mod: 26,,, | Performed by: RADIOLOGY

## 2019-08-01 PROCEDURE — 77067 MAMMO DIGITAL SCREENING BILAT WITH TOMOSYNTHESIS_CAD: ICD-10-PCS | Mod: 26,,, | Performed by: RADIOLOGY

## 2019-08-01 PROCEDURE — 77067 SCR MAMMO BI INCL CAD: CPT | Mod: TC

## 2019-08-01 PROCEDURE — 77063 BREAST TOMOSYNTHESIS BI: CPT | Mod: 26,,, | Performed by: RADIOLOGY

## 2019-09-12 ENCOUNTER — OFFICE VISIT (OUTPATIENT)
Dept: BARIATRICS | Facility: CLINIC | Age: 39
End: 2019-09-12
Payer: COMMERCIAL

## 2019-09-12 VITALS
BODY MASS INDEX: 32.58 KG/M2 | DIASTOLIC BLOOD PRESSURE: 74 MMHG | SYSTOLIC BLOOD PRESSURE: 118 MMHG | HEART RATE: 72 BPM | HEIGHT: 68 IN | WEIGHT: 214.94 LBS

## 2019-09-12 DIAGNOSIS — E66.9 OBESITY, CLASS I, BMI 30.0-34.9 (SEE ACTUAL BMI): Primary | ICD-10-CM

## 2019-09-12 PROCEDURE — 99999 PR PBB SHADOW E&M-EST. PATIENT-LVL III: ICD-10-PCS | Mod: PBBFAC,,, | Performed by: INTERNAL MEDICINE

## 2019-09-12 PROCEDURE — 99213 PR OFFICE/OUTPT VISIT, EST, LEVL III, 20-29 MIN: ICD-10-PCS | Mod: S$GLB,,, | Performed by: INTERNAL MEDICINE

## 2019-09-12 PROCEDURE — 99999 PR PBB SHADOW E&M-EST. PATIENT-LVL III: CPT | Mod: PBBFAC,,, | Performed by: INTERNAL MEDICINE

## 2019-09-12 PROCEDURE — 99213 OFFICE O/P EST LOW 20 MIN: CPT | Mod: S$GLB,,, | Performed by: INTERNAL MEDICINE

## 2019-09-12 RX ORDER — TOPIRAMATE 100 MG/1
100 TABLET, FILM COATED ORAL 2 TIMES DAILY
Qty: 180 TABLET | Refills: 0 | Status: SHIPPED | OUTPATIENT
Start: 2019-09-12 | End: 2020-05-15

## 2019-09-12 RX ORDER — PHENTERMINE HYDROCHLORIDE 37.5 MG/1
TABLET ORAL
Qty: 30 TABLET | Refills: 1 | Status: SHIPPED | OUTPATIENT
Start: 2019-09-12 | End: 2020-05-15

## 2019-09-12 NOTE — PROGRESS NOTES
"Subjective:       Patient ID: Antonia Jeffers is a 39 y.o. female.    Chief Complaint: Follow-up    Pt here today for follow-up. Has lost 0 more lbs with IF, total net neg 9 lbs.   checked today. Last Rx for phentermine filled 7/14/19. She feels the topiramate BID is working well.  States shehas been feeling tired lately. She states she is sleeping ok.   Has walking for exercise.  She had developed a tolerance to the stimulants. Added trokendi , and once she got IUD we were able to titrate it upward.  Did not find that the contrave decreased her appetite. Stopped it. Was continuing to gain weight while taking it.       Follow-up   Associated symptoms include arthralgias. Pertinent negatives include no chest pain, chills or fever.     Review of Systems   Constitutional: Negative for chills and fever.   Respiratory: Negative for shortness of breath.         + snores   Cardiovascular: Positive for leg swelling. Negative for chest pain.        And hands   Gastrointestinal: Positive for constipation. Negative for diarrhea.        Occasional HB   Genitourinary: Negative for difficulty urinating and menstrual problem.        On OCPS   Musculoskeletal: Positive for arthralgias and back pain.        Knees and upper back   Neurological: Negative for dizziness and light-headedness.   Psychiatric/Behavioral: Negative for dysphoric mood. The patient is nervous/anxious.        Objective:     /74   Pulse 72   Ht 5' 8" (1.727 m)   Wt 97.5 kg (214 lb 15.2 oz)   LMP 09/11/2018   BMI 32.68 kg/m²     Physical Exam   Constitutional: She is oriented to person, place, and time. She appears well-developed. No distress.   Obese   HENT:   Head: Normocephalic and atraumatic.   Eyes: Pupils are equal, round, and reactive to light. EOM are normal. No scleral icterus.   Neck: Normal range of motion. Neck supple.   Cardiovascular: Normal rate.   Pulmonary/Chest: Effort normal.   Musculoskeletal: Normal range of motion. She exhibits " no edema.   Neurological: She is alert and oriented to person, place, and time. No cranial nerve deficit.   Skin: Skin is warm and dry. No erythema.   Psychiatric: She has a normal mood and affect. Her behavior is normal. Judgment normal.   Vitals reviewed.      Assessment:       No diagnosis found.    Plan:         There are no diagnoses linked to this encounter.     Patient was informed that topiramate is used for migraine prevention and seizures. Weight loss is a common side effect that is well documented. S/he understands this. S/he was informed of the potential side effects such as serious and possibly fatal rash in which case the medication should be discontinued immediately. Paresthesias, forgetfulness, fatigue, kidney stones, GI symptoms, and changes in lab values such as electrolytes, blood counts and kidney function.    Patient warned of common side effects of phentermine including anxiety, insomnia, palpitations and increased blood pressure. It was also explained that it is for short-term usage along with diet and exercise, and that stopping the medication without making lifestyle changes will result in regain of weight. Patient states understanding.     Weight loss medications are controlled substances.  They require routine follow up. Prescription or pills that are lost or destroyed will not be replaced.           Intermittent fasting. NO eating from 8 pm to 12pm. Can have water, tea, coffee in that time without sweeteners.    2 meals made up of the following:  Unlimited green vegetables, tomatoes, mushrooms, spaghetti squash, cauliflower, meat, poultry, seafood, eggs and hard cheeses.   Avoid fried foods  Dressings, seasonings, condiments, etc should have less than 2 g sugars.   Beans or nuts can have 1 x a day.   1-2 servings of citrus fruits, berries, pineapple or melon a day (1/2 cup)  Milk and plain yogurt    No soda, sweet tea, juices or lemonade    No grains, rice, pasta, potatoes or bread.      Www.dietdoctor.com for info on intermittent fasting.

## 2019-09-12 NOTE — PATIENT INSTRUCTIONS
.  Patient was informed that topiramate is used for migraine prevention and seizures. Weight loss is a common side effect that is well documented. S/he understands this. S/he was informed of the potential side effects such as serious and possibly fatal rash in which case the medication should be discontinued immediately. Paresthesias, forgetfulness, fatigue, kidney stones, GI symptoms, and changes in lab values such as electrolytes, blood counts and kidney function.    Patient warned of common side effects of phentermine including anxiety, insomnia, palpitations and increased blood pressure. It was also explained that it is for short-term usage along with diet and exercise, and that stopping the medication without making lifestyle changes will result in regain of weight. Patient states understanding.     Weight loss medications are controlled substances.  They require routine follow up. Prescription or pills that are lost or destroyed will not be replaced.           Intermittent fasting. NO eating from 8 pm to 12pm. Can have water, tea, coffee in that time without sweeteners.    2 meals made up of the following:  Unlimited green vegetables, tomatoes, mushrooms, spaghetti squash, cauliflower, meat, poultry, seafood, eggs and hard cheeses.   Avoid fried foods  Dressings, seasonings, condiments, etc should have less than 2 g sugars.   Beans or nuts can have 1 x a day.   1-2 servings of citrus fruits, berries, pineapple or melon a day (1/2 cup)  Milk and plain yogurt    No soda, sweet tea, juices or lemonade    No grains, rice, pasta, potatoes or bread.     Www.dietdoctor.com for info on intermittent fasting.

## 2020-01-14 ENCOUNTER — PATIENT OUTREACH (OUTPATIENT)
Dept: ADMINISTRATIVE | Facility: OTHER | Age: 40
End: 2020-01-14

## 2020-04-07 ENCOUNTER — TELEPHONE (OUTPATIENT)
Dept: INTERNAL MEDICINE | Facility: CLINIC | Age: 40
End: 2020-04-07

## 2020-04-07 ENCOUNTER — CLINICAL SUPPORT (OUTPATIENT)
Dept: INTERNAL MEDICINE | Facility: CLINIC | Age: 40
End: 2020-04-07
Payer: COMMERCIAL

## 2020-04-07 ENCOUNTER — PATIENT MESSAGE (OUTPATIENT)
Dept: INTERNAL MEDICINE | Facility: CLINIC | Age: 40
End: 2020-04-07

## 2020-04-07 ENCOUNTER — OFFICE VISIT (OUTPATIENT)
Dept: INTERNAL MEDICINE | Facility: CLINIC | Age: 40
End: 2020-04-07
Payer: COMMERCIAL

## 2020-04-07 DIAGNOSIS — R05.9 COUGH: Primary | ICD-10-CM

## 2020-04-07 DIAGNOSIS — R05.9 COUGH: ICD-10-CM

## 2020-04-07 DIAGNOSIS — M54.9 BACK PAIN, UNSPECIFIED BACK LOCATION, UNSPECIFIED BACK PAIN LATERALITY, UNSPECIFIED CHRONICITY: ICD-10-CM

## 2020-04-07 DIAGNOSIS — U07.1 COVID-19 VIRUS INFECTION: Primary | ICD-10-CM

## 2020-04-07 PROCEDURE — 99999 PR PBB SHADOW E&M-EST. PATIENT-LVL I: CPT | Mod: PBBFAC,,,

## 2020-04-07 PROCEDURE — 99214 PR OFFICE/OUTPT VISIT, EST, LEVL IV, 30-39 MIN: ICD-10-PCS | Mod: 95,,, | Performed by: INTERNAL MEDICINE

## 2020-04-07 PROCEDURE — U0002 COVID-19 LAB TEST NON-CDC: HCPCS

## 2020-04-07 PROCEDURE — 99214 OFFICE O/P EST MOD 30 MIN: CPT | Mod: 95,,, | Performed by: INTERNAL MEDICINE

## 2020-04-07 PROCEDURE — 99999 PR PBB SHADOW E&M-EST. PATIENT-LVL I: ICD-10-PCS | Mod: PBBFAC,,,

## 2020-04-07 RX ORDER — CYCLOBENZAPRINE HCL 10 MG
10 TABLET ORAL 3 TIMES DAILY PRN
Qty: 30 TABLET | Refills: 1 | Status: SHIPPED | OUTPATIENT
Start: 2020-04-07 | End: 2020-04-17

## 2020-04-07 NOTE — PROGRESS NOTES
Telemedicine Video Visit    The patient location is:  Patient Home   The chief complaint leading to consultation is: Back pain  Visit type: Virtual visit with synchronous audio and video  Total time spent with patient: 25  Each patient to whom he or she provides medical services by telemedicine is:  (1) informed of the relationship between the physician and patient and the respective role of any other health care provider with respect to management of the patient; and (2) notified that he or she may decline to receive medical services by telemedicine and may withdraw from such care at any time.     3 days of back pain, lower back and also radiates up to waist and down legs.  R > L.   No injury.  No fall or lifting.  Some walking but nothing high impact.  Has tried tylenol, no help.  Tried methocarbamol and no help.      Cough, some allergy sx.  Also bad headache.  Scratchy throat.  Sense of taste off.  Some nausea. Some loose stools.  Some chills last night.  Low grade fver up to 100 last night.    [place]    Hygienic measures and quarantine measures reviewed at length    Patient Active Problem List   Diagnosis    Fibromyalgia    Adjustment disorder with mixed anxiety and depressed mood    Seasonal allergic rhinitis due to pollen    Obesity (BMI 30.0-34.9)    Varicose veins of left lower extremity    Intrauterine device    Chronic deep vein thrombosis (DVT) of popliteal vein of left lower extremity     Review of Systems   Constitutional: Positive for chills, fever and malaise/fatigue.   Respiratory: Positive for cough. Negative for shortness of breath.    Cardiovascular: Negative for chest pain.   Gastrointestinal: Positive for diarrhea and nausea.   Musculoskeletal: Positive for back pain and myalgias.     Physical Exam   Constitutional: She appears well-developed and well-nourished. No distress.   Appears tired but breathing normally, able to talk in complete sentences   HENT:   Head: Normocephalic  and atraumatic.   Eyes: EOM are normal.   Pulmonary/Chest: Effort normal. No respiratory distress.   Neurological: She is alert. No cranial nerve deficit.   Psychiatric: She has a normal mood and affect. Her behavior is normal. Judgment and thought content normal.       Diagnoses and all orders for this visit:    Cough  -     SARS- CoV-2 (COVID-19) QUALITATIVE PCR; Future    Back pain, unspecified back location, unspecified back pain laterality, unspecified chronicity  -     cyclobenzaprine (FLEXERIL) 10 MG tablet; Take 1 tablet (10 mg total) by mouth 3 (three) times daily as needed for Muscle spasms.    Cautions reviewed  Quarantine, mask, hygienic measures discussed  Back pain recommendations: low dose tylenol, muscle relaxant, ice a/w heat.  Alarm sx reviewed.  No trauma or re flags  I will review all studies and determine further tx depending on findings

## 2020-04-07 NOTE — PATIENT INSTRUCTIONS
Instructions for Patients Awaiting COVID-19 Test Results    You will either be called with your test result or it will be released to the patient portal.  If you have any questions about your test, please visit [email or web address] or call our COVID-19 information line at [phone].    Prevention steps for patients with confirmed or suspected COVID-19       Stay home and stay away from family members and friends. The CDC says, you can leave home after these three things have happened: 1) You have had no fever for at least 72 hours (that is three full days of no fever without the use of medicine that reduces fevers) 2) AND other symptoms have improved (for example, when your cough or shortness of breath have improved) 3) AND at least 7 days have passed since your symptoms first appeared.   Separate yourself from other people and animals in your home.   Call ahead before visiting your doctor.   Wear a facemask.   Cover your coughs and sneezes.   Wash your hands often with soap and water; hand  can be used, too.   Avoid sharing personal household items.   Wipe down surfaces used daily.   Monitor your symptoms. Seek prompt medical attention if your illness is worsening (e.g., difficulty breathing).    Before seeking care, call your healthcare provider.   If you have a medical emergency and need to call 911, notify the dispatch personnel that you have, or are being evaluated for COVID-19. If possible, put on a facemask before emergency medical services arrive.        Recommended precautions for household members, intimate partners, and caregivers in a home setting of a patient with symptomatic laboratory-confirmed COVID-19 or a patient under investigation.  Household members, intimate partners, and caregivers in the home setting awaiting tests results have close contact with a person with symptomatic, laboratory-confirmed COVID-19 or a person under investigation. Close contacts should  monitor their health; they should call their provider right away if they develop symptoms suggestive of COVID-19 (e.g., fever, cough, shortness of breath).    Close contacts should also follow these recommendations:   Make sure that you understand and can help the patient follow their provider's instructions for medication(s) and care. You should help the patient with basic needs in the home and provide support for getting groceries, prescriptions, and other personal needs.   Monitor the patient's symptoms. If the patient is getting sicker, call his or her healthcare provider and tell them that the patient has laboratory-confirmed COVID-19. If the patient has a medical emergency and you need to call 911, notify the dispatch personnel that the patient has, or is being evaluated for COVID-19.   Household members should stay in another room or be  from the patient. Household members should use a separate bedroom and bathroom, if available.   Prohibit visitors.   Household members should care for any pets in the home.   Make sure that shared spaces in the home have good air flow, such as by an air conditioner or an opened window, weather permitting.   Perform hand hygiene frequently. Wash your hands often with soap and water for at least 20 seconds or use an alcohol-based hand  (that contains > 60% alcohol) covering all surfaces of your hands and rubbing them together until they feel dry. Soap and water should be used preferentially.   Avoid touching your eyes, nose, and mouth.   The patient should wear a facemask. If the patient is not able to wear a facemask (for example, because it causes trouble breathing), caregivers should wear a mask when they are in the same room as the patient.   Wear a disposable facemask and gloves when you touch or have contact with the patient's blood, stool, or body fluids, such as saliva, sputum, nasal mucus, vomit, urine.  o Throw out disposable facemasks and  gloves after using them. Do not reuse.  o When removing personal protective equipment, first remove and dispose of gloves. Then, immediately clean your hands with soap and water or alcohol-based hand . Next, remove and dispose of facemask, and immediately clean your hands again with soap and water or alcohol-based hand .   You should not share dishes, drinking glasses, cups, eating utensils, towels, bedding, or other items with the patient. After the patient uses these items, you should wash them thoroughly (see below Wash laundry thoroughly).   Clean all high-touch surfaces, such as counters, tabletops, doorknobs, bathroom fixtures, toilets, phones, keyboards, tablets, and bedside tables, every day. Also, clean any surfaces that may have blood, stool, or body fluids on them.   Use a household cleaning spray or wipe, according to the label instructions. Labels contain instructions for safe and effective use of the cleaning product including precautions you should take when applying the product, such as wearing gloves and making sure you have good ventilation during use of the product.   Wash laundry thoroughly.  o Immediately remove and wash clothes or bedding that have blood, stool, or body fluids on them.  o Wear disposable gloves while handling soiled items and keep soiled items away from your body. Clean your hands (with soap and water or an alcohol-based hand ) immediately after removing your gloves.  o Read and follow directions on labels of laundry or clothing items and detergent. In general, using a normal laundry detergent according to washing machine instructions and dry thoroughly using the warmest temperatures recommended on the clothing label.   Place all used disposable gloves, facemasks, and other contaminated items in a lined container before disposing of them with other household waste. Clean your hands (with soap and water or an alcohol-based hand )  immediately after handling these items. Soap and water should be used preferentially if hands are visibly dirty.   Discuss any additional questions with your state or local health department or healthcare provider. Check available hours when contacting your local health department.    For more information see CDC link below.      https://www.cdc.gov/coronavirus/2019-ncov/hcp/guidance-prevent-spread.html#precautions        Sources:  [facility], [place] Department of Health and [facility]

## 2020-04-08 ENCOUNTER — PATIENT MESSAGE (OUTPATIENT)
Dept: INTERNAL MEDICINE | Facility: CLINIC | Age: 40
End: 2020-04-08

## 2020-04-08 PROBLEM — U07.1 COVID-19 VIRUS INFECTION: Status: ACTIVE | Noted: 2020-04-08

## 2020-04-08 LAB — SARS-COV-2 RNA RESP QL NAA+PROBE: DETECTED

## 2020-04-08 NOTE — TELEPHONE ENCOUNTER
I advised pt at this time we are not retesting. When would she be able to return to normal activities?

## 2020-04-09 ENCOUNTER — NURSE TRIAGE (OUTPATIENT)
Dept: ADMINISTRATIVE | Facility: CLINIC | Age: 40
End: 2020-04-09

## 2020-04-09 ENCOUNTER — PATIENT MESSAGE (OUTPATIENT)
Dept: INTERNAL MEDICINE | Facility: CLINIC | Age: 40
End: 2020-04-09

## 2020-04-09 NOTE — TELEPHONE ENCOUNTER
Antonia Jeffers is a 40 y.o. female  being contacted as part of the COVID tracker team to follow-up.    Pt is still complaining of headache and backpain since last night.   Cough still new     COVID symptoms began 5 days ago   Tested for COVID 2 days ago    Pt continues to have the following symptoms. They are not improving; symptoms more or less consistent; headache is worsening    Fever? (F) No  Cough? Yes  SOB? No  Diarrhea? Yes last night; first episode  Body aches? No  Congestion/Rhinorrhea? Yes; both   Sore throat? No  Fatigue? Yes  Anosmia? No  Headaches? Yes sinus headaches    Describe Appetite and Hydration? Recommended    OTC: Zyzal for allergies, flexeril for backpain, Excedrin for headache     Anybody else living in the house hold experiencing symptoms? No     Past Medical History:   Diagnosis Date    Abnormal Pap smear 2000    ABN pap ~17 yo s/p laser cervix and since then all pap has been normal seen only Dr. Luna since    Acute deep vein thrombosis (DVT) of femoral vein of left lower extremity 6/14/2018    GERD (gastroesophageal reflux disease)     Seasonal allergic rhinitis due to pollen 5/18/2017       Counseled that at least 3 days (72 hours) since recovery defined as resolution of fever without the use of fever reducing medications and improvement in respiratory symptoms (e.g., cough, shortness of breath); AND,  At least 7 days have passed since symptoms first appeared.    Individuals with laboratory-confirmed COVID-19 who have not had any symptoms may discontinue home isolation when at least 7 days have passed since the date of their first positive COVID-19 diagnostic test and have had no subsequent illness.    Counseled that stay at home care involves restricting activities outside your home, except for getting medical care. Do not go to work, school, or public areas. Avoid using public transportation, ride-sharing, or taxis.    If feeling warm or recent fever, advised to measure oral  temperature 2x/day or PRN measurement  Advised to maintain adequate hydration with water/electrolytes  Counseled to call back if symptoms worsen or further desire to seek testing.   Counseled about COVID symptoms, precautions and transmission.   Counseled about COVID testing guidelines.    Counseled about washing hands with warm water & soap, covering cough and cleaning surfaces.  Counseled about [facility] to setup an appt for virtual MD visit to seek Rx & advice.    The above note was completed by 4th year medical student, Michael Ga.       Reason for Disposition   [1] COVID-19 infection diagnosed or suspected AND [2] mild symptoms (fever, cough) AND [3] no trouble breathing or other complications    Protocols used: CORONAVIRUS (COVID-19) DIAGNOSED OR [id number]

## 2020-04-10 ENCOUNTER — NURSE TRIAGE (OUTPATIENT)
Dept: ADMINISTRATIVE | Facility: CLINIC | Age: 40
End: 2020-04-10

## 2020-04-10 NOTE — TELEPHONE ENCOUNTER
Pt c/o of soreness to R eye after nap yesterday. All covid sx stable.     Reason for Disposition   COVID-19, questions about    Additional Information   Negative: SEVERE difficulty breathing (e.g., struggling for each breath, speaks in single words)   Negative: Difficult to awaken or acting confused (e.g., disoriented, slurred speech)   Negative: Bluish (or gray) lips or face now   Negative: Shock suspected (e.g., cold/pale/clammy skin, too weak to stand, low BP, rapid pulse)   Negative: Sounds like a life-threatening emergency to the triager   Negative: [1] COVID-19 suspected (e.g., cough, fever, shortness of breath) AND [2] public health department recommends testing   Negative: [1] COVID-19 exposure AND [2] no symptoms   Negative: COVID-19 and Breastfeeding, questions about   Negative: SEVERE or constant chest pain (Exception: mild central chest pain, present only when coughing)   Negative: MODERATE difficulty breathing (e.g., speaks in phrases, SOB even at rest, pulse 100-120)   Negative: Patient sounds very sick or weak to the triager   Negative: MILD difficulty breathing (e.g., minimal/no SOB at rest, SOB with walking, pulse <100)   Negative: Chest pain   Negative: Fever > 103 F (39.4 C)   Negative: [1] Fever > 101 F (38.3 C) AND [2] age > 60   Negative: [1] Fever > 100.0 F (37.8 C) AND [2] bedridden (e.g., nursing home patient, CVA, chronic illness, recovering from surgery)   Negative: HIGH RISK patient (e.g., age > 64 years, diabetes, heart or lung disease, weak immune system)   Negative: Fever present > 3 days (72 hours)   Negative: [1] Fever returns after gone for over 24 hours AND [2] symptoms worse or not improved   Negative: [1] Continuous (nonstop) coughing interferes with work or school AND [2] no improvement using cough treatment per protocol   Negative: Cough present > 3 weeks   Negative: 1] COVID-19 infection diagnosed or suspected AND [2] mild symptoms (fever, cough) AND [2]  no trouble breathing or other complications    Protocols used: CORONAVIRUS (COVID-19) DIAGNOSED OR [id number]

## 2020-04-11 ENCOUNTER — NURSE TRIAGE (OUTPATIENT)
Dept: ADMINISTRATIVE | Facility: CLINIC | Age: 40
End: 2020-04-11

## 2020-04-11 ENCOUNTER — PATIENT MESSAGE (OUTPATIENT)
Dept: INTERNAL MEDICINE | Facility: CLINIC | Age: 40
End: 2020-04-11

## 2020-04-11 RX ORDER — AZITHROMYCIN 250 MG/1
TABLET, FILM COATED ORAL
Qty: 6 TABLET | Refills: 0 | Status: SHIPPED | OUTPATIENT
Start: 2020-04-11 | End: 2020-05-05 | Stop reason: SDUPTHER

## 2020-04-11 NOTE — TELEPHONE ENCOUNTER
Pt c/o of productive cough. Reviewed cough care advice per protocol. Pt concerned it is a sinus infection since she has had many in the past. Pt requesting prescription for possible sinus infection be sent to her pharmacy.     Reason for Disposition   1] COVID-19 infection diagnosed or suspected AND [2] mild symptoms (fever, cough) AND [2] no trouble breathing or other complications    Additional Information   Negative: SEVERE or constant chest pain (Exception: mild central chest pain, present only when coughing)   Negative: MODERATE difficulty breathing (e.g., speaks in phrases, SOB even at rest, pulse 100-120)   Negative: SEVERE difficulty breathing (e.g., struggling for each breath, speaks in single words)   Negative: Difficult to awaken or acting confused (e.g., disoriented, slurred speech)   Negative: Bluish (or gray) lips or face now   Negative: Shock suspected (e.g., cold/pale/clammy skin, too weak to stand, low BP, rapid pulse)   Negative: Sounds like a life-threatening emergency to the triager   Negative: Patient sounds very sick or weak to the triager   Negative: MILD difficulty breathing (e.g., minimal/no SOB at rest, SOB with walking, pulse <100)   Negative: Chest pain   Negative: Fever > 103 F (39.4 C)   Negative: [1] Fever > 101 F (38.3 C) AND [2] age > 60   Negative: [1] Fever > 100.0 F (37.8 C) AND [2] bedridden (e.g., nursing home patient, CVA, chronic illness, recovering from surgery)   Negative: HIGH RISK patient (e.g., age > 64 years, diabetes, heart or lung disease, weak immune system)   Negative: Fever present > 3 days (72 hours)   Negative: [1] Fever returns after gone for over 24 hours AND [2] symptoms worse or not improved   Negative: [1] Continuous (nonstop) coughing interferes with work or school AND [2] no improvement using cough treatment per protocol   Negative: Cough present > 3 weeks    Protocols used: CORONAVIRUS (COVID-19) DIAGNOSED OR [id number]

## 2020-04-11 NOTE — TELEPHONE ENCOUNTER
Okay, antibiotics sent.  She does have COVID which is probably the reason for her cough that the Z-Tyrell should help with sinus as well as with COVID related infection.  Follow-up poor results.  Thank you

## 2020-04-15 ENCOUNTER — NURSE TRIAGE (OUTPATIENT)
Dept: ADMINISTRATIVE | Facility: CLINIC | Age: 40
End: 2020-04-15

## 2020-04-15 ENCOUNTER — PATIENT MESSAGE (OUTPATIENT)
Dept: INTERNAL MEDICINE | Facility: CLINIC | Age: 40
End: 2020-04-15

## 2020-04-15 NOTE — TELEPHONE ENCOUNTER
Patient is feeling much better. Does still have a scratchy throat and lower back pain from laying in bed. Has questions on retesting and stopping isolations. CDC guidelines with ultimate call coming from PCP. Unsure of status or requirement for retesting, told patient to discuss with PCP. Encourage to get up and go outside but to wear her mask and practice social distancing. Patient states understanding and agrees with disposition.    Reason for Disposition   COVID-19, questions about    Additional Information   Negative: SEVERE difficulty breathing (e.g., struggling for each breath, speaks in single words)   Negative: Difficult to awaken or acting confused (e.g., disoriented, slurred speech)   Negative: Bluish (or gray) lips or face now   Negative: Shock suspected (e.g., cold/pale/clammy skin, too weak to stand, low BP, rapid pulse)   Negative: Sounds like a life-threatening emergency to the triager   Negative: [1] COVID-19 suspected (e.g., cough, fever, shortness of breath) AND [2] public health department recommends testing   Negative: [1] COVID-19 exposure AND [2] no symptoms   Negative: COVID-19 and Breastfeeding, questions about   Negative: SEVERE or constant chest pain (Exception: mild central chest pain, present only when coughing)   Negative: MODERATE difficulty breathing (e.g., speaks in phrases, SOB even at rest, pulse 100-120)   Negative: MILD difficulty breathing (e.g., minimal/no SOB at rest, SOB with walking, pulse <100)   Negative: Chest pain   Negative: Patient sounds very sick or weak to the triager   Negative: Fever > 103 F (39.4 C)   Negative: [1] Fever > 101 F (38.3 C) AND [2] age > 60   Negative: [1] Fever > 100.0 F (37.8 C) AND [2] bedridden (e.g., nursing home patient, CVA, chronic illness, recovering from surgery)   Negative: HIGH RISK patient (e.g., age > 64 years, diabetes, heart or lung disease, weak immune system)   Negative: Fever present > 3 days (72 hours)    Negative: [1] Fever returns after gone for over 24 hours AND [2] symptoms worse or not improved   Negative: [1] Continuous (nonstop) coughing interferes with work or school AND [2] no improvement using cough treatment per protocol   Negative: Cough present > 3 weeks    Protocols used: CORONAVIRUS (COVID-19) DIAGNOSED OR [id number]

## 2020-04-17 ENCOUNTER — NURSE TRIAGE (OUTPATIENT)
Dept: ADMINISTRATIVE | Facility: CLINIC | Age: 40
End: 2020-04-17

## 2020-04-17 ENCOUNTER — PATIENT MESSAGE (OUTPATIENT)
Dept: INTERNAL MEDICINE | Facility: CLINIC | Age: 40
End: 2020-04-17

## 2020-04-17 DIAGNOSIS — U07.1 COVID-19 VIRUS INFECTION: Primary | ICD-10-CM

## 2020-04-17 DIAGNOSIS — R05.9 COUGH: ICD-10-CM

## 2020-04-17 RX ORDER — ALBUTEROL SULFATE 90 UG/1
2 AEROSOL, METERED RESPIRATORY (INHALATION) EVERY 6 HOURS PRN
Qty: 18 G | Refills: 0 | Status: SHIPPED | OUTPATIENT
Start: 2020-04-17 | End: 2020-05-05 | Stop reason: SDUPTHER

## 2020-04-17 NOTE — TELEPHONE ENCOUNTER
Cough persisting but improving  14 days since symptoms started  Pt called ot inquire about when she is allowed to stop home isolation, I advised her that at least 72 hours of no cough or any other symptoms     Reason for Disposition   [1] COVID-19 infection diagnosed or suspected AND [2] mild symptoms (fever, cough) AND [3] no trouble breathing or other complications    Protocols used: CORONAVIRUS (COVID-19) DIAGNOSED OR [id number]

## 2020-04-27 ENCOUNTER — PATIENT MESSAGE (OUTPATIENT)
Dept: INTERNAL MEDICINE | Facility: CLINIC | Age: 40
End: 2020-04-27

## 2020-05-05 ENCOUNTER — PATIENT MESSAGE (OUTPATIENT)
Dept: INTERNAL MEDICINE | Facility: CLINIC | Age: 40
End: 2020-05-05

## 2020-05-05 DIAGNOSIS — U07.1 COVID-19 VIRUS INFECTION: ICD-10-CM

## 2020-05-05 DIAGNOSIS — R05.9 COUGH: Primary | ICD-10-CM

## 2020-05-05 DIAGNOSIS — R05.9 COUGH: ICD-10-CM

## 2020-05-05 RX ORDER — AZITHROMYCIN 250 MG/1
TABLET, FILM COATED ORAL
Qty: 6 TABLET | Refills: 0 | Status: SHIPPED | OUTPATIENT
Start: 2020-05-05 | End: 2021-04-21

## 2020-05-05 RX ORDER — ALBUTEROL SULFATE 90 UG/1
2 AEROSOL, METERED RESPIRATORY (INHALATION) EVERY 6 HOURS PRN
Qty: 18 G | Refills: 0 | Status: SHIPPED | OUTPATIENT
Start: 2020-05-05 | End: 2021-05-04

## 2020-05-06 ENCOUNTER — OFFICE VISIT (OUTPATIENT)
Dept: INTERNAL MEDICINE | Facility: CLINIC | Age: 40
End: 2020-05-06
Payer: COMMERCIAL

## 2020-05-06 ENCOUNTER — TELEPHONE (OUTPATIENT)
Dept: INTERNAL MEDICINE | Facility: CLINIC | Age: 40
End: 2020-05-06

## 2020-05-06 ENCOUNTER — HOSPITAL ENCOUNTER (OUTPATIENT)
Dept: RADIOLOGY | Facility: HOSPITAL | Age: 40
Discharge: HOME | End: 2020-05-06
Attending: INTERNAL MEDICINE
Payer: COMMERCIAL

## 2020-05-06 VITALS
HEART RATE: 89 BPM | WEIGHT: 240.75 LBS | TEMPERATURE: 98 F | DIASTOLIC BLOOD PRESSURE: 80 MMHG | HEIGHT: 68 IN | SYSTOLIC BLOOD PRESSURE: 122 MMHG | BODY MASS INDEX: 36.49 KG/M2 | OXYGEN SATURATION: 98 %

## 2020-05-06 DIAGNOSIS — R05.9 COUGH: ICD-10-CM

## 2020-05-06 DIAGNOSIS — Z86.16 HISTORY OF 2019 NOVEL CORONAVIRUS DISEASE (COVID-19): Primary | ICD-10-CM

## 2020-05-06 PROCEDURE — 99213 PR OFFICE/OUTPT VISIT, EST, LEVL III, 20-29 MIN: ICD-10-PCS | Mod: S$GLB,,, | Performed by: INTERNAL MEDICINE

## 2020-05-06 PROCEDURE — 71046 X-RAY EXAM CHEST 2 VIEWS: CPT | Mod: 26,,, | Performed by: RADIOLOGY

## 2020-05-06 PROCEDURE — 99213 OFFICE O/P EST LOW 20 MIN: CPT | Mod: S$GLB,,, | Performed by: INTERNAL MEDICINE

## 2020-05-06 PROCEDURE — 71046 XR CHEST PA AND LATERAL: ICD-10-PCS | Mod: 26,,, | Performed by: RADIOLOGY

## 2020-05-06 PROCEDURE — 71046 X-RAY EXAM CHEST 2 VIEWS: CPT | Mod: TC

## 2020-05-06 PROCEDURE — 99999 PR PBB SHADOW E&M-EST. PATIENT-LVL III: CPT | Mod: PBBFAC,,, | Performed by: INTERNAL MEDICINE

## 2020-05-06 PROCEDURE — 99999 PR PBB SHADOW E&M-EST. PATIENT-LVL III: ICD-10-PCS | Mod: PBBFAC,,, | Performed by: INTERNAL MEDICINE

## 2020-05-06 RX ORDER — LEVOFLOXACIN 500 MG/1
500 TABLET, FILM COATED ORAL DAILY
Qty: 10 TABLET | Refills: 0 | Status: SHIPPED | OUTPATIENT
Start: 2020-05-06 | End: 2020-05-16

## 2020-05-06 NOTE — PROGRESS NOTES
Subjective:       Patient ID: Antonia Jeffers is a 40 y.o. female.    Chief Complaint: Follow-up    40 year old lady tested positive for Covid 19 on 4/7.  Started a z-pack on 4/11 and seemed to get better.  Then a few days ago, cough returned productive of scant yellow and occasionally green mucus.  No fever, no SOB.  Is successfully using robitussin DM for cough    Review of Systems   Constitutional: Negative for activity change, chills, fatigue and fever.   HENT: Negative for congestion, ear pain, nosebleeds, postnasal drip, sinus pressure and sore throat.    Eyes: Negative.  Negative for visual disturbance.   Respiratory: Negative for cough, chest tightness, shortness of breath and wheezing.    Cardiovascular: Negative for chest pain.   Gastrointestinal: Negative for abdominal pain, diarrhea, nausea and vomiting.   Genitourinary: Negative for difficulty urinating, dysuria, frequency and urgency.   Musculoskeletal: Negative for arthralgias and neck stiffness.   Skin: Negative for rash.   Neurological: Negative for dizziness, weakness and headaches.   Psychiatric/Behavioral: Negative for sleep disturbance. The patient is not nervous/anxious.        Objective:      Physical Exam   Constitutional: She is oriented to person, place, and time. She appears well-developed and well-nourished.  Non-toxic appearance. No distress.   HENT:   Head: Normocephalic and atraumatic.   Right Ear: Tympanic membrane, external ear and ear canal normal.   Left Ear: Tympanic membrane, external ear and ear canal normal.   Eyes: Pupils are equal, round, and reactive to light. EOM are normal. No scleral icterus.   Neck: Normal range of motion. Neck supple. No thyromegaly present.   Cardiovascular: Normal rate, regular rhythm and normal heart sounds.   Pulmonary/Chest: Effort normal and breath sounds normal.   Abdominal: Soft. Bowel sounds are normal. She exhibits no mass. There is no tenderness. There is no rebound.   Musculoskeletal:  Normal range of motion.   Lymphadenopathy:     She has no cervical adenopathy.   Neurological: She is alert and oriented to person, place, and time. She has normal reflexes. She displays normal reflexes. No cranial nerve deficit. She exhibits normal muscle tone. Coordination normal.   Skin: Skin is warm and dry.   Psychiatric: She has a normal mood and affect. Her behavior is normal.       Assessment:       1. History of 2019 novel coronavirus disease (COVID-19)        Plan:   Antonia was seen today for follow-up.    Diagnoses and all orders for this visit:    History of 2019 novel coronavirus disease (COVID-19)  -     COVID-19 (SARS CoV-2) IgG Antibody; Future    Other orders  -     levoFLOXacin (LEVAQUIN) 500 MG tablet; Take 1 tablet (500 mg total) by mouth once daily. for 10 days

## 2020-05-06 NOTE — TELEPHONE ENCOUNTER
Thanks for letting me know, OK to initiate PA, it is for COVID    Does she want an xray or appt too?

## 2020-05-06 NOTE — TELEPHONE ENCOUNTER
Spoke to pharmacy---Was advised that this is the second Z-Tyrell in one month. Insurance is requiring a PA.     Becca   Member# [id number]

## 2020-05-06 NOTE — TELEPHONE ENCOUNTER
----- Message from Cecilio Rush sent at 5/6/2020 11:02 AM CDT -----  Contact: self    Patient is returning a phone call.  Who left a message for the patient: Emma Chapman LPN  Does patient know what this is regarding:  appointment  Comments:

## 2020-05-06 NOTE — TELEPHONE ENCOUNTER
Please contact her to ask if she would like to do a virtual visit or a real visit.    Another option is that we can get a chest x-ray for her which I do recommend I have ordered, please schedule.  Thank you

## 2020-05-06 NOTE — TELEPHONE ENCOUNTER
Spoke to pt---xray is scheduled for today at 4p. Pt willing to do whatever Dr. Matute feel is best regarding appt. Please advise.

## 2020-05-06 NOTE — TELEPHONE ENCOUNTER
----- Message from Janeth Rush sent at 5/6/2020  9:35 AM CDT -----  Contact: Darcie Khalil   Darcie state she need to speak to the nurse about the patient Rx azithromycin (Z-FRANCISCO) 250 MG tablet. Please call and advise.

## 2020-05-07 ENCOUNTER — PATIENT MESSAGE (OUTPATIENT)
Dept: INTERNAL MEDICINE | Facility: CLINIC | Age: 40
End: 2020-05-07

## 2020-05-14 ENCOUNTER — PATIENT MESSAGE (OUTPATIENT)
Dept: INTERNAL MEDICINE | Facility: CLINIC | Age: 40
End: 2020-05-14

## 2020-05-15 NOTE — TELEPHONE ENCOUNTER
If she is feeling better, no need for any appointment at this time    If she has concerns we can have her schedule a video visit thanks

## 2020-05-26 ENCOUNTER — PATIENT MESSAGE (OUTPATIENT)
Dept: INTERNAL MEDICINE | Facility: CLINIC | Age: 40
End: 2020-05-26

## 2020-05-26 DIAGNOSIS — F43.9 SITUATIONAL STRESS: Primary | ICD-10-CM

## 2020-05-26 RX ORDER — BUSPIRONE HYDROCHLORIDE 10 MG/1
10 TABLET ORAL 3 TIMES DAILY
Qty: 90 TABLET | Refills: 2 | Status: SHIPPED | OUTPATIENT
Start: 2020-05-26 | End: 2021-04-30

## 2020-05-27 ENCOUNTER — TELEPHONE (OUTPATIENT)
Dept: BEHAVIORAL HEALTH | Facility: CLINIC | Age: 40
End: 2020-05-27

## 2020-05-28 ENCOUNTER — TELEPHONE (OUTPATIENT)
Dept: BEHAVIORAL HEALTH | Facility: CLINIC | Age: 40
End: 2020-05-28

## 2020-06-04 ENCOUNTER — TELEPHONE (OUTPATIENT)
Dept: BEHAVIORAL HEALTH | Facility: CLINIC | Age: 40
End: 2020-06-04

## 2020-06-10 ENCOUNTER — TELEPHONE (OUTPATIENT)
Dept: INTERNAL MEDICINE | Facility: CLINIC | Age: 40
End: 2020-06-10

## 2020-06-10 ENCOUNTER — TELEPHONE (OUTPATIENT)
Dept: BEHAVIORAL HEALTH | Facility: CLINIC | Age: 40
End: 2020-06-10

## 2020-06-10 NOTE — PROGRESS NOTES
Patient was referred to the [facility] Non Opioid program by PCP.  CHW tired to reach out to patient a total of three times.  Patient referral was removed from the [facility] program.  CHW sent follow up message to patient's PCP via [employer].

## 2020-06-10 NOTE — TELEPHONE ENCOUNTER
----- Message from Shelton Junior sent at 6/10/2020  2:51 PM CDT -----  Patient was referred to the [facility] Non Opioid program by PCP.  CHW tired to reach out to patient a total of three times.  Patient referral was removed from the [facility] program

## 2020-06-23 ENCOUNTER — PATIENT MESSAGE (OUTPATIENT)
Dept: INTERNAL MEDICINE | Facility: CLINIC | Age: 40
End: 2020-06-23

## 2020-06-24 ENCOUNTER — OFFICE VISIT (OUTPATIENT)
Dept: INTERNAL MEDICINE | Facility: CLINIC | Age: 40
End: 2020-06-24
Payer: COMMERCIAL

## 2020-06-24 DIAGNOSIS — M77.12 LATERAL EPICONDYLITIS OF LEFT ELBOW: Primary | ICD-10-CM

## 2020-06-24 PROCEDURE — 99212 PR OFFICE/OUTPT VISIT, EST, LEVL II, 10-19 MIN: ICD-10-PCS | Mod: 95,,, | Performed by: NURSE PRACTITIONER

## 2020-06-24 PROCEDURE — 99212 OFFICE O/P EST SF 10 MIN: CPT | Mod: 95,,, | Performed by: NURSE PRACTITIONER

## 2020-06-24 RX ORDER — DICLOFENAC SODIUM 75 MG/1
75 TABLET, DELAYED RELEASE ORAL 2 TIMES DAILY
Qty: 30 TABLET | Refills: 0 | Status: SHIPPED | OUTPATIENT
Start: 2020-06-24 | End: 2020-08-06 | Stop reason: SDUPTHER

## 2020-06-24 NOTE — PROGRESS NOTES
INTERNAL MEDICINE URGENT CARE NOTE    CHIEF COMPLAINT     Chief Complaint   Patient presents with    Arm Pain       HPI     Antonia Jeffers is a 40 y.o. female with FM, AR, and h/o covid who presents for an urgent visit today.    Reports 3 week h/o arm pain progressed over the past week. 4 days unable to lift purse. Pain to the left arm from elbow to forearm.   Pain improved with use of strap to the left forearm.     Past Medical History:  Past Medical History:   Diagnosis Date    Abnormal Pap smear 2000    ABN pap ~19 yo s/p laser cervix and since then all pap has been normal seen only Dr. Luna since    Acute deep vein thrombosis (DVT) of femoral vein of left lower extremity 6/14/2018    GERD (gastroesophageal reflux disease)     Seasonal allergic rhinitis due to pollen 5/18/2017       Home Medications:  Prior to Admission medications    Medication Sig Start Date End Date Taking? Authorizing Provider   albuterol (PROVENTIL HFA) 90 mcg/actuation inhaler Inhale 2 puffs into the lungs every 6 (six) hours as needed for Wheezing. Rescue 5/5/20 5/5/21  Cherry Matute MD   azithromycin (Z-FRANCISCO) 250 MG tablet Take 2 tablets by mouth on day 1; Take 1 tablet by mouth on days 2-5  Patient not taking: Reported on 5/6/2020 5/5/20   Cherry Matute MD   busPIRone (BUSPAR) 10 MG tablet Take 1 tablet (10 mg total) by mouth 3 (three) times daily. 5/26/20 5/26/21  Cherry Matute MD   cholecalciferol, vitamin D3, (VITAMIN D3) 1,000 unit capsule Take 2 capsules (2,000 Units total) by mouth once daily. 2/8/19   Cherry Matute MD   levocetirizine (XYZAL) 5 MG tablet Take 1 tablet (5 mg total) by mouth every evening. 5/18/17 6/6/19  Cherry Matute MD   levonorgestrel (MIRENA) 20 mcg/24 hr (5 years) IUD 1 each by Intrauterine route once.    Historical Provider, MD       Review of Systems:  Review of Systems   Constitutional: Negative for activity change and unexpected weight change.   HENT: Negative for hearing loss,  rhinorrhea and trouble swallowing.    Eyes: Negative for discharge and visual disturbance.   Respiratory: Negative for chest tightness and wheezing.    Cardiovascular: Negative for chest pain and palpitations.   Gastrointestinal: Negative for blood in stool, constipation, diarrhea and vomiting.   Endocrine: Negative for polydipsia and polyuria.   Genitourinary: Negative for difficulty urinating, dysuria, hematuria and menstrual problem.   Musculoskeletal: Positive for arthralgias and joint swelling. Negative for neck pain.   Neurological: Negative for weakness and headaches.   Psychiatric/Behavioral: Negative for confusion and dysphoric mood.       Health Maintainence:   Immunizations:  Health Maintenance       Date Due Completion Date    Mammogram 08/01/2020 8/1/2019    Influenza Vaccine (Season Ended) 09/01/2020 2/7/2019    Cervical Cancer Screening 05/01/2021 5/1/2018    Override on 6/13/2012: Done (Outside MD)    TETANUS VACCINE 10/11/2028 10/11/2018           PHYSICAL EXAM     There were no vitals taken for this visit.    Physical Exam  Constitutional:       Appearance: Normal appearance.   HENT:      Head: Normocephalic.   Pulmonary:      Effort: No respiratory distress.   Musculoskeletal:      Left elbow: She exhibits normal range of motion, no swelling, no effusion, no deformity and no laceration. Tenderness found. Lateral epicondyle tenderness noted.        Arms:    Neurological:      Mental Status: She is alert and oriented to person, place, and time.   Psychiatric:         Mood and Affect: Mood normal.         LABS     Lab Results   Component Value Date    HGBA1C 5.1 02/07/2019     CMP  Sodium   Date Value Ref Range Status   02/07/2019 140 136 - 145 mmol/L Final     Potassium   Date Value Ref Range Status   02/07/2019 4.0 3.5 - 5.1 mmol/L Final     Chloride   Date Value Ref Range Status   02/07/2019 107 95 - 110 mmol/L Final     CO2   Date Value Ref Range Status   02/07/2019 25 23 - 29 mmol/L Final      Glucose   Date Value Ref Range Status   02/07/2019 86 70 - 110 mg/dL Final     BUN, Bld   Date Value Ref Range Status   02/07/2019 19 6 - 20 mg/dL Final     Creatinine   Date Value Ref Range Status   02/07/2019 0.9 0.5 - 1.4 mg/dL Final     Calcium   Date Value Ref Range Status   02/07/2019 10.5 8.7 - 10.5 mg/dL Final     Total Protein   Date Value Ref Range Status   02/07/2019 7.9 6.0 - 8.4 g/dL Final     Albumin   Date Value Ref Range Status   02/07/2019 4.7 3.5 - 5.2 g/dL Final     Total Bilirubin   Date Value Ref Range Status   02/07/2019 0.6 0.1 - 1.0 mg/dL Final     Comment:     For infants and newborns, interpretation of results should be based  on gestational age, weight and in agreement with clinical  observations.  Premature Infant recommended reference ranges:  Up to 24 hours.............<8.0 mg/dL  Up to 48 hours............<12.0 mg/dL  3-5 days..................<15.0 mg/dL  6-29 days.................<15.0 mg/dL       Alkaline Phosphatase   Date Value Ref Range Status   02/07/2019 54 (L) 55 - 135 U/L Final     AST   Date Value Ref Range Status   02/07/2019 21 10 - 40 U/L Final     ALT   Date Value Ref Range Status   02/07/2019 22 10 - 44 U/L Final     Anion Gap   Date Value Ref Range Status   02/07/2019 8 8 - 16 mmol/L Final     eGFR if    Date Value Ref Range Status   02/07/2019 >60 >60 mL/min/1.73 m^2 Final     eGFR if non    Date Value Ref Range Status   02/07/2019 >60 >60 mL/min/1.73 m^2 Final     Comment:     Calculation used to obtain the estimated glomerular filtration  rate (eGFR) is the CKD-EPI equation.        Lab Results   Component Value Date    WBC 7.18 02/07/2019    HGB 14.1 02/07/2019    HCT 43.0 02/07/2019    MCV 89 02/07/2019     02/07/2019     Lab Results   Component Value Date    CHOL 194 05/18/2017    CHOL 174 06/06/2014    CHOL 164 05/24/2013     Lab Results   Component Value Date    HDL 72 05/18/2017    HDL 64 06/06/2014    HDL 55  05/24/2013     Lab Results   Component Value Date    LDLCALC 107.0 05/18/2017    LDLCALC 93.2 06/06/2014    LDLCALC 95.0 05/24/2013     Lab Results   Component Value Date    TRIG 75 05/18/2017    TRIG 84 06/06/2014    TRIG 69 05/24/2013     Lab Results   Component Value Date    CHOLHDL 37.1 05/18/2017    CHOLHDL 36.8 06/06/2014    CHOLHDL 33.5 05/24/2013     Lab Results   Component Value Date    TSH 1.194 02/07/2019       ASSESSMENT/PLAN     Antonia Jeffers is a 40 y.o. female       Lateral epicondylitis of left elbow- will cont strap to the left forearm. Ice. Diclofenac. If not improved with conservative measure will refer to ortho and PT   -     diclofenac (VOLTAREN) 75 MG EC tablet; Take 1 tablet (75 mg total) by mouth 2 (two) times daily.  Dispense: 30 tablet; Refill: 0        Follow up with PCP    Patient education provided from [facility]. Patient was counseled on when and how to seek emergent care.       Rosanna CHRISTOPHER, APRN, FNP-c   Department of Internal Medicine - [facility]  10:04 AM

## 2020-07-31 ENCOUNTER — PATIENT MESSAGE (OUTPATIENT)
Dept: INTERNAL MEDICINE | Facility: CLINIC | Age: 40
End: 2020-07-31

## 2020-07-31 DIAGNOSIS — M77.10 LATERAL EPICONDYLITIS, UNSPECIFIED LATERALITY: Primary | ICD-10-CM

## 2020-08-06 ENCOUNTER — HOSPITAL ENCOUNTER (OUTPATIENT)
Dept: RADIOLOGY | Facility: HOSPITAL | Age: 40
Discharge: HOME | End: 2020-08-06
Attending: PHYSICIAN ASSISTANT
Payer: COMMERCIAL

## 2020-08-06 ENCOUNTER — OFFICE VISIT (OUTPATIENT)
Dept: ORTHOPEDICS | Facility: CLINIC | Age: 40
End: 2020-08-06
Payer: COMMERCIAL

## 2020-08-06 VITALS — WEIGHT: 248 LBS | HEIGHT: 68 IN | BODY MASS INDEX: 37.59 KG/M2

## 2020-08-06 DIAGNOSIS — S46.209A INJURY OF TENDON OF BICEPS: ICD-10-CM

## 2020-08-06 DIAGNOSIS — M25.522 LEFT ELBOW PAIN: Primary | ICD-10-CM

## 2020-08-06 DIAGNOSIS — M25.522 LEFT ELBOW PAIN: ICD-10-CM

## 2020-08-06 PROCEDURE — 99203 OFFICE O/P NEW LOW 30 MIN: CPT | Mod: S$GLB,,, | Performed by: PHYSICIAN ASSISTANT

## 2020-08-06 PROCEDURE — 99999 PR PBB SHADOW E&M-EST. PATIENT-LVL IV: CPT | Mod: PBBFAC,,, | Performed by: PHYSICIAN ASSISTANT

## 2020-08-06 PROCEDURE — 99999 PR PBB SHADOW E&M-EST. PATIENT-LVL IV: ICD-10-PCS | Mod: PBBFAC,,, | Performed by: PHYSICIAN ASSISTANT

## 2020-08-06 PROCEDURE — 99203 PR OFFICE/OUTPT VISIT, NEW, LEVL III, 30-44 MIN: ICD-10-PCS | Mod: S$GLB,,, | Performed by: PHYSICIAN ASSISTANT

## 2020-08-06 PROCEDURE — 73080 X-RAY EXAM OF ELBOW: CPT | Mod: TC,LT

## 2020-08-06 PROCEDURE — 73080 X-RAY EXAM OF ELBOW: CPT | Mod: 26,LT,, | Performed by: RADIOLOGY

## 2020-08-06 PROCEDURE — 73080 XR ELBOW COMPLETE 3 VIEW LEFT: ICD-10-PCS | Mod: 26,LT,, | Performed by: RADIOLOGY

## 2020-08-06 RX ORDER — DICLOFENAC SODIUM 75 MG/1
75 TABLET, DELAYED RELEASE ORAL 2 TIMES DAILY
Qty: 30 TABLET | Refills: 0 | Status: SHIPPED | OUTPATIENT
Start: 2020-08-06 | End: 2021-04-30

## 2020-08-06 NOTE — LETTER
August 6, 2020      Cherry Matute MD  [address]  [facility] [address]           [facility]  [address]  [facility] [address]  Phone: [phone]          Patient: Antonia Jeffers   MR Number: [id number]   YOB: 1980   Date of Visit: 8/6/2020       Dear Dr. Cherry Matute:    Thank you for referring Antonia Jeffers to me for evaluation. Attached you will find relevant portions of my assessment and plan of care.    If you have questions, please do not hesitate to call me. I look forward to following Antonia Jeffers along with you.    Sincerely,    Nadege Wilcox PA-C    Enclosure  CC:  No Recipients    If you would like to receive this communication electronically, please contact [email or web address] or [phone] to request more information on [employer] Link access.    For providers and/or their staff who would like to refer a patient to [facility], please contact us through our one-stop-shop provider referral line, [facility] , at [phone].    If you feel you have received this communication in error or would no longer like to receive these types of communications, please e-mail [email or web address]

## 2020-08-06 NOTE — PROGRESS NOTES
Subjective:      Patient ID: Antonia Jeffers is a 40 y.o. female.    Chief Complaint: Pain and Swelling of the Left Elbow    HPI  40 year old female presents with chief complaint of left elbow pain x several months. She is RHD and denies trauma. Pain is worse with lifting and extending the elbow. She took diclofenac which helped while she was taking it but pain returned once she finished the prescription. She reports swelling. She denies N/T.   Review of Systems   Constitution: Negative for chills, fever and night sweats.   Cardiovascular: Negative for chest pain.   Respiratory: Negative for cough and shortness of breath.    Hematologic/Lymphatic: Does not bruise/bleed easily.   Skin: Negative for color change.   Gastrointestinal: Negative for heartburn.   Genitourinary: Negative for dysuria.   Neurological: Negative for numbness and paresthesias.   Psychiatric/Behavioral: Negative for altered mental status.   Allergic/Immunologic: Negative for persistent infections.         Objective:            General    Vitals reviewed.  Constitutional: She is oriented to person, place, and time. She appears well-developed and well-nourished.   Cardiovascular: Normal rate.    Neurological: She is alert and oriented to person, place, and time.         Left Elbow Exam     Tenderness   The patient is tender to palpation of the lateral epicondyle and medial epicondyle.     Range of Motion   The patient has normal left elbow ROM.    Other   Sensation: normal    Comments:  Also TTP distal bicep tendon. Her strength is very limited compared to the unaffected side.         Muscle Strength   Left Upper Extremity  :  4/5/5   Elbow Extension: 3/5  Elbow Flexion: 3/5        X-ray: ordered and reviewed by myself. No fracture dislocation bone destruction seen.  No joint effusion seen.      Assessment:       Encounter Diagnoses   Name Primary?    Injury of tendon of biceps     Left elbow pain Yes          Plan:       MRI was ordered to  r/o distal biceps tendon tear due to her weakness. Diclofenac refilled. RTC pending results.

## 2020-08-10 ENCOUNTER — HOSPITAL ENCOUNTER (OUTPATIENT)
Dept: RADIOLOGY | Facility: HOSPITAL | Age: 40
Discharge: HOME | End: 2020-08-10
Attending: PHYSICIAN ASSISTANT
Payer: COMMERCIAL

## 2020-08-10 DIAGNOSIS — S46.209A INJURY OF TENDON OF BICEPS: ICD-10-CM

## 2020-08-10 DIAGNOSIS — M25.522 LEFT ELBOW PAIN: ICD-10-CM

## 2020-08-10 PROCEDURE — 73221 MRI ELBOW WITHOUT CONTRAST LEFT: ICD-10-PCS | Mod: 26,LT,, | Performed by: RADIOLOGY

## 2020-08-10 PROCEDURE — 73221 MRI JOINT UPR EXTREM W/O DYE: CPT | Mod: 26,LT,, | Performed by: RADIOLOGY

## 2020-08-10 PROCEDURE — 73221 MRI JOINT UPR EXTREM W/O DYE: CPT | Mod: TC,LT

## 2020-08-11 ENCOUNTER — TELEPHONE (OUTPATIENT)
Dept: ORTHOPEDICS | Facility: CLINIC | Age: 40
End: 2020-08-11

## 2020-08-11 DIAGNOSIS — S46.209A INJURY OF TENDON OF BICEPS: ICD-10-CM

## 2020-08-11 DIAGNOSIS — M25.522 LEFT ELBOW PAIN: Primary | ICD-10-CM

## 2020-08-11 NOTE — TELEPHONE ENCOUNTER
Spoke to patient regarding MRI results. Will order PT. If PT causes her increased pain, patient will d/c PT and let us know so we can have her see one of the sports medicine surgeons. She voiced understanding.

## 2020-09-29 ENCOUNTER — TELEPHONE (OUTPATIENT)
Dept: OBSTETRICS AND GYNECOLOGY | Facility: CLINIC | Age: 40
End: 2020-09-29

## 2020-09-29 ENCOUNTER — PATIENT MESSAGE (OUTPATIENT)
Dept: OBSTETRICS AND GYNECOLOGY | Facility: CLINIC | Age: 40
End: 2020-09-29

## 2020-09-29 DIAGNOSIS — Z12.31 VISIT FOR SCREENING MAMMOGRAM: Primary | ICD-10-CM

## 2020-10-03 ENCOUNTER — HOSPITAL ENCOUNTER (OUTPATIENT)
Dept: RADIOLOGY | Facility: HOSPITAL | Age: 40
Discharge: HOME | End: 2020-10-03
Attending: OBSTETRICS & GYNECOLOGY
Payer: COMMERCIAL

## 2020-10-03 DIAGNOSIS — Z12.31 VISIT FOR SCREENING MAMMOGRAM: ICD-10-CM

## 2020-10-05 ENCOUNTER — PATIENT OUTREACH (OUTPATIENT)
Dept: ADMINISTRATIVE | Facility: OTHER | Age: 40
End: 2020-10-05

## 2020-10-05 ENCOUNTER — PATIENT MESSAGE (OUTPATIENT)
Dept: OBSTETRICS AND GYNECOLOGY | Facility: CLINIC | Age: 40
End: 2020-10-05

## 2020-10-05 NOTE — PROGRESS NOTES
Care Everywhere:   Immunization: updated  Health Maintenance: updated  Media Review:   Legacy Review:   Order placed:   Upcoming appts:  Needs diagnostic mammogram for left breast per notes on cancellation of mammogram on 10/3/2020

## 2020-10-06 ENCOUNTER — OFFICE VISIT (OUTPATIENT)
Dept: OBSTETRICS AND GYNECOLOGY | Facility: CLINIC | Age: 40
End: 2020-10-06
Payer: COMMERCIAL

## 2020-10-06 VITALS
HEIGHT: 68 IN | DIASTOLIC BLOOD PRESSURE: 70 MMHG | WEIGHT: 237.19 LBS | SYSTOLIC BLOOD PRESSURE: 114 MMHG | BODY MASS INDEX: 35.95 KG/M2

## 2020-10-06 DIAGNOSIS — N63.20 LEFT BREAST MASS: ICD-10-CM

## 2020-10-06 DIAGNOSIS — Z01.419 VISIT FOR GYNECOLOGIC EXAMINATION: Primary | ICD-10-CM

## 2020-10-06 DIAGNOSIS — Z12.39 ENCOUNTER FOR SCREENING FOR MALIGNANT NEOPLASM OF BREAST, UNSPECIFIED SCREENING MODALITY: ICD-10-CM

## 2020-10-06 PROCEDURE — 99396 PREV VISIT EST AGE 40-64: CPT | Mod: S$GLB,,, | Performed by: OBSTETRICS & GYNECOLOGY

## 2020-10-06 PROCEDURE — 99999 PR PBB SHADOW E&M-EST. PATIENT-LVL III: CPT | Mod: PBBFAC,,, | Performed by: OBSTETRICS & GYNECOLOGY

## 2020-10-06 PROCEDURE — 99999 PR PBB SHADOW E&M-EST. PATIENT-LVL III: ICD-10-PCS | Mod: PBBFAC,,, | Performed by: OBSTETRICS & GYNECOLOGY

## 2020-10-06 PROCEDURE — 99396 PR PREVENTIVE VISIT,EST,40-64: ICD-10-PCS | Mod: S$GLB,,, | Performed by: OBSTETRICS & GYNECOLOGY

## 2020-10-06 NOTE — PROGRESS NOTES
HISTORY OF PRESENT ILLNESS:    Antonia Jeffers is a 40 y.o. female, , Patient's last menstrual period was 2018.,  presents for a routine exam and ?L BREAST MASS.  COTEST PLANNED .  IUD CHECK - NO MENSES AND NO C/O.  REF DIAG L AND SCREENING R MAMMO    LAST  FOLLOWING IUD AND PRIOR 2018:  PAP AND NECON REFILL.  REF MAMMO.  NO C/O  SURG [facility]   LAST VISIT 2017:   PAP DUE 2018.  REFILL OCP BUT WILL CHANGE TO NECON FOR BETTER CYCLE CONTROL - HAS HAD SOME BTB ON CURRENT PILL OVER THE PAST 6-7 MO  LAST VISIT 2016:   PAP DUE 2018 AND REFILL OCP - MONONESSA.  NO SIGNIF GYN ISSUES AT THIS TIME.  SWITCHING LAUNDRY DETERG AND FEELS IMPROVED FROM LAST YEAR.  TO [place] AND ANNIVERSARY TRIP IN November TO [place] . .   LAST VISIT 2015:  SEVERAL ISSUES:  IN PAST HAD OV CYST, THINKS RECURRENT DUE TO TWINGES ON LEFT SIDE - IN PAST BEATRIS HAD DX FUNCTIONAL CYSTS AND THIS FEELS SIMILAR. WILL F/U WITH PELVIC USG. DISCUSSED LOW-DOSE OCP SUPPRESS MOST BUT NOT ALL OVARIAN ACTIVITY; USUALLY DOES WELL WITH CURRENT OCP AND WILL NOT CHANGE, BUT IF RECURRENT EPISODES IN THE FUTURE MIGHT CONSIDER A STRONGER PILL  ABD PAIN, 2 EPISODES, 4 WEEKS AGO AND LAST WEEK, LASTED ABOUT 10 MIN, MIDLINE. HAS HX FIBROMYALGIA AND HARD TO TEASE APART WHAT THAT MIGHT BE CONTRIBUTING, BUT WILL CHECK PELVIC USG AND ADVIS CONT OCP  FEELS LIGHTHEADED ASSOCIATED WITH SEVERE PAIN EPISODES - DISCUSSED    IRREG MENSES PAST 2 MONTHS. LMP 5/5, 3-4 DAYS WITH 2 HEAVY. 4/EARLY ALSO HAD MENSES. HAD MIDCYCLE SPOTTING OVER THE PAST 2 MONTHS    URINE CULTURE SUBMITTED FOR UTI SX OVER THE PAST 3 DAYS; EMPIRIC ABX MACROBID  PAP SUBMITTED, DISCUSSED SCREENING  SPOUSE WITH RECENT INFIDELITY AND WANTS STD SCREEN FOR [facility]    Past Medical History:   Diagnosis Date    Abnormal Pap smear     ABN pap ~19 yo s/p laser cervix and since then all pap has been normal seen only Dr. Luna since    Acute deep vein  thrombosis (DVT) of femoral vein of left lower extremity 2018    GERD (gastroesophageal reflux disease)     Seasonal allergic rhinitis due to pollen 2017       Past Surgical History:   Procedure Laterality Date     SECTION         MEDICATIONS AND ALLERGIES:      Current Outpatient Medications:     albuterol (PROVENTIL HFA) 90 mcg/actuation inhaler, Inhale 2 puffs into the lungs every 6 (six) hours as needed for Wheezing. Rescue, Disp: 18 g, Rfl: 0    azithromycin (Z-FRANCISCO) 250 MG tablet, Take 2 tablets by mouth on day 1; Take 1 tablet by mouth on days 2-5 (Patient not taking: Reported on 2020), Disp: 6 tablet, Rfl: 0    busPIRone (BUSPAR) 10 MG tablet, Take 1 tablet (10 mg total) by mouth 3 (three) times daily., Disp: 90 tablet, Rfl: 2    cholecalciferol, vitamin D3, (VITAMIN D3) 1,000 unit capsule, Take 2 capsules (2,000 Units total) by mouth once daily., Disp: 60 capsule, Rfl: 12    diclofenac (VOLTAREN) 75 MG EC tablet, Take 1 tablet (75 mg total) by mouth 2 (two) times daily., Disp: 30 tablet, Rfl: 0    levocetirizine (XYZAL) 5 MG tablet, Take 1 tablet (5 mg total) by mouth every evening., Disp: 30 tablet, Rfl: 11    levonorgestrel (MIRENA) 20 mcg/24 hr (5 years) IUD, 1 each by Intrauterine route once., Disp: , Rfl:     Review of patient's allergies indicates:  No Known Allergies    Family History   Problem Relation Age of Onset    Cancer Maternal Grandmother         Brain    Diabetes Maternal Grandmother     Diabetes Father     Hypertension Father     Heart disease Father         A fib    Thyroid disease Father     Hyperlipidemia Father     Lymphoma Mother 60    Diabetes Mother     Hypertension Mother     Cancer Mother         follicular lymphoma, thyroid; B lymphoma x 2    Depression Mother     Thyroid disease Mother     Heart disease Mother         A fib    Hyperlipidemia Mother     No Known Problems Sister     No Known Problems Son     Clotting disorder Sister      Thyroid disease Sister     Factor V Leiden deficiency Sister     Deep vein thrombosis Sister     Breast cancer Neg Hx     Colon cancer Neg Hx     Ovarian cancer Neg Hx        Social History     Socioeconomic History    Marital status:      Spouse name: Not on file    Number of children: 1    Years of education: Not on file    Highest education level: Not on file   Occupational History    Not on file   Social Needs    Financial resource strain: Not on file    Food insecurity     Worry: Not on file     Inability: Not on file    Transportation needs     Medical: Not on file     Non-medical: Not on file   Tobacco Use    Smoking status: Never Smoker    Smokeless tobacco: Never Used   Substance and Sexual Activity    Alcohol use: No     Comment: rarely, 2-3 drinks a week maximum    Drug use: No    Sexual activity: Yes     Partners: Male     Birth control/protection: OCP   Lifestyle    Physical activity     Days per week: Not on file     Minutes per session: Not on file    Stress: Not on file   Relationships    Social connections     Talks on phone: Not on file     Gets together: Not on file     Attends Zoroastrianism service: Not on file     Active member of club or organization: Not on file     Attends meetings of clubs or organizations: Not on file     Relationship status: Not on file   Other Topics Concern    Patient feels they ought to cut down on drinking/drug use Not Asked    Patient annoyed by others criticizing their drinking/drug use Not Asked    Patient has felt bad or guilty about drinking/drug use Not Asked    Patient has had a drink/used drugs as an eye opener in the AM Not Asked   Social History Narrative    ** Merged History Encounter **            COMPREHENSIVE GYN HISTORY:  PAP History: Denies abnormal Paps.  Infection History: Denies STDs. Denies PID.  Benign History: Denies uterine fibroids. Denies ovarian cysts. Denies endometriosis. Denies other conditions.  Cancer  "History: Denies cervical cancer. Denies uterine cancer or hyperplasia. Denies ovarian cancer. Denies vulvar cancer or pre-cancer. Denies vaginal cancer or pre-cancer. Denies breast cancer. Denies colon cancer.  Sexual Activity History: Reports currently being sexually active  Menstrual History: Monthly, mild-moderate.  Contraception:IUD    ROS:  GENERAL: No weight changes. No swelling. No fatigue. No fever.  CARDIOVASCULAR: No chest pain. No shortness of breath. No leg cramps.   NEUROLOGICAL: No headaches. No vision changes.  BREASTS: No pain. No lumps. No discharge.  ABDOMEN: No pain. No nausea. No vomiting. No diarrhea. No constipation.  REPRODUCTIVE: No abnormal bleeding.   VULVA: No pain. No lesions. No itching.  VAGINA: No relaxation. No itching. No odor. No discharge. No lesions.  URINARY: No incontinence. No nocturia. No frequency. No dysuria.    /70   Ht 5' 8" (1.727 m)   Wt 107.6 kg (237 lb 3.4 oz)   LMP 09/01/2018   BMI 36.07 kg/m²     PE:  APPEARANCE: Well nourished, well developed, in no acute distress.  AFFECT: WNL, alert and oriented x 3.  SKIN: No acne or hirsutism.  NECK: Neck symmetric, without masses or thyromegaly.  NODES: No inguinal, cervical, axillary or femoral lymph node enlargement.  CHEST: Good respiratory effort.   ABDOMEN: Soft. No tenderness or masses. No hepatosplenomegaly. No hernias.  BREASTS: Symmetrical, no skin changes, visible lesions, palpable masses or nipple discharge bilaterally.  PELVIC: External female genitalia without lesions.  Female hair distribution. Adequate perineal body, Normal urethral meatus. Vagina moist and well rugated without lesions or discharge.  No significant cystocele or rectocele present. Cervix pink without lesions, discharge or tenderness, STRING EXTERNAL OS. Uterus is normal size, regular, mobile and nontender. Adnexa without masses or tenderness.  EXTREMITIES: No edema    PROCEDURES:    DIAGNOSIS:  1. Left breast mass  Mammo Digital " Diagnostic Left with Leon   2. Encounter for screening for malignant neoplasm of breast, unspecified screening modality  Mammo Digital Screening Right with Leon       LABS AND TESTS ORDERED:    MEDICATIONS PRESCRIBED:    COUNSELING:   The patient was counseled today on ACS PAP guidelines, with recommendations for yearly pelvic exams unless their uterus, cervix, and ovaries were removed for benign reasons; in that case, examinations every 3-5 years are recommended.  The patient was also counseled regarding monthly breast self-examination, routine STD screening for at-risk populations, prophylactic immunizations for transmitted infections such as  HPV, Pertussis, or Influenza as appropriate, and yearly mammograms when indicated by ACS guidelines.  She was advised to see her primary care physician for all other health maintenance.    FOLLOW-UP with me annually.

## 2020-10-14 ENCOUNTER — HOSPITAL ENCOUNTER (OUTPATIENT)
Dept: RADIOLOGY | Facility: HOSPITAL | Age: 40
Discharge: HOME | End: 2020-10-14
Attending: OBSTETRICS & GYNECOLOGY
Payer: COMMERCIAL

## 2020-10-14 VITALS — WEIGHT: 234 LBS | HEIGHT: 68 IN | BODY MASS INDEX: 35.46 KG/M2

## 2020-10-14 DIAGNOSIS — N63.20 LEFT BREAST MASS: ICD-10-CM

## 2020-10-14 PROCEDURE — 76642 ULTRASOUND BREAST LIMITED: CPT | Mod: TC,LT

## 2020-10-14 PROCEDURE — 77066 DX MAMMO INCL CAD BI: CPT | Mod: TC

## 2020-10-14 PROCEDURE — 76642 US BREAST LEFT LIMITED: ICD-10-PCS | Mod: 26,LT,, | Performed by: RADIOLOGY

## 2020-10-14 PROCEDURE — 77062 MAMMO DIGITAL DIAGNOSTIC BILAT WITH TOMO: ICD-10-PCS | Mod: 26,,, | Performed by: RADIOLOGY

## 2020-10-14 PROCEDURE — 77062 BREAST TOMOSYNTHESIS BI: CPT | Mod: 26,,, | Performed by: RADIOLOGY

## 2020-10-14 PROCEDURE — 77066 DX MAMMO INCL CAD BI: CPT | Mod: 26,,, | Performed by: RADIOLOGY

## 2020-10-14 PROCEDURE — 77066 MAMMO DIGITAL DIAGNOSTIC BILAT WITH TOMO: ICD-10-PCS | Mod: 26,,, | Performed by: RADIOLOGY

## 2020-10-14 PROCEDURE — 76642 ULTRASOUND BREAST LIMITED: CPT | Mod: 26,LT,, | Performed by: RADIOLOGY

## 2020-11-30 ENCOUNTER — PATIENT MESSAGE (OUTPATIENT)
Dept: INTERNAL MEDICINE | Facility: CLINIC | Age: 40
End: 2020-11-30

## 2020-12-01 NOTE — TELEPHONE ENCOUNTER
Please let her know that as of right now, we do not think that she is at risk for reinfection given that she had COVID in April.  She should still have antibodies.  There is nothing specific that she needs to do but I would recommend that she wear a mask, do whatever she can in terms of social distancing and hand washing.  If she becomes ill, she should let us know but at this point we are not recommending retesting.

## 2020-12-18 ENCOUNTER — PATIENT MESSAGE (OUTPATIENT)
Dept: OBSTETRICS AND GYNECOLOGY | Facility: CLINIC | Age: 40
End: 2020-12-18

## 2021-04-21 ENCOUNTER — LAB VISIT (OUTPATIENT)
Dept: LAB | Facility: HOSPITAL | Age: 41
End: 2021-04-21
Attending: INTERNAL MEDICINE
Payer: COMMERCIAL

## 2021-04-21 ENCOUNTER — TELEPHONE (OUTPATIENT)
Dept: INTERNAL MEDICINE | Facility: CLINIC | Age: 41
End: 2021-04-21

## 2021-04-21 ENCOUNTER — OFFICE VISIT (OUTPATIENT)
Dept: INTERNAL MEDICINE | Facility: CLINIC | Age: 41
End: 2021-04-21
Payer: COMMERCIAL

## 2021-04-21 VITALS
OXYGEN SATURATION: 99 % | WEIGHT: 241.38 LBS | HEART RATE: 91 BPM | DIASTOLIC BLOOD PRESSURE: 79 MMHG | HEIGHT: 68 IN | BODY MASS INDEX: 36.58 KG/M2 | SYSTOLIC BLOOD PRESSURE: 115 MMHG

## 2021-04-21 DIAGNOSIS — I82.532 CHRONIC DEEP VEIN THROMBOSIS (DVT) OF POPLITEAL VEIN OF LEFT LOWER EXTREMITY: ICD-10-CM

## 2021-04-21 DIAGNOSIS — Z11.59 ENCOUNTER FOR HEPATITIS C SCREENING TEST FOR LOW RISK PATIENT: ICD-10-CM

## 2021-04-21 DIAGNOSIS — E66.01 SEVERE OBESITY (BMI 35.0-35.9 WITH COMORBIDITY): ICD-10-CM

## 2021-04-21 DIAGNOSIS — E55.9 VITAMIN D DEFICIENCY DISEASE: ICD-10-CM

## 2021-04-21 DIAGNOSIS — E78.2 MIXED HYPERLIPIDEMIA: ICD-10-CM

## 2021-04-21 DIAGNOSIS — R53.83 FATIGUE, UNSPECIFIED TYPE: ICD-10-CM

## 2021-04-21 DIAGNOSIS — R05.9 COUGH: ICD-10-CM

## 2021-04-21 DIAGNOSIS — U07.1 COVID-19 VIRUS INFECTION: ICD-10-CM

## 2021-04-21 DIAGNOSIS — R06.00 DYSPNEA, UNSPECIFIED TYPE: ICD-10-CM

## 2021-04-21 DIAGNOSIS — R06.83 SNORING: ICD-10-CM

## 2021-04-21 DIAGNOSIS — Z00.00 ANNUAL PHYSICAL EXAM: Primary | ICD-10-CM

## 2021-04-21 LAB
25(OH)D3+25(OH)D2 SERPL-MCNC: 40 NG/ML (ref 30–96)
ALBUMIN SERPL BCP-MCNC: 4 G/DL (ref 3.5–5.2)
ALP SERPL-CCNC: 48 U/L (ref 55–135)
ALT SERPL W/O P-5'-P-CCNC: 21 U/L (ref 10–44)
ANION GAP SERPL CALC-SCNC: 5 MMOL/L (ref 8–16)
AST SERPL-CCNC: 18 U/L (ref 10–40)
BASOPHILS # BLD AUTO: 0.03 K/UL (ref 0–0.2)
BASOPHILS NFR BLD: 0.5 % (ref 0–1.9)
BILIRUB SERPL-MCNC: 0.4 MG/DL (ref 0.1–1)
BUN SERPL-MCNC: 13 MG/DL (ref 6–20)
CALCIUM SERPL-MCNC: 9.2 MG/DL (ref 8.7–10.5)
CHLORIDE SERPL-SCNC: 106 MMOL/L (ref 95–110)
CHOLEST SERPL-MCNC: 176 MG/DL (ref 120–199)
CHOLEST/HDLC SERPL: 2.7 {RATIO} (ref 2–5)
CO2 SERPL-SCNC: 28 MMOL/L (ref 23–29)
CREAT SERPL-MCNC: 0.8 MG/DL (ref 0.5–1.4)
DIFFERENTIAL METHOD: NORMAL
EOSINOPHIL # BLD AUTO: 0.1 K/UL (ref 0–0.5)
EOSINOPHIL NFR BLD: 1.4 % (ref 0–8)
ERYTHROCYTE [DISTWIDTH] IN BLOOD BY AUTOMATED COUNT: 13.2 % (ref 11.5–14.5)
EST. GFR  (AFRICAN AMERICAN): >60 ML/MIN/1.73 M^2
EST. GFR  (NON AFRICAN AMERICAN): >60 ML/MIN/1.73 M^2
GLUCOSE SERPL-MCNC: 71 MG/DL (ref 70–110)
HCT VFR BLD AUTO: 40.1 % (ref 37–48.5)
HCV AB SERPL QL IA: NEGATIVE
HDLC SERPL-MCNC: 65 MG/DL (ref 40–75)
HDLC SERPL: 36.9 % (ref 20–50)
HGB BLD-MCNC: 13.3 G/DL (ref 12–16)
IMM GRANULOCYTES # BLD AUTO: 0.02 K/UL (ref 0–0.04)
IMM GRANULOCYTES NFR BLD AUTO: 0.3 % (ref 0–0.5)
LDLC SERPL CALC-MCNC: 91.2 MG/DL (ref 63–159)
LYMPHOCYTES # BLD AUTO: 2.4 K/UL (ref 1–4.8)
LYMPHOCYTES NFR BLD: 39.9 % (ref 18–48)
MCH RBC QN AUTO: 29.9 PG (ref 27–31)
MCHC RBC AUTO-ENTMCNC: 33.2 G/DL (ref 32–36)
MCV RBC AUTO: 90 FL (ref 82–98)
MONOCYTES # BLD AUTO: 0.5 K/UL (ref 0.3–1)
MONOCYTES NFR BLD: 8.4 % (ref 4–15)
NEUTROPHILS # BLD AUTO: 2.9 K/UL (ref 1.8–7.7)
NEUTROPHILS NFR BLD: 49.5 % (ref 38–73)
NONHDLC SERPL-MCNC: 111 MG/DL
NRBC BLD-RTO: 0 /100 WBC
PLATELET # BLD AUTO: 293 K/UL (ref 150–450)
PMV BLD AUTO: 10.3 FL (ref 9.2–12.9)
POTASSIUM SERPL-SCNC: 4.1 MMOL/L (ref 3.5–5.1)
PROT SERPL-MCNC: 7.3 G/DL (ref 6–8.4)
RBC # BLD AUTO: 4.45 M/UL (ref 4–5.4)
SODIUM SERPL-SCNC: 139 MMOL/L (ref 136–145)
TRIGL SERPL-MCNC: 99 MG/DL (ref 30–150)
TSH SERPL DL<=0.005 MIU/L-ACNC: 1.44 UIU/ML (ref 0.4–4)
WBC # BLD AUTO: 5.92 K/UL (ref 3.9–12.7)

## 2021-04-21 PROCEDURE — 99396 PREV VISIT EST AGE 40-64: CPT | Mod: S$GLB,,, | Performed by: INTERNAL MEDICINE

## 2021-04-21 PROCEDURE — 99999 PR PBB SHADOW E&M-EST. PATIENT-LVL III: ICD-10-PCS | Mod: PBBFAC,,, | Performed by: INTERNAL MEDICINE

## 2021-04-21 PROCEDURE — 36415 COLL VENOUS BLD VENIPUNCTURE: CPT | Performed by: INTERNAL MEDICINE

## 2021-04-21 PROCEDURE — 99999 PR PBB SHADOW E&M-EST. PATIENT-LVL III: CPT | Mod: PBBFAC,,, | Performed by: INTERNAL MEDICINE

## 2021-04-21 PROCEDURE — 80061 LIPID PANEL: CPT | Performed by: INTERNAL MEDICINE

## 2021-04-21 PROCEDURE — 84443 ASSAY THYROID STIM HORMONE: CPT | Performed by: INTERNAL MEDICINE

## 2021-04-21 PROCEDURE — 80053 COMPREHEN METABOLIC PANEL: CPT | Performed by: INTERNAL MEDICINE

## 2021-04-21 PROCEDURE — 86803 HEPATITIS C AB TEST: CPT | Performed by: INTERNAL MEDICINE

## 2021-04-21 PROCEDURE — 85025 COMPLETE CBC W/AUTO DIFF WBC: CPT | Performed by: INTERNAL MEDICINE

## 2021-04-21 PROCEDURE — 99396 PR PREVENTIVE VISIT,EST,40-64: ICD-10-PCS | Mod: S$GLB,,, | Performed by: INTERNAL MEDICINE

## 2021-04-21 PROCEDURE — 82306 VITAMIN D 25 HYDROXY: CPT | Performed by: INTERNAL MEDICINE

## 2021-04-22 ENCOUNTER — PATIENT MESSAGE (OUTPATIENT)
Dept: INTERNAL MEDICINE | Facility: CLINIC | Age: 41
End: 2021-04-22

## 2021-04-23 ENCOUNTER — PATIENT MESSAGE (OUTPATIENT)
Dept: INTERNAL MEDICINE | Facility: CLINIC | Age: 41
End: 2021-04-23

## 2021-04-23 ENCOUNTER — HOSPITAL ENCOUNTER (OUTPATIENT)
Dept: RADIOLOGY | Facility: HOSPITAL | Age: 41
Discharge: HOME | End: 2021-04-23
Attending: INTERNAL MEDICINE
Payer: COMMERCIAL

## 2021-04-23 DIAGNOSIS — K80.20 GALLSTONES: Primary | ICD-10-CM

## 2021-04-23 PROCEDURE — 71250 CT CHEST WITHOUT CONTRAST: ICD-10-PCS | Mod: 26,,, | Performed by: RADIOLOGY

## 2021-04-23 PROCEDURE — 71250 CT THORAX DX C-: CPT | Mod: TC

## 2021-04-23 PROCEDURE — 71250 CT THORAX DX C-: CPT | Mod: 26,,, | Performed by: RADIOLOGY

## 2021-04-26 ENCOUNTER — OFFICE VISIT (OUTPATIENT)
Dept: SURGERY | Facility: CLINIC | Age: 41
End: 2021-04-26
Payer: COMMERCIAL

## 2021-04-26 ENCOUNTER — LAB VISIT (OUTPATIENT)
Dept: INTERNAL MEDICINE | Facility: CLINIC | Age: 41
End: 2021-04-26
Payer: COMMERCIAL

## 2021-04-26 VITALS
BODY MASS INDEX: 36.9 KG/M2 | SYSTOLIC BLOOD PRESSURE: 132 MMHG | DIASTOLIC BLOOD PRESSURE: 72 MMHG | HEIGHT: 68 IN | RESPIRATION RATE: 18 BRPM | OXYGEN SATURATION: 100 % | HEART RATE: 62 BPM | WEIGHT: 243.5 LBS

## 2021-04-26 DIAGNOSIS — K80.20 GALLSTONES: ICD-10-CM

## 2021-04-26 DIAGNOSIS — R05.9 COUGH: ICD-10-CM

## 2021-04-26 PROCEDURE — 99999 PR PBB SHADOW E&M-EST. PATIENT-LVL V: CPT | Mod: PBBFAC,,, | Performed by: SURGERY

## 2021-04-26 PROCEDURE — 99999 PR PBB SHADOW E&M-EST. PATIENT-LVL V: ICD-10-PCS | Mod: PBBFAC,,, | Performed by: SURGERY

## 2021-04-26 PROCEDURE — U0005 INFEC AGEN DETEC AMPLI PROBE: HCPCS | Performed by: INTERNAL MEDICINE

## 2021-04-26 PROCEDURE — 99203 PR OFFICE/OUTPT VISIT, NEW, LEVL III, 30-44 MIN: ICD-10-PCS | Mod: S$GLB,,, | Performed by: SURGERY

## 2021-04-26 PROCEDURE — U0003 INFECTIOUS AGENT DETECTION BY NUCLEIC ACID (DNA OR RNA); SEVERE ACUTE RESPIRATORY SYNDROME CORONAVIRUS 2 (SARS-COV-2) (CORONAVIRUS DISEASE [COVID-19]), AMPLIFIED PROBE TECHNIQUE, MAKING USE OF HIGH THROUGHPUT TECHNOLOGIES AS DESCRIBED BY CMS-2020-01-R: HCPCS | Performed by: INTERNAL MEDICINE

## 2021-04-26 PROCEDURE — 99203 OFFICE O/P NEW LOW 30 MIN: CPT | Mod: S$GLB,,, | Performed by: SURGERY

## 2021-04-26 RX ORDER — LORAZEPAM 2 MG/1
TABLET ORAL
COMMUNITY
Start: 2021-03-04 | End: 2021-04-30

## 2021-04-26 RX ORDER — TRAMADOL HYDROCHLORIDE 50 MG/1
TABLET ORAL
COMMUNITY
Start: 2021-03-04 | End: 2021-04-30

## 2021-04-27 ENCOUNTER — TELEPHONE (OUTPATIENT)
Dept: INTERNAL MEDICINE | Facility: CLINIC | Age: 41
End: 2021-04-27

## 2021-04-27 DIAGNOSIS — Z01.818 PREOPERATIVE CLEARANCE: ICD-10-CM

## 2021-04-27 DIAGNOSIS — I82.532 CHRONIC DEEP VEIN THROMBOSIS (DVT) OF POPLITEAL VEIN OF LEFT LOWER EXTREMITY: Primary | ICD-10-CM

## 2021-04-27 LAB — SARS-COV-2 RNA RESP QL NAA+PROBE: NOT DETECTED

## 2021-04-28 ENCOUNTER — TELEPHONE (OUTPATIENT)
Dept: HEMATOLOGY/ONCOLOGY | Facility: CLINIC | Age: 41
End: 2021-04-28

## 2021-04-28 ENCOUNTER — PATIENT MESSAGE (OUTPATIENT)
Dept: INTERNAL MEDICINE | Facility: CLINIC | Age: 41
End: 2021-04-28

## 2021-04-29 ENCOUNTER — PATIENT MESSAGE (OUTPATIENT)
Dept: INTERNAL MEDICINE | Facility: CLINIC | Age: 41
End: 2021-04-29

## 2021-04-29 ENCOUNTER — HOSPITAL ENCOUNTER (OUTPATIENT)
Dept: PULMONOLOGY | Facility: CLINIC | Age: 41
Discharge: HOME | End: 2021-04-29
Payer: COMMERCIAL

## 2021-04-29 DIAGNOSIS — R06.00 DYSPNEA, UNSPECIFIED TYPE: ICD-10-CM

## 2021-04-29 PROCEDURE — 94729 PR C02/MEMBANE DIFFUSE CAPACITY: ICD-10-PCS | Mod: S$GLB,,, | Performed by: INTERNAL MEDICINE

## 2021-04-29 PROCEDURE — 94729 DIFFUSING CAPACITY: CPT | Mod: S$GLB,,, | Performed by: INTERNAL MEDICINE

## 2021-04-29 PROCEDURE — 94060 EVALUATION OF WHEEZING: CPT | Mod: S$GLB,,, | Performed by: INTERNAL MEDICINE

## 2021-04-29 PROCEDURE — 94727 GAS DIL/WSHOT DETER LNG VOL: CPT | Mod: S$GLB,,, | Performed by: INTERNAL MEDICINE

## 2021-04-29 PROCEDURE — 94727 PR PULM FUNCTION TEST BY GAS: ICD-10-PCS | Mod: S$GLB,,, | Performed by: INTERNAL MEDICINE

## 2021-04-29 PROCEDURE — 94060 PR EVAL OF BRONCHOSPASM: ICD-10-PCS | Mod: S$GLB,,, | Performed by: INTERNAL MEDICINE

## 2021-04-30 ENCOUNTER — OFFICE VISIT (OUTPATIENT)
Dept: HEMATOLOGY/ONCOLOGY | Facility: CLINIC | Age: 41
End: 2021-04-30
Payer: COMMERCIAL

## 2021-04-30 VITALS
OXYGEN SATURATION: 98 % | SYSTOLIC BLOOD PRESSURE: 123 MMHG | TEMPERATURE: 99 F | BODY MASS INDEX: 38.69 KG/M2 | HEIGHT: 67 IN | DIASTOLIC BLOOD PRESSURE: 75 MMHG | WEIGHT: 246.5 LBS | HEART RATE: 78 BPM

## 2021-04-30 DIAGNOSIS — I82.532 CHRONIC DEEP VEIN THROMBOSIS (DVT) OF POPLITEAL VEIN OF LEFT LOWER EXTREMITY: ICD-10-CM

## 2021-04-30 DIAGNOSIS — Z01.818 PREOPERATIVE CLEARANCE: ICD-10-CM

## 2021-04-30 PROCEDURE — 99999 PR PBB SHADOW E&M-EST. PATIENT-LVL III: CPT | Mod: PBBFAC,,, | Performed by: STUDENT IN AN ORGANIZED HEALTH CARE EDUCATION/TRAINING PROGRAM

## 2021-04-30 PROCEDURE — 99215 PR OFFICE/OUTPT VISIT, EST, LEVL V, 40-54 MIN: ICD-10-PCS | Mod: S$GLB,,, | Performed by: STUDENT IN AN ORGANIZED HEALTH CARE EDUCATION/TRAINING PROGRAM

## 2021-04-30 PROCEDURE — 99215 OFFICE O/P EST HI 40 MIN: CPT | Mod: S$GLB,,, | Performed by: STUDENT IN AN ORGANIZED HEALTH CARE EDUCATION/TRAINING PROGRAM

## 2021-04-30 PROCEDURE — 99999 PR PBB SHADOW E&M-EST. PATIENT-LVL III: ICD-10-PCS | Mod: PBBFAC,,, | Performed by: STUDENT IN AN ORGANIZED HEALTH CARE EDUCATION/TRAINING PROGRAM

## 2021-05-04 ENCOUNTER — OFFICE VISIT (OUTPATIENT)
Dept: INTERNAL MEDICINE | Facility: CLINIC | Age: 41
End: 2021-05-04
Payer: COMMERCIAL

## 2021-05-04 ENCOUNTER — TELEPHONE (OUTPATIENT)
Dept: INTERNAL MEDICINE | Facility: CLINIC | Age: 41
End: 2021-05-04

## 2021-05-04 ENCOUNTER — HOSPITAL ENCOUNTER (OUTPATIENT)
Dept: SLEEP MEDICINE | Facility: HOSPITAL | Age: 41
Discharge: HOME | End: 2021-05-04
Attending: INTERNAL MEDICINE
Payer: COMMERCIAL

## 2021-05-04 VITALS
DIASTOLIC BLOOD PRESSURE: 80 MMHG | SYSTOLIC BLOOD PRESSURE: 129 MMHG | HEART RATE: 69 BPM | TEMPERATURE: 98 F | BODY MASS INDEX: 37.83 KG/M2 | WEIGHT: 241.06 LBS | HEIGHT: 67 IN

## 2021-05-04 DIAGNOSIS — Z86.16 HISTORY OF COVID-19: Primary | ICD-10-CM

## 2021-05-04 DIAGNOSIS — R06.02 EXERTIONAL SHORTNESS OF BREATH: ICD-10-CM

## 2021-05-04 DIAGNOSIS — K80.50 BILIARY COLIC: ICD-10-CM

## 2021-05-04 DIAGNOSIS — G47.33 OSA (OBSTRUCTIVE SLEEP APNEA): ICD-10-CM

## 2021-05-04 DIAGNOSIS — J30.1 SEASONAL ALLERGIC RHINITIS DUE TO POLLEN: ICD-10-CM

## 2021-05-04 DIAGNOSIS — R05.9 COUGH: ICD-10-CM

## 2021-05-04 DIAGNOSIS — R06.83 SNORING: ICD-10-CM

## 2021-05-04 PROCEDURE — 99999 PR PBB SHADOW E&M-EST. PATIENT-LVL III: ICD-10-PCS | Mod: PBBFAC,,, | Performed by: INTERNAL MEDICINE

## 2021-05-04 PROCEDURE — 99215 PR OFFICE/OUTPT VISIT, EST, LEVL V, 40-54 MIN: ICD-10-PCS | Mod: S$GLB,,, | Performed by: INTERNAL MEDICINE

## 2021-05-04 PROCEDURE — 95800 SLP STDY UNATTENDED: CPT | Mod: 26,,, | Performed by: INTERNAL MEDICINE

## 2021-05-04 PROCEDURE — 99999 PR PBB SHADOW E&M-EST. PATIENT-LVL III: CPT | Mod: PBBFAC,,, | Performed by: INTERNAL MEDICINE

## 2021-05-04 PROCEDURE — 95800 SLP STDY UNATTENDED: CPT

## 2021-05-04 PROCEDURE — 95800 PR SLEEP STUDY, UNATTENDED, RECORD HEART RATE/O2 SAT/RESP ANAL/SLEEP TIME: ICD-10-PCS | Mod: 26,,, | Performed by: INTERNAL MEDICINE

## 2021-05-04 PROCEDURE — 99215 OFFICE O/P EST HI 40 MIN: CPT | Mod: S$GLB,,, | Performed by: INTERNAL MEDICINE

## 2021-05-04 RX ORDER — AZELASTINE 1 MG/ML
1 SPRAY, METERED NASAL 2 TIMES DAILY
Qty: 30 ML | Refills: 2 | Status: SHIPPED | OUTPATIENT
Start: 2021-05-04 | End: 2021-05-26

## 2021-05-04 RX ORDER — MONTELUKAST SODIUM 10 MG/1
10 TABLET ORAL NIGHTLY
Qty: 30 TABLET | Refills: 3 | Status: SHIPPED | OUTPATIENT
Start: 2021-05-04 | End: 2021-06-03

## 2021-05-04 RX ORDER — BUDESONIDE AND FORMOTEROL FUMARATE DIHYDRATE 160; 4.5 UG/1; UG/1
2 AEROSOL RESPIRATORY (INHALATION) EVERY 12 HOURS
Qty: 1 INHALER | Refills: 0 | Status: SHIPPED | OUTPATIENT
Start: 2021-05-04 | End: 2021-05-26

## 2021-05-06 ENCOUNTER — PATIENT MESSAGE (OUTPATIENT)
Dept: INTERNAL MEDICINE | Facility: CLINIC | Age: 41
End: 2021-05-06

## 2021-05-06 RX ORDER — ONDANSETRON 4 MG/1
4 TABLET, FILM COATED ORAL EVERY 8 HOURS PRN
Qty: 12 TABLET | Refills: 0 | Status: SHIPPED | OUTPATIENT
Start: 2021-05-06 | End: 2021-08-26

## 2021-05-07 ENCOUNTER — PATIENT MESSAGE (OUTPATIENT)
Dept: INTERNAL MEDICINE | Facility: CLINIC | Age: 41
End: 2021-05-07

## 2021-05-07 ENCOUNTER — TELEPHONE (OUTPATIENT)
Dept: PULMONOLOGY | Facility: CLINIC | Age: 41
End: 2021-05-07

## 2021-05-07 ENCOUNTER — TELEPHONE (OUTPATIENT)
Dept: INTERNAL MEDICINE | Facility: CLINIC | Age: 41
End: 2021-05-07

## 2021-05-07 DIAGNOSIS — G47.33 OSA (OBSTRUCTIVE SLEEP APNEA): Primary | ICD-10-CM

## 2021-05-13 ENCOUNTER — OFFICE VISIT (OUTPATIENT)
Dept: PULMONOLOGY | Facility: CLINIC | Age: 41
End: 2021-05-13
Payer: COMMERCIAL

## 2021-05-13 VITALS
HEIGHT: 67 IN | DIASTOLIC BLOOD PRESSURE: 74 MMHG | SYSTOLIC BLOOD PRESSURE: 115 MMHG | WEIGHT: 242.38 LBS | TEMPERATURE: 97 F | HEART RATE: 69 BPM | BODY MASS INDEX: 38.04 KG/M2

## 2021-05-13 DIAGNOSIS — G47.33 OSA (OBSTRUCTIVE SLEEP APNEA): ICD-10-CM

## 2021-05-13 PROCEDURE — 99999 PR PBB SHADOW E&M-EST. PATIENT-LVL IV: ICD-10-PCS | Mod: PBBFAC,,, | Performed by: NURSE PRACTITIONER

## 2021-05-13 PROCEDURE — 99999 PR PBB SHADOW E&M-EST. PATIENT-LVL IV: CPT | Mod: PBBFAC,,, | Performed by: NURSE PRACTITIONER

## 2021-05-13 PROCEDURE — 99203 PR OFFICE/OUTPT VISIT, NEW, LEVL III, 30-44 MIN: ICD-10-PCS | Mod: S$GLB,,, | Performed by: NURSE PRACTITIONER

## 2021-05-13 PROCEDURE — 99203 OFFICE O/P NEW LOW 30 MIN: CPT | Mod: S$GLB,,, | Performed by: NURSE PRACTITIONER

## 2021-05-17 ENCOUNTER — TELEPHONE (OUTPATIENT)
Dept: SURGERY | Facility: CLINIC | Age: 41
End: 2021-05-17

## 2021-05-17 ENCOUNTER — ANESTHESIA EVENT (OUTPATIENT)
Dept: SURGERY | Facility: HOSPITAL | Age: 41
End: 2021-05-17
Payer: COMMERCIAL

## 2021-05-17 RX ORDER — ACETAMINOPHEN 500 MG
1000 TABLET ORAL
Status: ACTIVE | OUTPATIENT
Start: 2021-05-17 | End: 2021-05-18

## 2021-05-18 ENCOUNTER — HOSPITAL ENCOUNTER (OUTPATIENT)
Facility: HOSPITAL | Age: 41
Discharge: HOME | End: 2021-05-18
Attending: SURGERY | Admitting: SURGERY
Payer: COMMERCIAL

## 2021-05-18 ENCOUNTER — ANESTHESIA (OUTPATIENT)
Dept: SURGERY | Facility: HOSPITAL | Age: 41
End: 2021-05-18
Payer: COMMERCIAL

## 2021-05-18 VITALS
OXYGEN SATURATION: 96 % | TEMPERATURE: 98 F | WEIGHT: 242 LBS | BODY MASS INDEX: 37.9 KG/M2 | RESPIRATION RATE: 43 BRPM | DIASTOLIC BLOOD PRESSURE: 61 MMHG | SYSTOLIC BLOOD PRESSURE: 113 MMHG | HEART RATE: 48 BPM

## 2021-05-18 DIAGNOSIS — K80.20 CHOLELITHIASIS: ICD-10-CM

## 2021-05-18 LAB
B-HCG UR QL: NEGATIVE
CTP QC/QA: YES

## 2021-05-18 PROCEDURE — 71000015 HC POSTOP RECOV 1ST HR: Performed by: SURGERY

## 2021-05-18 PROCEDURE — D9220A PRA ANESTHESIA: ICD-10-PCS | Mod: ANES,,, | Performed by: ANESTHESIOLOGY

## 2021-05-18 PROCEDURE — 88304 TISSUE EXAM BY PATHOLOGIST: CPT | Performed by: PATHOLOGY

## 2021-05-18 PROCEDURE — 27201423 OPTIME MED/SURG SUP & DEVICES STERILE SUPPLY: Performed by: SURGERY

## 2021-05-18 PROCEDURE — D9220A PRA ANESTHESIA: Mod: CRNA,,, | Performed by: NURSE ANESTHETIST, CERTIFIED REGISTERED

## 2021-05-18 PROCEDURE — 47562 LAPAROSCOPIC CHOLECYSTECTOMY: CPT | Mod: ,,, | Performed by: SURGERY

## 2021-05-18 PROCEDURE — 88304 TISSUE EXAM BY PATHOLOGIST: CPT | Mod: 26,,, | Performed by: PATHOLOGY

## 2021-05-18 PROCEDURE — 36000708 HC OR TIME LEV III 1ST 15 MIN: Performed by: SURGERY

## 2021-05-18 PROCEDURE — 63600175 PHARM REV CODE 636 W HCPCS: Performed by: STUDENT IN AN ORGANIZED HEALTH CARE EDUCATION/TRAINING PROGRAM

## 2021-05-18 PROCEDURE — 47562 PR LAP,CHOLECYSTECTOMY: ICD-10-PCS | Mod: ,,, | Performed by: SURGERY

## 2021-05-18 PROCEDURE — 71000016 HC POSTOP RECOV ADDL HR: Performed by: SURGERY

## 2021-05-18 PROCEDURE — 25000003 PHARM REV CODE 250: Performed by: NURSE ANESTHETIST, CERTIFIED REGISTERED

## 2021-05-18 PROCEDURE — 88304 PR  SURG PATH,LEVEL III: ICD-10-PCS | Mod: 26,,, | Performed by: PATHOLOGY

## 2021-05-18 PROCEDURE — 25000003 PHARM REV CODE 250: Performed by: SURGERY

## 2021-05-18 PROCEDURE — 37000009 HC ANESTHESIA EA ADD 15 MINS: Performed by: SURGERY

## 2021-05-18 PROCEDURE — 36000709 HC OR TIME LEV III EA ADD 15 MIN: Performed by: SURGERY

## 2021-05-18 PROCEDURE — 63600175 PHARM REV CODE 636 W HCPCS: Performed by: NURSE ANESTHETIST, CERTIFIED REGISTERED

## 2021-05-18 PROCEDURE — D9220A PRA ANESTHESIA: ICD-10-PCS | Mod: CRNA,,, | Performed by: NURSE ANESTHETIST, CERTIFIED REGISTERED

## 2021-05-18 PROCEDURE — 63600175 PHARM REV CODE 636 W HCPCS: Performed by: ANESTHESIOLOGY

## 2021-05-18 PROCEDURE — 25000003 PHARM REV CODE 250: Performed by: ANESTHESIOLOGY

## 2021-05-18 PROCEDURE — 25000003 PHARM REV CODE 250: Performed by: STUDENT IN AN ORGANIZED HEALTH CARE EDUCATION/TRAINING PROGRAM

## 2021-05-18 PROCEDURE — 81025 URINE PREGNANCY TEST: CPT | Performed by: SURGERY

## 2021-05-18 PROCEDURE — 71000044 HC DOSC ROUTINE RECOVERY FIRST HOUR: Performed by: SURGERY

## 2021-05-18 PROCEDURE — D9220A PRA ANESTHESIA: Mod: ANES,,, | Performed by: ANESTHESIOLOGY

## 2021-05-18 PROCEDURE — 37000008 HC ANESTHESIA 1ST 15 MINUTES: Performed by: SURGERY

## 2021-05-18 RX ORDER — LIDOCAINE HYDROCHLORIDE 20 MG/ML
INJECTION, SOLUTION EPIDURAL; INFILTRATION; INTRACAUDAL; PERINEURAL
Status: DISCONTINUED | OUTPATIENT
Start: 2021-05-18 | End: 2021-05-18

## 2021-05-18 RX ORDER — DEXAMETHASONE SODIUM PHOSPHATE 4 MG/ML
INJECTION, SOLUTION INTRA-ARTICULAR; INTRALESIONAL; INTRAMUSCULAR; INTRAVENOUS; SOFT TISSUE
Status: DISCONTINUED | OUTPATIENT
Start: 2021-05-18 | End: 2021-05-18

## 2021-05-18 RX ORDER — ONDANSETRON 2 MG/ML
INJECTION INTRAMUSCULAR; INTRAVENOUS
Status: DISCONTINUED | OUTPATIENT
Start: 2021-05-18 | End: 2021-05-18

## 2021-05-18 RX ORDER — ONDANSETRON 2 MG/ML
4 INJECTION INTRAMUSCULAR; INTRAVENOUS DAILY PRN
Status: CANCELLED | OUTPATIENT
Start: 2021-05-18

## 2021-05-18 RX ORDER — ENOXAPARIN SODIUM 100 MG/ML
40 INJECTION SUBCUTANEOUS DAILY
Qty: 4 ML | Refills: 0 | Status: SHIPPED | OUTPATIENT
Start: 2021-05-18 | End: 2021-05-28

## 2021-05-18 RX ORDER — FENTANYL CITRATE 50 UG/ML
INJECTION, SOLUTION INTRAMUSCULAR; INTRAVENOUS
Status: DISCONTINUED | OUTPATIENT
Start: 2021-05-18 | End: 2021-05-18

## 2021-05-18 RX ORDER — ONDANSETRON 2 MG/ML
4 INJECTION INTRAMUSCULAR; INTRAVENOUS ONCE
Status: COMPLETED | OUTPATIENT
Start: 2021-05-18 | End: 2021-05-18

## 2021-05-18 RX ORDER — ACETAMINOPHEN 500 MG
1000 TABLET ORAL ONCE
Status: COMPLETED | OUTPATIENT
Start: 2021-05-18 | End: 2021-05-18

## 2021-05-18 RX ORDER — CEFAZOLIN SODIUM 1 G/3ML
2 INJECTION, POWDER, FOR SOLUTION INTRAMUSCULAR; INTRAVENOUS
Status: COMPLETED | OUTPATIENT
Start: 2021-05-18 | End: 2021-05-18

## 2021-05-18 RX ORDER — HYDROMORPHONE HYDROCHLORIDE 1 MG/ML
0.2 INJECTION, SOLUTION INTRAMUSCULAR; INTRAVENOUS; SUBCUTANEOUS EVERY 5 MIN PRN
Status: COMPLETED | OUTPATIENT
Start: 2021-05-18 | End: 2021-05-18

## 2021-05-18 RX ORDER — PROCHLORPERAZINE EDISYLATE 5 MG/ML
5 INJECTION INTRAMUSCULAR; INTRAVENOUS EVERY 30 MIN PRN
Status: CANCELLED | OUTPATIENT
Start: 2021-05-18

## 2021-05-18 RX ORDER — KETAMINE HCL IN 0.9 % NACL 50 MG/5 ML
SYRINGE (ML) INTRAVENOUS
Status: DISCONTINUED | OUTPATIENT
Start: 2021-05-18 | End: 2021-05-18

## 2021-05-18 RX ORDER — FENTANYL CITRATE 50 UG/ML
25 INJECTION, SOLUTION INTRAMUSCULAR; INTRAVENOUS EVERY 5 MIN PRN
Status: CANCELLED | OUTPATIENT
Start: 2021-05-18

## 2021-05-18 RX ORDER — DEXMEDETOMIDINE HYDROCHLORIDE 100 UG/ML
INJECTION, SOLUTION INTRAVENOUS
Status: DISCONTINUED | OUTPATIENT
Start: 2021-05-18 | End: 2021-05-18

## 2021-05-18 RX ORDER — HYDROCODONE BITARTRATE AND ACETAMINOPHEN 5; 325 MG/1; MG/1
1 TABLET ORAL EVERY 4 HOURS PRN
Qty: 15 TABLET | Refills: 0 | Status: SHIPPED | OUTPATIENT
Start: 2021-05-18 | End: 2021-08-26

## 2021-05-18 RX ORDER — SODIUM CHLORIDE 0.9 % (FLUSH) 0.9 %
10 SYRINGE (ML) INJECTION
Status: DISCONTINUED | OUTPATIENT
Start: 2021-05-18 | End: 2021-05-18 | Stop reason: HOSPADM

## 2021-05-18 RX ORDER — MIDAZOLAM HYDROCHLORIDE 1 MG/ML
INJECTION, SOLUTION INTRAMUSCULAR; INTRAVENOUS
Status: DISCONTINUED | OUTPATIENT
Start: 2021-05-18 | End: 2021-05-18

## 2021-05-18 RX ORDER — SODIUM CHLORIDE 9 MG/ML
INJECTION, SOLUTION INTRAVENOUS CONTINUOUS
Status: DISCONTINUED | OUTPATIENT
Start: 2021-05-18 | End: 2021-05-18 | Stop reason: HOSPADM

## 2021-05-18 RX ORDER — NEOSTIGMINE METHYLSULFATE 0.5 MG/ML
INJECTION, SOLUTION INTRAVENOUS
Status: DISCONTINUED | OUTPATIENT
Start: 2021-05-18 | End: 2021-05-18

## 2021-05-18 RX ORDER — BUPIVACAINE HYDROCHLORIDE 5 MG/ML
INJECTION, SOLUTION EPIDURAL; INTRACAUDAL
Status: DISCONTINUED | OUTPATIENT
Start: 2021-05-18 | End: 2021-05-18 | Stop reason: HOSPADM

## 2021-05-18 RX ORDER — HALOPERIDOL 5 MG/ML
0.5 INJECTION INTRAMUSCULAR EVERY 10 MIN PRN
Status: DISCONTINUED | OUTPATIENT
Start: 2021-05-18 | End: 2021-05-18 | Stop reason: HOSPADM

## 2021-05-18 RX ORDER — ROCURONIUM BROMIDE 10 MG/ML
INJECTION, SOLUTION INTRAVENOUS
Status: DISCONTINUED | OUTPATIENT
Start: 2021-05-18 | End: 2021-05-18

## 2021-05-18 RX ORDER — HYDROCODONE BITARTRATE AND ACETAMINOPHEN 5; 325 MG/1; MG/1
1 TABLET ORAL ONCE AS NEEDED
Status: COMPLETED | OUTPATIENT
Start: 2021-05-18 | End: 2021-05-18

## 2021-05-18 RX ADMIN — HYDROMORPHONE HYDROCHLORIDE 0.2 MG: 1 INJECTION, SOLUTION INTRAMUSCULAR; INTRAVENOUS; SUBCUTANEOUS at 08:05

## 2021-05-18 RX ADMIN — DEXMEDETOMIDINE HYDROCHLORIDE 2 MCG: 100 INJECTION, SOLUTION INTRAVENOUS at 06:05

## 2021-05-18 RX ADMIN — ACETAMINOPHEN 1000 MG: 500 TABLET ORAL at 06:05

## 2021-05-18 RX ADMIN — ROCURONIUM BROMIDE 50 MG: 10 INJECTION, SOLUTION INTRAVENOUS at 07:05

## 2021-05-18 RX ADMIN — MIDAZOLAM 2 MG: 1 INJECTION INTRAMUSCULAR; INTRAVENOUS at 06:05

## 2021-05-18 RX ADMIN — SODIUM CHLORIDE: 0.9 INJECTION, SOLUTION INTRAVENOUS at 06:05

## 2021-05-18 RX ADMIN — DEXAMETHASONE SODIUM PHOSPHATE 4 MG: 4 INJECTION INTRA-ARTICULAR; INTRALESIONAL; INTRAMUSCULAR; INTRAVENOUS; SOFT TISSUE at 07:05

## 2021-05-18 RX ADMIN — Medication 25 MG: at 07:05

## 2021-05-18 RX ADMIN — FENTANYL CITRATE 100 MCG: 50 INJECTION INTRAMUSCULAR; INTRAVENOUS at 07:05

## 2021-05-18 RX ADMIN — HYDROMORPHONE HYDROCHLORIDE 0.2 MG: 1 INJECTION, SOLUTION INTRAMUSCULAR; INTRAVENOUS; SUBCUTANEOUS at 09:05

## 2021-05-18 RX ADMIN — HYDROCODONE BITARTRATE AND ACETAMINOPHEN 1 TABLET: 5; 325 TABLET ORAL at 08:05

## 2021-05-18 RX ADMIN — DEXMEDETOMIDINE HYDROCHLORIDE 8 MCG: 100 INJECTION, SOLUTION INTRAVENOUS at 07:05

## 2021-05-18 RX ADMIN — LIDOCAINE HYDROCHLORIDE 100 MG: 20 INJECTION, SOLUTION EPIDURAL; INFILTRATION; INTRACAUDAL at 07:05

## 2021-05-18 RX ADMIN — NEOSTIGMINE METHYLSULFATE 5 MG: 0.5 INJECTION INTRAVENOUS at 07:05

## 2021-05-18 RX ADMIN — CEFAZOLIN 2 G: 330 INJECTION, POWDER, FOR SOLUTION INTRAMUSCULAR; INTRAVENOUS at 07:05

## 2021-05-18 RX ADMIN — ONDANSETRON 4 MG: 2 INJECTION INTRAMUSCULAR; INTRAVENOUS at 08:05

## 2021-05-18 RX ADMIN — ONDANSETRON 4 MG: 2 INJECTION INTRAMUSCULAR; INTRAVENOUS at 07:05

## 2021-05-18 RX ADMIN — GLYCOPYRROLATE 0.6 MG: 0.2 INJECTION, SOLUTION INTRAMUSCULAR; INTRAVITREAL at 07:05

## 2021-05-20 ENCOUNTER — PATIENT MESSAGE (OUTPATIENT)
Dept: SURGERY | Facility: CLINIC | Age: 41
End: 2021-05-20

## 2021-05-21 ENCOUNTER — PATIENT MESSAGE (OUTPATIENT)
Dept: HEMATOLOGY/ONCOLOGY | Facility: CLINIC | Age: 41
End: 2021-05-21

## 2021-05-21 LAB
FINAL PATHOLOGIC DIAGNOSIS: NORMAL
Lab: NORMAL

## 2021-05-25 ENCOUNTER — PATIENT MESSAGE (OUTPATIENT)
Dept: SURGERY | Facility: CLINIC | Age: 41
End: 2021-05-25

## 2021-05-29 ENCOUNTER — PATIENT MESSAGE (OUTPATIENT)
Dept: OBSTETRICS AND GYNECOLOGY | Facility: CLINIC | Age: 41
End: 2021-05-29

## 2021-06-02 ENCOUNTER — OFFICE VISIT (OUTPATIENT)
Dept: SURGERY | Facility: CLINIC | Age: 41
End: 2021-06-02
Payer: COMMERCIAL

## 2021-06-02 VITALS
SYSTOLIC BLOOD PRESSURE: 112 MMHG | HEIGHT: 67 IN | OXYGEN SATURATION: 100 % | BODY MASS INDEX: 37.56 KG/M2 | HEART RATE: 62 BPM | WEIGHT: 239.31 LBS | TEMPERATURE: 98 F | DIASTOLIC BLOOD PRESSURE: 60 MMHG

## 2021-06-02 DIAGNOSIS — Z98.890 POST-OPERATIVE STATE: Primary | ICD-10-CM

## 2021-06-02 PROBLEM — K80.20 CHOLELITHIASIS: Status: RESOLVED | Noted: 2021-05-18 | Resolved: 2021-06-02

## 2021-06-02 PROBLEM — K80.20 GALLSTONES: Status: RESOLVED | Noted: 2021-04-23 | Resolved: 2021-06-02

## 2021-06-02 PROCEDURE — 99024 PR POST-OP FOLLOW-UP VISIT: ICD-10-PCS | Mod: S$GLB,,, | Performed by: SURGERY

## 2021-06-02 PROCEDURE — 99024 POSTOP FOLLOW-UP VISIT: CPT | Mod: S$GLB,,, | Performed by: SURGERY

## 2021-06-02 PROCEDURE — 99999 PR PBB SHADOW E&M-EST. PATIENT-LVL III: ICD-10-PCS | Mod: PBBFAC,,, | Performed by: SURGERY

## 2021-06-02 PROCEDURE — 99999 PR PBB SHADOW E&M-EST. PATIENT-LVL III: CPT | Mod: PBBFAC,,, | Performed by: SURGERY

## 2021-06-22 ENCOUNTER — PATIENT MESSAGE (OUTPATIENT)
Dept: SURGERY | Facility: CLINIC | Age: 41
End: 2021-06-22

## 2021-06-23 ENCOUNTER — TELEPHONE (OUTPATIENT)
Dept: SURGERY | Facility: CLINIC | Age: 41
End: 2021-06-23

## 2021-06-23 ENCOUNTER — PATIENT MESSAGE (OUTPATIENT)
Dept: SURGERY | Facility: CLINIC | Age: 41
End: 2021-06-23

## 2021-06-23 ENCOUNTER — HOSPITAL ENCOUNTER (EMERGENCY)
Facility: HOSPITAL | Age: 41
Discharge: HOME | End: 2021-06-23
Attending: EMERGENCY MEDICINE
Payer: COMMERCIAL

## 2021-06-23 VITALS
BODY MASS INDEX: 37.59 KG/M2 | SYSTOLIC BLOOD PRESSURE: 120 MMHG | RESPIRATION RATE: 16 BRPM | WEIGHT: 240 LBS | HEART RATE: 65 BPM | OXYGEN SATURATION: 100 % | TEMPERATURE: 98 F | DIASTOLIC BLOOD PRESSURE: 75 MMHG

## 2021-06-23 DIAGNOSIS — Z90.49 HISTORY OF CHOLECYSTECTOMY: ICD-10-CM

## 2021-06-23 DIAGNOSIS — R10.13 EPIGASTRIC PAIN: Primary | ICD-10-CM

## 2021-06-23 LAB
ALBUMIN SERPL BCP-MCNC: 4.1 G/DL (ref 3.5–5.2)
ALP SERPL-CCNC: 55 U/L (ref 55–135)
ALT SERPL W/O P-5'-P-CCNC: 27 U/L (ref 10–44)
ANION GAP SERPL CALC-SCNC: 7 MMOL/L (ref 8–16)
AST SERPL-CCNC: 20 U/L (ref 10–40)
B-HCG UR QL: NEGATIVE
BASOPHILS # BLD AUTO: 0.04 K/UL (ref 0–0.2)
BASOPHILS NFR BLD: 0.6 % (ref 0–1.9)
BILIRUB SERPL-MCNC: 0.2 MG/DL (ref 0.1–1)
BILIRUB UR QL STRIP: NEGATIVE
BUN SERPL-MCNC: 16 MG/DL (ref 6–20)
CALCIUM SERPL-MCNC: 9.8 MG/DL (ref 8.7–10.5)
CHLORIDE SERPL-SCNC: 103 MMOL/L (ref 95–110)
CLARITY UR REFRACT.AUTO: CLEAR
CO2 SERPL-SCNC: 28 MMOL/L (ref 23–29)
COLOR UR AUTO: YELLOW
CREAT SERPL-MCNC: 0.8 MG/DL (ref 0.5–1.4)
CTP QC/QA: YES
DIFFERENTIAL METHOD: NORMAL
EOSINOPHIL # BLD AUTO: 0.1 K/UL (ref 0–0.5)
EOSINOPHIL NFR BLD: 1.1 % (ref 0–8)
ERYTHROCYTE [DISTWIDTH] IN BLOOD BY AUTOMATED COUNT: 12.6 % (ref 11.5–14.5)
EST. GFR  (AFRICAN AMERICAN): >60 ML/MIN/1.73 M^2
EST. GFR  (NON AFRICAN AMERICAN): >60 ML/MIN/1.73 M^2
GLUCOSE SERPL-MCNC: 111 MG/DL (ref 70–110)
GLUCOSE UR QL STRIP: NEGATIVE
HCT VFR BLD AUTO: 39.9 % (ref 37–48.5)
HGB BLD-MCNC: 13.3 G/DL (ref 12–16)
HGB UR QL STRIP: NEGATIVE
IMM GRANULOCYTES # BLD AUTO: 0.02 K/UL (ref 0–0.04)
IMM GRANULOCYTES NFR BLD AUTO: 0.3 % (ref 0–0.5)
KETONES UR QL STRIP: NEGATIVE
LEUKOCYTE ESTERASE UR QL STRIP: NEGATIVE
LIPASE SERPL-CCNC: 29 U/L (ref 4–60)
LYMPHOCYTES # BLD AUTO: 3 K/UL (ref 1–4.8)
LYMPHOCYTES NFR BLD: 41.3 % (ref 18–48)
MCH RBC QN AUTO: 29.5 PG (ref 27–31)
MCHC RBC AUTO-ENTMCNC: 33.3 G/DL (ref 32–36)
MCV RBC AUTO: 89 FL (ref 82–98)
MONOCYTES # BLD AUTO: 0.5 K/UL (ref 0.3–1)
MONOCYTES NFR BLD: 7.1 % (ref 4–15)
NEUTROPHILS # BLD AUTO: 3.5 K/UL (ref 1.8–7.7)
NEUTROPHILS NFR BLD: 49.6 % (ref 38–73)
NITRITE UR QL STRIP: NEGATIVE
NRBC BLD-RTO: 0 /100 WBC
PH UR STRIP: 6 [PH] (ref 5–8)
PLATELET # BLD AUTO: 269 K/UL (ref 150–450)
PMV BLD AUTO: 10.3 FL (ref 9.2–12.9)
POTASSIUM SERPL-SCNC: 4 MMOL/L (ref 3.5–5.1)
PROT SERPL-MCNC: 7.4 G/DL (ref 6–8.4)
PROT UR QL STRIP: NEGATIVE
RBC # BLD AUTO: 4.51 M/UL (ref 4–5.4)
SODIUM SERPL-SCNC: 138 MMOL/L (ref 136–145)
SP GR UR STRIP: 1.01 (ref 1–1.03)
TROPONIN I SERPL DL<=0.01 NG/ML-MCNC: <0.006 NG/ML (ref 0–0.03)
URN SPEC COLLECT METH UR: NORMAL
WBC # BLD AUTO: 7.14 K/UL (ref 3.9–12.7)

## 2021-06-23 PROCEDURE — 93010 ELECTROCARDIOGRAM REPORT: CPT | Mod: ,,, | Performed by: INTERNAL MEDICINE

## 2021-06-23 PROCEDURE — 93010 EKG 12-LEAD: ICD-10-PCS | Mod: ,,, | Performed by: INTERNAL MEDICINE

## 2021-06-23 PROCEDURE — 81003 URINALYSIS AUTO W/O SCOPE: CPT | Performed by: PHYSICIAN ASSISTANT

## 2021-06-23 PROCEDURE — 80053 COMPREHEN METABOLIC PANEL: CPT | Performed by: PHYSICIAN ASSISTANT

## 2021-06-23 PROCEDURE — 84484 ASSAY OF TROPONIN QUANT: CPT | Performed by: STUDENT IN AN ORGANIZED HEALTH CARE EDUCATION/TRAINING PROGRAM

## 2021-06-23 PROCEDURE — 85025 COMPLETE CBC W/AUTO DIFF WBC: CPT | Performed by: PHYSICIAN ASSISTANT

## 2021-06-23 PROCEDURE — 25500020 PHARM REV CODE 255: Performed by: EMERGENCY MEDICINE

## 2021-06-23 PROCEDURE — 99284 EMERGENCY DEPT VISIT MOD MDM: CPT | Mod: ,,, | Performed by: EMERGENCY MEDICINE

## 2021-06-23 PROCEDURE — 99285 EMERGENCY DEPT VISIT HI MDM: CPT | Mod: 25

## 2021-06-23 PROCEDURE — 81025 URINE PREGNANCY TEST: CPT | Performed by: PHYSICIAN ASSISTANT

## 2021-06-23 PROCEDURE — 93005 ELECTROCARDIOGRAM TRACING: CPT

## 2021-06-23 PROCEDURE — 99284 PR EMERGENCY DEPT VISIT,LEVEL IV: ICD-10-PCS | Mod: ,,, | Performed by: EMERGENCY MEDICINE

## 2021-06-23 PROCEDURE — 83690 ASSAY OF LIPASE: CPT | Performed by: PHYSICIAN ASSISTANT

## 2021-06-23 RX ORDER — DICYCLOMINE HYDROCHLORIDE 20 MG/1
20 TABLET ORAL 2 TIMES DAILY PRN
Qty: 30 TABLET | Refills: 0 | Status: SHIPPED | OUTPATIENT
Start: 2021-06-23 | End: 2021-07-07

## 2021-06-23 RX ADMIN — IOHEXOL 75 ML: 350 INJECTION, SOLUTION INTRAVENOUS at 10:06

## 2021-06-24 ENCOUNTER — TELEPHONE (OUTPATIENT)
Dept: EMERGENCY MEDICINE | Facility: HOSPITAL | Age: 41
End: 2021-06-24

## 2021-07-06 ENCOUNTER — PATIENT MESSAGE (OUTPATIENT)
Dept: ADMINISTRATIVE | Facility: HOSPITAL | Age: 41
End: 2021-07-06

## 2021-08-02 ENCOUNTER — PATIENT OUTREACH (OUTPATIENT)
Dept: ADMINISTRATIVE | Facility: OTHER | Age: 41
End: 2021-08-02

## 2021-08-04 ENCOUNTER — PATIENT MESSAGE (OUTPATIENT)
Dept: OBSTETRICS AND GYNECOLOGY | Facility: CLINIC | Age: 41
End: 2021-08-04

## 2021-08-04 ENCOUNTER — TELEPHONE (OUTPATIENT)
Dept: OBSTETRICS AND GYNECOLOGY | Facility: CLINIC | Age: 41
End: 2021-08-04

## 2021-08-05 ENCOUNTER — OFFICE VISIT (OUTPATIENT)
Dept: OBSTETRICS AND GYNECOLOGY | Facility: CLINIC | Age: 41
End: 2021-08-05
Payer: COMMERCIAL

## 2021-08-05 VITALS
HEIGHT: 67 IN | WEIGHT: 240 LBS | HEART RATE: 80 BPM | BODY MASS INDEX: 37.67 KG/M2 | SYSTOLIC BLOOD PRESSURE: 124 MMHG | DIASTOLIC BLOOD PRESSURE: 68 MMHG

## 2021-08-05 DIAGNOSIS — Z12.31 ENCOUNTER FOR SCREENING MAMMOGRAM FOR MALIGNANT NEOPLASM OF BREAST: ICD-10-CM

## 2021-08-05 DIAGNOSIS — Z01.419 VISIT FOR GYNECOLOGIC EXAMINATION: Primary | ICD-10-CM

## 2021-08-05 DIAGNOSIS — Z30.431 ENCOUNTER FOR ROUTINE CHECKING OF INTRAUTERINE CONTRACEPTIVE DEVICE (IUD): ICD-10-CM

## 2021-08-05 PROCEDURE — 99999 PR PBB SHADOW E&M-EST. PATIENT-LVL III: CPT | Mod: PBBFAC,,, | Performed by: OBSTETRICS & GYNECOLOGY

## 2021-08-05 PROCEDURE — 99999 PR PBB SHADOW E&M-EST. PATIENT-LVL III: ICD-10-PCS | Mod: PBBFAC,,, | Performed by: OBSTETRICS & GYNECOLOGY

## 2021-08-05 PROCEDURE — 88175 CYTOPATH C/V AUTO FLUID REDO: CPT | Performed by: OBSTETRICS & GYNECOLOGY

## 2021-08-05 PROCEDURE — 99396 PR PREVENTIVE VISIT,EST,40-64: ICD-10-PCS | Mod: S$GLB,,, | Performed by: OBSTETRICS & GYNECOLOGY

## 2021-08-05 PROCEDURE — 99396 PREV VISIT EST AGE 40-64: CPT | Mod: S$GLB,,, | Performed by: OBSTETRICS & GYNECOLOGY

## 2021-08-05 PROCEDURE — 87624 HPV HI-RISK TYP POOLED RSLT: CPT | Performed by: OBSTETRICS & GYNECOLOGY

## 2021-08-09 LAB
FINAL PATHOLOGIC DIAGNOSIS: NORMAL
Lab: NORMAL

## 2021-08-12 LAB
HPV HR 12 DNA SPEC QL NAA+PROBE: NEGATIVE
HPV16 AG SPEC QL: NEGATIVE
HPV18 DNA SPEC QL NAA+PROBE: NEGATIVE

## 2021-08-15 ENCOUNTER — PATIENT MESSAGE (OUTPATIENT)
Dept: OBSTETRICS AND GYNECOLOGY | Facility: CLINIC | Age: 41
End: 2021-08-15

## 2021-08-18 ENCOUNTER — PATIENT MESSAGE (OUTPATIENT)
Dept: INTERNAL MEDICINE | Facility: CLINIC | Age: 41
End: 2021-08-18

## 2021-08-26 ENCOUNTER — OFFICE VISIT (OUTPATIENT)
Dept: INTERNAL MEDICINE | Facility: CLINIC | Age: 41
End: 2021-08-26
Payer: COMMERCIAL

## 2021-08-26 ENCOUNTER — LAB VISIT (OUTPATIENT)
Dept: LAB | Facility: HOSPITAL | Age: 41
End: 2021-08-26
Attending: INTERNAL MEDICINE
Payer: COMMERCIAL

## 2021-08-26 VITALS
SYSTOLIC BLOOD PRESSURE: 134 MMHG | WEIGHT: 239.19 LBS | DIASTOLIC BLOOD PRESSURE: 69 MMHG | BODY MASS INDEX: 37.46 KG/M2 | TEMPERATURE: 99 F | HEART RATE: 88 BPM

## 2021-08-26 DIAGNOSIS — R06.02 EXERTIONAL SHORTNESS OF BREATH: ICD-10-CM

## 2021-08-26 DIAGNOSIS — Z86.16 HISTORY OF COVID-19: Primary | ICD-10-CM

## 2021-08-26 DIAGNOSIS — J30.1 SEASONAL ALLERGIC RHINITIS DUE TO POLLEN: ICD-10-CM

## 2021-08-26 DIAGNOSIS — G47.33 OSA (OBSTRUCTIVE SLEEP APNEA): ICD-10-CM

## 2021-08-26 DIAGNOSIS — R73.9 HYPERGLYCEMIA: ICD-10-CM

## 2021-08-26 LAB
ALBUMIN SERPL BCP-MCNC: 4.3 G/DL (ref 3.5–5.2)
ALP SERPL-CCNC: 59 U/L (ref 55–135)
ALT SERPL W/O P-5'-P-CCNC: 27 U/L (ref 10–44)
ANION GAP SERPL CALC-SCNC: 7 MMOL/L (ref 8–16)
AST SERPL-CCNC: 19 U/L (ref 10–40)
BASOPHILS # BLD AUTO: 0.02 K/UL (ref 0–0.2)
BASOPHILS NFR BLD: 0.3 % (ref 0–1.9)
BILIRUB SERPL-MCNC: 0.5 MG/DL (ref 0.1–1)
BUN SERPL-MCNC: 11 MG/DL (ref 6–20)
CALCIUM SERPL-MCNC: 10.1 MG/DL (ref 8.7–10.5)
CHLORIDE SERPL-SCNC: 104 MMOL/L (ref 95–110)
CO2 SERPL-SCNC: 28 MMOL/L (ref 23–29)
CREAT SERPL-MCNC: 0.8 MG/DL (ref 0.5–1.4)
D DIMER PPP IA.FEU-MCNC: <0.19 MG/L FEU
DIFFERENTIAL METHOD: NORMAL
EOSINOPHIL # BLD AUTO: 0 K/UL (ref 0–0.5)
EOSINOPHIL NFR BLD: 0.5 % (ref 0–8)
ERYTHROCYTE [DISTWIDTH] IN BLOOD BY AUTOMATED COUNT: 12.8 % (ref 11.5–14.5)
EST. GFR  (AFRICAN AMERICAN): >60 ML/MIN/1.73 M^2
EST. GFR  (NON AFRICAN AMERICAN): >60 ML/MIN/1.73 M^2
ESTIMATED AVG GLUCOSE: 111 MG/DL (ref 68–131)
GLUCOSE SERPL-MCNC: 87 MG/DL (ref 70–110)
HBA1C MFR BLD: 5.5 % (ref 4–5.6)
HCT VFR BLD AUTO: 42.3 % (ref 37–48.5)
HGB BLD-MCNC: 14.3 G/DL (ref 12–16)
IMM GRANULOCYTES # BLD AUTO: 0.02 K/UL (ref 0–0.04)
IMM GRANULOCYTES NFR BLD AUTO: 0.3 % (ref 0–0.5)
LYMPHOCYTES # BLD AUTO: 2.4 K/UL (ref 1–4.8)
LYMPHOCYTES NFR BLD: 37.8 % (ref 18–48)
MCH RBC QN AUTO: 29.9 PG (ref 27–31)
MCHC RBC AUTO-ENTMCNC: 33.8 G/DL (ref 32–36)
MCV RBC AUTO: 89 FL (ref 82–98)
MONOCYTES # BLD AUTO: 0.4 K/UL (ref 0.3–1)
MONOCYTES NFR BLD: 6.1 % (ref 4–15)
NEUTROPHILS # BLD AUTO: 3.5 K/UL (ref 1.8–7.7)
NEUTROPHILS NFR BLD: 55 % (ref 38–73)
NRBC BLD-RTO: 0 /100 WBC
PLATELET # BLD AUTO: 261 K/UL (ref 150–450)
PMV BLD AUTO: 10.4 FL (ref 9.2–12.9)
POTASSIUM SERPL-SCNC: 3.7 MMOL/L (ref 3.5–5.1)
PROT SERPL-MCNC: 7.6 G/DL (ref 6–8.4)
RBC # BLD AUTO: 4.78 M/UL (ref 4–5.4)
SARS-COV-2 IGG SERPL IA-ACNC: 309 AU/ML
SARS-COV-2 IGG SERPL QL IA: POSITIVE
SODIUM SERPL-SCNC: 139 MMOL/L (ref 136–145)
WBC # BLD AUTO: 6.38 K/UL (ref 3.9–12.7)

## 2021-08-26 PROCEDURE — 86769 SARS-COV-2 COVID-19 ANTIBODY: CPT | Performed by: INTERNAL MEDICINE

## 2021-08-26 PROCEDURE — 85025 COMPLETE CBC W/AUTO DIFF WBC: CPT | Performed by: INTERNAL MEDICINE

## 2021-08-26 PROCEDURE — 83036 HEMOGLOBIN GLYCOSYLATED A1C: CPT | Performed by: INTERNAL MEDICINE

## 2021-08-26 PROCEDURE — 99214 OFFICE O/P EST MOD 30 MIN: CPT | Mod: S$GLB,,, | Performed by: INTERNAL MEDICINE

## 2021-08-26 PROCEDURE — 99999 PR PBB SHADOW E&M-EST. PATIENT-LVL II: CPT | Mod: PBBFAC,,, | Performed by: INTERNAL MEDICINE

## 2021-08-26 PROCEDURE — 36415 COLL VENOUS BLD VENIPUNCTURE: CPT | Performed by: INTERNAL MEDICINE

## 2021-08-26 PROCEDURE — 99999 PR PBB SHADOW E&M-EST. PATIENT-LVL II: ICD-10-PCS | Mod: PBBFAC,,, | Performed by: INTERNAL MEDICINE

## 2021-08-26 PROCEDURE — 85379 FIBRIN DEGRADATION QUANT: CPT | Performed by: INTERNAL MEDICINE

## 2021-08-26 PROCEDURE — 99214 PR OFFICE/OUTPT VISIT, EST, LEVL IV, 30-39 MIN: ICD-10-PCS | Mod: S$GLB,,, | Performed by: INTERNAL MEDICINE

## 2021-08-26 PROCEDURE — 80053 COMPREHEN METABOLIC PANEL: CPT | Performed by: INTERNAL MEDICINE

## 2021-08-26 RX ORDER — MONTELUKAST SODIUM 10 MG/1
10 TABLET ORAL NIGHTLY
COMMUNITY
End: 2022-09-07 | Stop reason: SDUPTHER

## 2021-08-27 ENCOUNTER — PATIENT MESSAGE (OUTPATIENT)
Dept: INTERNAL MEDICINE | Facility: CLINIC | Age: 41
End: 2021-08-27

## 2021-09-20 ENCOUNTER — PATIENT MESSAGE (OUTPATIENT)
Dept: INTERNAL MEDICINE | Facility: CLINIC | Age: 41
End: 2021-09-20

## 2021-09-20 DIAGNOSIS — Z20.822 ENCOUNTER FOR LABORATORY TESTING FOR COVID-19 VIRUS: ICD-10-CM

## 2021-09-21 ENCOUNTER — PATIENT MESSAGE (OUTPATIENT)
Dept: INTERNAL MEDICINE | Facility: CLINIC | Age: 41
End: 2021-09-21

## 2021-09-28 ENCOUNTER — PATIENT MESSAGE (OUTPATIENT)
Dept: INTERNAL MEDICINE | Facility: CLINIC | Age: 41
End: 2021-09-28

## 2021-09-29 ENCOUNTER — LAB VISIT (OUTPATIENT)
Dept: INTERNAL MEDICINE | Facility: CLINIC | Age: 41
End: 2021-09-29
Payer: COMMERCIAL

## 2021-09-29 DIAGNOSIS — Z20.822 ENCOUNTER FOR LABORATORY TESTING FOR COVID-19 VIRUS: ICD-10-CM

## 2021-09-29 LAB
SARS-COV-2 RNA RESP QL NAA+PROBE: NOT DETECTED
SARS-COV-2- CYCLE NUMBER: NORMAL

## 2021-09-29 PROCEDURE — U0005 INFEC AGEN DETEC AMPLI PROBE: HCPCS | Performed by: INTERNAL MEDICINE

## 2021-09-29 PROCEDURE — U0003 INFECTIOUS AGENT DETECTION BY NUCLEIC ACID (DNA OR RNA); SEVERE ACUTE RESPIRATORY SYNDROME CORONAVIRUS 2 (SARS-COV-2) (CORONAVIRUS DISEASE [COVID-19]), AMPLIFIED PROBE TECHNIQUE, MAKING USE OF HIGH THROUGHPUT TECHNOLOGIES AS DESCRIBED BY CMS-2020-01-R: HCPCS | Performed by: INTERNAL MEDICINE

## 2021-10-19 ENCOUNTER — PATIENT MESSAGE (OUTPATIENT)
Dept: INTERNAL MEDICINE | Facility: CLINIC | Age: 41
End: 2021-10-19

## 2021-10-20 ENCOUNTER — PATIENT MESSAGE (OUTPATIENT)
Dept: OBSTETRICS AND GYNECOLOGY | Facility: CLINIC | Age: 41
End: 2021-10-20
Payer: COMMERCIAL

## 2021-10-20 ENCOUNTER — PATIENT MESSAGE (OUTPATIENT)
Dept: INTERNAL MEDICINE | Facility: CLINIC | Age: 41
End: 2021-10-20
Payer: COMMERCIAL

## 2021-10-27 ENCOUNTER — LAB VISIT (OUTPATIENT)
Dept: LAB | Facility: HOSPITAL | Age: 41
End: 2021-10-27
Attending: OBSTETRICS & GYNECOLOGY
Payer: COMMERCIAL

## 2021-10-27 ENCOUNTER — OFFICE VISIT (OUTPATIENT)
Dept: OBSTETRICS AND GYNECOLOGY | Facility: CLINIC | Age: 41
End: 2021-10-27
Payer: COMMERCIAL

## 2021-10-27 VITALS
WEIGHT: 243.75 LBS | DIASTOLIC BLOOD PRESSURE: 70 MMHG | SYSTOLIC BLOOD PRESSURE: 110 MMHG | BODY MASS INDEX: 38.26 KG/M2 | HEIGHT: 67 IN

## 2021-10-27 DIAGNOSIS — L70.9 ACNE, UNSPECIFIED ACNE TYPE: Primary | ICD-10-CM

## 2021-10-27 DIAGNOSIS — R53.81 CHRONIC FATIGUE AND MALAISE: ICD-10-CM

## 2021-10-27 DIAGNOSIS — R53.82 CHRONIC FATIGUE AND MALAISE: ICD-10-CM

## 2021-10-27 DIAGNOSIS — N95.1 HOT FLUSHES, PERIMENOPAUSAL: ICD-10-CM

## 2021-10-27 DIAGNOSIS — L70.9 ACNE, UNSPECIFIED ACNE TYPE: ICD-10-CM

## 2021-10-27 LAB
ESTRADIOL SERPL-MCNC: 61 PG/ML
FSH SERPL-ACNC: 15 MIU/ML
TESTOST SERPL-MCNC: 23 NG/DL (ref 5–73)
TSH SERPL DL<=0.005 MIU/L-ACNC: 1.25 UIU/ML (ref 0.4–4)

## 2021-10-27 PROCEDURE — 99999 PR PBB SHADOW E&M-EST. PATIENT-LVL II: ICD-10-PCS | Mod: PBBFAC,,, | Performed by: OBSTETRICS & GYNECOLOGY

## 2021-10-27 PROCEDURE — 82670 ASSAY OF TOTAL ESTRADIOL: CPT | Performed by: OBSTETRICS & GYNECOLOGY

## 2021-10-27 PROCEDURE — 36415 COLL VENOUS BLD VENIPUNCTURE: CPT | Performed by: OBSTETRICS & GYNECOLOGY

## 2021-10-27 PROCEDURE — 99999 PR PBB SHADOW E&M-EST. PATIENT-LVL II: CPT | Mod: PBBFAC,,, | Performed by: OBSTETRICS & GYNECOLOGY

## 2021-10-27 PROCEDURE — 84443 ASSAY THYROID STIM HORMONE: CPT | Performed by: OBSTETRICS & GYNECOLOGY

## 2021-10-27 PROCEDURE — 83001 ASSAY OF GONADOTROPIN (FSH): CPT | Performed by: OBSTETRICS & GYNECOLOGY

## 2021-10-27 PROCEDURE — 99213 PR OFFICE/OUTPT VISIT, EST, LEVL III, 20-29 MIN: ICD-10-PCS | Mod: S$GLB,,, | Performed by: OBSTETRICS & GYNECOLOGY

## 2021-10-27 PROCEDURE — 99213 OFFICE O/P EST LOW 20 MIN: CPT | Mod: S$GLB,,, | Performed by: OBSTETRICS & GYNECOLOGY

## 2021-10-27 PROCEDURE — 84403 ASSAY OF TOTAL TESTOSTERONE: CPT | Performed by: OBSTETRICS & GYNECOLOGY

## 2021-11-03 ENCOUNTER — TELEPHONE (OUTPATIENT)
Dept: INTERNAL MEDICINE | Facility: CLINIC | Age: 41
End: 2021-11-03
Payer: COMMERCIAL

## 2021-11-04 ENCOUNTER — OFFICE VISIT (OUTPATIENT)
Dept: INTERNAL MEDICINE | Facility: CLINIC | Age: 41
End: 2021-11-04
Payer: COMMERCIAL

## 2021-11-04 VITALS
BODY MASS INDEX: 36.92 KG/M2 | SYSTOLIC BLOOD PRESSURE: 126 MMHG | HEIGHT: 68 IN | HEART RATE: 82 BPM | TEMPERATURE: 99 F | DIASTOLIC BLOOD PRESSURE: 84 MMHG | OXYGEN SATURATION: 99 % | WEIGHT: 243.63 LBS

## 2021-11-04 DIAGNOSIS — J30.1 SEASONAL ALLERGIC RHINITIS DUE TO POLLEN: Primary | ICD-10-CM

## 2021-11-04 DIAGNOSIS — J01.00 ACUTE NON-RECURRENT MAXILLARY SINUSITIS: ICD-10-CM

## 2021-11-04 PROCEDURE — 99213 OFFICE O/P EST LOW 20 MIN: CPT | Mod: S$GLB,,, | Performed by: NURSE PRACTITIONER

## 2021-11-04 PROCEDURE — 99213 PR OFFICE/OUTPT VISIT, EST, LEVL III, 20-29 MIN: ICD-10-PCS | Mod: S$GLB,,, | Performed by: NURSE PRACTITIONER

## 2021-11-04 PROCEDURE — 99999 PR PBB SHADOW E&M-EST. PATIENT-LVL III: CPT | Mod: PBBFAC,,, | Performed by: NURSE PRACTITIONER

## 2021-11-04 PROCEDURE — 99999 PR PBB SHADOW E&M-EST. PATIENT-LVL III: ICD-10-PCS | Mod: PBBFAC,,, | Performed by: NURSE PRACTITIONER

## 2021-11-04 RX ORDER — FLUOROMETHOLONE ACETATE 1 MG/ML
SUSPENSION/ DROPS OPHTHALMIC
COMMUNITY
Start: 2021-09-27 | End: 2024-03-13

## 2021-11-04 RX ORDER — METHYLPREDNISOLONE 4 MG/1
TABLET ORAL
Qty: 1 EACH | Refills: 0 | Status: SHIPPED | OUTPATIENT
Start: 2021-11-04 | End: 2021-11-25

## 2021-11-18 ENCOUNTER — HOSPITAL ENCOUNTER (OUTPATIENT)
Dept: RADIOLOGY | Facility: HOSPITAL | Age: 41
Discharge: HOME | End: 2021-11-18
Attending: OBSTETRICS & GYNECOLOGY
Payer: COMMERCIAL

## 2021-11-18 DIAGNOSIS — Z12.31 ENCOUNTER FOR SCREENING MAMMOGRAM FOR MALIGNANT NEOPLASM OF BREAST: ICD-10-CM

## 2021-11-18 PROCEDURE — 77067 MAMMO DIGITAL SCREENING BILAT WITH TOMO: ICD-10-PCS | Mod: 26,,, | Performed by: RADIOLOGY

## 2021-11-18 PROCEDURE — 77063 MAMMO DIGITAL SCREENING BILAT WITH TOMO: ICD-10-PCS | Mod: 26,,, | Performed by: RADIOLOGY

## 2021-11-18 PROCEDURE — 77067 SCR MAMMO BI INCL CAD: CPT | Mod: 26,,, | Performed by: RADIOLOGY

## 2021-11-18 PROCEDURE — 77067 SCR MAMMO BI INCL CAD: CPT | Mod: TC

## 2021-11-18 PROCEDURE — 77063 BREAST TOMOSYNTHESIS BI: CPT | Mod: 26,,, | Performed by: RADIOLOGY

## 2021-11-30 ENCOUNTER — PATIENT MESSAGE (OUTPATIENT)
Dept: INTERNAL MEDICINE | Facility: CLINIC | Age: 41
End: 2021-11-30
Payer: COMMERCIAL

## 2021-12-07 ENCOUNTER — PATIENT MESSAGE (OUTPATIENT)
Dept: OBSTETRICS AND GYNECOLOGY | Facility: CLINIC | Age: 41
End: 2021-12-07
Payer: COMMERCIAL

## 2021-12-09 ENCOUNTER — OFFICE VISIT (OUTPATIENT)
Dept: OBSTETRICS AND GYNECOLOGY | Facility: CLINIC | Age: 41
End: 2021-12-09
Payer: COMMERCIAL

## 2021-12-09 VITALS
DIASTOLIC BLOOD PRESSURE: 72 MMHG | BODY MASS INDEX: 36.27 KG/M2 | SYSTOLIC BLOOD PRESSURE: 108 MMHG | WEIGHT: 238.56 LBS

## 2021-12-09 DIAGNOSIS — Z97.5 IUD (INTRAUTERINE DEVICE) IN PLACE: ICD-10-CM

## 2021-12-09 DIAGNOSIS — N92.3 INTERMENSTRUAL SPOTTING: ICD-10-CM

## 2021-12-09 DIAGNOSIS — Z30.431 ENCOUNTER FOR ROUTINE CHECKING OF INTRAUTERINE CONTRACEPTIVE DEVICE (IUD): Primary | ICD-10-CM

## 2021-12-09 PROCEDURE — 99999 PR PBB SHADOW E&M-EST. PATIENT-LVL II: ICD-10-PCS | Mod: PBBFAC,,, | Performed by: PHYSICIAN ASSISTANT

## 2021-12-09 PROCEDURE — 99999 PR PBB SHADOW E&M-EST. PATIENT-LVL II: CPT | Mod: PBBFAC,,, | Performed by: PHYSICIAN ASSISTANT

## 2021-12-09 PROCEDURE — 99213 PR OFFICE/OUTPT VISIT, EST, LEVL III, 20-29 MIN: ICD-10-PCS | Mod: S$GLB,,, | Performed by: PHYSICIAN ASSISTANT

## 2021-12-09 PROCEDURE — 99213 OFFICE O/P EST LOW 20 MIN: CPT | Mod: S$GLB,,, | Performed by: PHYSICIAN ASSISTANT

## 2021-12-09 PROCEDURE — 87481 CANDIDA DNA AMP PROBE: CPT | Mod: 59 | Performed by: PHYSICIAN ASSISTANT

## 2021-12-13 LAB
BACTERIAL VAGINOSIS DNA: POSITIVE
CANDIDA GLABRATA DNA: NEGATIVE
CANDIDA KRUSEI DNA: NEGATIVE
CANDIDA RRNA VAG QL PROBE: NEGATIVE
T VAGINALIS RRNA GENITAL QL PROBE: NEGATIVE

## 2021-12-14 ENCOUNTER — PATIENT MESSAGE (OUTPATIENT)
Dept: OBSTETRICS AND GYNECOLOGY | Facility: CLINIC | Age: 41
End: 2021-12-14
Payer: COMMERCIAL

## 2021-12-14 DIAGNOSIS — N76.0 BACTERIAL VAGINOSIS: Primary | ICD-10-CM

## 2021-12-14 DIAGNOSIS — B96.89 BACTERIAL VAGINOSIS: Primary | ICD-10-CM

## 2021-12-14 RX ORDER — METRONIDAZOLE 500 MG/1
500 TABLET ORAL 2 TIMES DAILY
Qty: 14 TABLET | Refills: 0 | Status: SHIPPED | OUTPATIENT
Start: 2021-12-14 | End: 2021-12-21

## 2022-01-20 ENCOUNTER — HOSPITAL ENCOUNTER (OUTPATIENT)
Dept: VASCULAR SURGERY | Facility: CLINIC | Age: 42
Discharge: HOME | End: 2022-01-20
Attending: INTERNAL MEDICINE
Payer: COMMERCIAL

## 2022-01-20 ENCOUNTER — PATIENT MESSAGE (OUTPATIENT)
Dept: INTERNAL MEDICINE | Facility: CLINIC | Age: 42
End: 2022-01-20
Payer: COMMERCIAL

## 2022-01-20 ENCOUNTER — OFFICE VISIT (OUTPATIENT)
Dept: INTERNAL MEDICINE | Facility: CLINIC | Age: 42
End: 2022-01-20
Payer: COMMERCIAL

## 2022-01-20 VITALS
BODY MASS INDEX: 36.42 KG/M2 | WEIGHT: 240.31 LBS | DIASTOLIC BLOOD PRESSURE: 83 MMHG | SYSTOLIC BLOOD PRESSURE: 115 MMHG | HEIGHT: 68 IN | HEART RATE: 111 BPM | TEMPERATURE: 100 F

## 2022-01-20 DIAGNOSIS — Z86.718 HISTORY OF DVT (DEEP VEIN THROMBOSIS): ICD-10-CM

## 2022-01-20 DIAGNOSIS — M79.605 LEFT LEG PAIN: Primary | ICD-10-CM

## 2022-01-20 DIAGNOSIS — B96.89 ACUTE BACTERIAL SINUSITIS: ICD-10-CM

## 2022-01-20 DIAGNOSIS — M79.605 LEFT LEG PAIN: ICD-10-CM

## 2022-01-20 DIAGNOSIS — J01.90 ACUTE BACTERIAL SINUSITIS: ICD-10-CM

## 2022-01-20 PROCEDURE — 93971 PR US DUPLEX, UPPER OR LOWER EXT VENOUS,UNILAT OR LTD: ICD-10-PCS | Mod: S$GLB,,, | Performed by: SURGERY

## 2022-01-20 PROCEDURE — 99214 OFFICE O/P EST MOD 30 MIN: CPT | Mod: S$GLB,,, | Performed by: INTERNAL MEDICINE

## 2022-01-20 PROCEDURE — 93971 EXTREMITY STUDY: CPT | Mod: S$GLB,,, | Performed by: SURGERY

## 2022-01-20 PROCEDURE — 99999 PR PBB SHADOW E&M-EST. PATIENT-LVL III: CPT | Mod: PBBFAC,,, | Performed by: INTERNAL MEDICINE

## 2022-01-20 PROCEDURE — 99214 PR OFFICE/OUTPT VISIT, EST, LEVL IV, 30-39 MIN: ICD-10-PCS | Mod: S$GLB,,, | Performed by: INTERNAL MEDICINE

## 2022-01-20 PROCEDURE — 99999 PR PBB SHADOW E&M-EST. PATIENT-LVL III: ICD-10-PCS | Mod: PBBFAC,,, | Performed by: INTERNAL MEDICINE

## 2022-01-20 RX ORDER — PREDNISONE 20 MG/1
40 TABLET ORAL DAILY
Qty: 10 TABLET | Refills: 0 | Status: SHIPPED | OUTPATIENT
Start: 2022-01-20 | End: 2022-01-25

## 2022-01-20 RX ORDER — FLUTICASONE PROPIONATE 50 MCG
SPRAY, SUSPENSION (ML) NASAL
COMMUNITY
Start: 2021-11-03 | End: 2023-09-01

## 2022-01-20 RX ORDER — FLUCONAZOLE 200 MG/1
200 TABLET ORAL DAILY
Qty: 3 TABLET | Refills: 0 | Status: SHIPPED | OUTPATIENT
Start: 2022-01-20 | End: 2022-02-07 | Stop reason: SDUPTHER

## 2022-01-20 RX ORDER — AMOXICILLIN AND CLAVULANATE POTASSIUM 875; 125 MG/1; MG/1
1 TABLET, FILM COATED ORAL EVERY 12 HOURS
Qty: 14 TABLET | Refills: 0 | Status: SHIPPED | OUTPATIENT
Start: 2022-01-20 | End: 2022-01-28 | Stop reason: SDUPTHER

## 2022-01-20 RX ORDER — GUAIFENESIN AND DEXTROMETHORPHAN HYDROBROMIDE 600; 30 MG/1; MG/1
1 TABLET, EXTENDED RELEASE ORAL 2 TIMES DAILY
Qty: 20 TABLET | Refills: 0 | Status: SHIPPED | OUTPATIENT
Start: 2022-01-20 | End: 2022-01-30

## 2022-01-21 NOTE — PROGRESS NOTES
Subjective:       Patient ID: Antonia Jeffers is a 41 y.o. female.    Chief Complaint: Cough, Fatigue, Sinusitis, Leg Swelling (left), and Sore Throat    41-year-old nonsmoking female who is here for an acute care visit due to left lower extremity swelling, knee pain and throbbing since she went hunting this past weekend.  She had 3 hr drive to the hunting camp and she also more importantly has a history of a DVT in that leg back in 2018. This DVT back in 2018 was not prompted burn surgery, travel; however she was on birth control.  Her sister also had a pulmonary emboli and has a factor 5 deficiency.  She in addition has been suffering from sinus congestion, pressure, ear popping and a postnasal drip for the past couple of days but this has been building for the past couple weeks.  Does have a history of allergic rhinitis.    Review of Systems   Constitutional: Negative for activity change, appetite change, chills, diaphoresis, fatigue, fever and unexpected weight change.   HENT: Positive for nasal congestion, postnasal drip and sinus pressure/congestion. Negative for ear pain, mouth sores, sore throat and trouble swallowing.    Eyes: Negative for pain, redness and visual disturbance.   Respiratory: Positive for cough. Negative for apnea, chest tightness, shortness of breath and wheezing.    Cardiovascular: Negative for chest pain, palpitations and leg swelling.   Gastrointestinal: Negative for abdominal distention, abdominal pain, blood in stool, constipation, diarrhea, nausea and vomiting.   Endocrine: Negative for cold intolerance, polydipsia, polyphagia and polyuria.   Genitourinary: Negative for difficulty urinating, dysuria, flank pain, frequency, hematuria, menstrual problem, pelvic pain and urgency.   Musculoskeletal: Positive for arthralgias and leg pain. Negative for back pain, joint swelling and neck pain.   Integumentary:  Negative for color change, rash and wound.   Neurological: Negative for  dizziness, tremors, seizures, syncope, weakness, light-headedness, numbness and headaches.   Hematological: Negative for adenopathy. Does not bruise/bleed easily.   Psychiatric/Behavioral: Negative for confusion, decreased concentration, dysphoric mood, hallucinations, self-injury, sleep disturbance and suicidal ideas. The patient is not nervous/anxious.          Objective:      Physical Exam  HENT:      Head: Normocephalic and atraumatic.      Right Ear: Tympanic membrane normal.      Left Ear: Tympanic membrane normal.      Mouth/Throat:      Pharynx: Posterior oropharyngeal erythema present.   Cardiovascular:      Rate and Rhythm: Normal rate and regular rhythm.      Heart sounds: No murmur heard.      Pulmonary:      Effort: Pulmonary effort is normal.      Breath sounds: Normal breath sounds. No rales.   Abdominal:      General: Bowel sounds are normal. There is no distension.      Palpations: Abdomen is soft.      Tenderness: There is no abdominal tenderness.   Musculoskeletal:         General: No tenderness.      Right lower leg: Normal.      Left lower leg: Swelling present. Edema present.   Neurological:      Mental Status: She is alert and oriented to person, place, and time.         Assessment/plan       Problem List Items Addressed This Visit    None     Visit Diagnoses     Left leg pain    -  Primary    Relevant Orders    Vascular Lab () US Venous Leg Left    History of DVT (deep vein thrombosis)        Relevant Orders    Vascular Lab () US Venous Leg Left    Acute bacterial sinusitis        Relevant Medications    predniSONE (DELTASONE) 20 MG tablet    amoxicillin-clavulanate 875-125mg (AUGMENTIN) 875-125 mg per tablet    dextromethorphan-guaiFENesin  mg (MUCINEX DM)  mg per 12 hr tablet    fluconazole (DIFLUCAN) 200 MG Tab

## 2022-01-28 ENCOUNTER — PATIENT MESSAGE (OUTPATIENT)
Dept: INTERNAL MEDICINE | Facility: CLINIC | Age: 42
End: 2022-01-28
Payer: COMMERCIAL

## 2022-01-28 DIAGNOSIS — B96.89 ACUTE BACTERIAL SINUSITIS: ICD-10-CM

## 2022-01-28 DIAGNOSIS — J01.90 ACUTE BACTERIAL SINUSITIS: ICD-10-CM

## 2022-01-28 RX ORDER — AMOXICILLIN AND CLAVULANATE POTASSIUM 875; 125 MG/1; MG/1
1 TABLET, FILM COATED ORAL EVERY 12 HOURS
Qty: 14 TABLET | Refills: 0 | Status: SHIPPED | OUTPATIENT
Start: 2022-01-28 | End: 2022-02-04

## 2022-02-07 DIAGNOSIS — J01.90 ACUTE BACTERIAL SINUSITIS: ICD-10-CM

## 2022-02-07 DIAGNOSIS — B96.89 ACUTE BACTERIAL SINUSITIS: ICD-10-CM

## 2022-02-07 RX ORDER — FLUCONAZOLE 200 MG/1
200 TABLET ORAL DAILY
Qty: 2 TABLET | Refills: 1 | Status: SHIPPED | OUTPATIENT
Start: 2022-02-07 | End: 2022-03-09

## 2022-02-27 ENCOUNTER — PATIENT MESSAGE (OUTPATIENT)
Dept: INTERNAL MEDICINE | Facility: CLINIC | Age: 42
End: 2022-02-27
Payer: COMMERCIAL

## 2022-02-28 ENCOUNTER — PATIENT MESSAGE (OUTPATIENT)
Dept: INTERNAL MEDICINE | Facility: CLINIC | Age: 42
End: 2022-02-28
Payer: COMMERCIAL

## 2022-03-07 ENCOUNTER — HOSPITAL ENCOUNTER (OUTPATIENT)
Dept: RADIOLOGY | Facility: HOSPITAL | Age: 42
Discharge: HOME | End: 2022-03-07
Attending: INTERNAL MEDICINE
Payer: COMMERCIAL

## 2022-03-07 ENCOUNTER — OFFICE VISIT (OUTPATIENT)
Dept: INTERNAL MEDICINE | Facility: CLINIC | Age: 42
End: 2022-03-07
Payer: COMMERCIAL

## 2022-03-07 VITALS
BODY MASS INDEX: 36.68 KG/M2 | DIASTOLIC BLOOD PRESSURE: 65 MMHG | HEIGHT: 68 IN | WEIGHT: 242 LBS | SYSTOLIC BLOOD PRESSURE: 118 MMHG

## 2022-03-07 DIAGNOSIS — Q18.1 PREAURICULAR CYST: ICD-10-CM

## 2022-03-07 DIAGNOSIS — I89.9 LYMPH NODE DISORDER: ICD-10-CM

## 2022-03-07 DIAGNOSIS — Q18.1 PREAURICULAR CYST: Primary | ICD-10-CM

## 2022-03-07 PROBLEM — E66.811 OBESITY (BMI 30.0-34.9): Status: RESOLVED | Noted: 2017-08-17 | Resolved: 2022-03-07

## 2022-03-07 PROBLEM — F43.9 SITUATIONAL STRESS: Status: RESOLVED | Noted: 2020-05-26 | Resolved: 2022-03-07

## 2022-03-07 PROBLEM — E66.9 OBESITY (BMI 30.0-34.9): Status: RESOLVED | Noted: 2017-08-17 | Resolved: 2022-03-07

## 2022-03-07 PROCEDURE — 71046 X-RAY EXAM CHEST 2 VIEWS: CPT | Mod: TC

## 2022-03-07 PROCEDURE — 71046 XR CHEST PA AND LATERAL: ICD-10-PCS | Mod: 26,,, | Performed by: RADIOLOGY

## 2022-03-07 PROCEDURE — 71046 X-RAY EXAM CHEST 2 VIEWS: CPT | Mod: 26,,, | Performed by: RADIOLOGY

## 2022-03-07 PROCEDURE — 99214 OFFICE O/P EST MOD 30 MIN: CPT | Mod: S$GLB,,, | Performed by: INTERNAL MEDICINE

## 2022-03-07 PROCEDURE — 99999 PR PBB SHADOW E&M-EST. PATIENT-LVL III: ICD-10-PCS | Mod: PBBFAC,,, | Performed by: INTERNAL MEDICINE

## 2022-03-07 PROCEDURE — 76536 US EXAM OF HEAD AND NECK: CPT | Mod: TC

## 2022-03-07 PROCEDURE — 99214 PR OFFICE/OUTPT VISIT, EST, LEVL IV, 30-39 MIN: ICD-10-PCS | Mod: S$GLB,,, | Performed by: INTERNAL MEDICINE

## 2022-03-07 PROCEDURE — 99999 PR PBB SHADOW E&M-EST. PATIENT-LVL III: CPT | Mod: PBBFAC,,, | Performed by: INTERNAL MEDICINE

## 2022-03-07 PROCEDURE — 76536 US SOFT TISSUE HEAD NECK THYROID: ICD-10-PCS | Mod: 26,,, | Performed by: RADIOLOGY

## 2022-03-07 PROCEDURE — 76536 US EXAM OF HEAD AND NECK: CPT | Mod: 26,,, | Performed by: RADIOLOGY

## 2022-03-07 NOTE — PROGRESS NOTES
Chief Complaint  Chief Complaint   Patient presents with    Facial Swelling    Follow-up       HPI  Antonia Jeffers is a 41 y.o. female with multiple medical diagnoses as listed in the medical history and problem list that presents for L preauricular mass.  Their last appointment with primary care was 2021. She reports she noticed this swelling 5 days ago while washing her face. She is concerned about lymphoma as her mother and maternal great aunt had diffuse large B Cell Lymphoma diagnosed in late 50's and early 60's with the same preauricular lymph node presentation. She denies night sweats, fevers, weight loss, or chills. She does report mild fatigue and an inability to lose weight since her cholecystectomy last year. She reports for exercise she walks 2x/week for 1 hour and eats vegetables and lean meats as well as cutting out soda and carbs.      PAST MEDICAL HISTORY:  Past Medical History:   Diagnosis Date    Abnormal Pap smear     ABN pap ~19 yo s/p laser cervix and since then all pap has been normal seen only Dr. Luna since    Acute deep vein thrombosis (DVT) of femoral vein of left lower extremity 2018    Cholelithiasis 2021    Gallstones: see CT 2021    GERD (gastroesophageal reflux disease)     Seasonal allergic rhinitis due to pollen 2017       PAST SURGICAL HISTORY:  Past Surgical History:   Procedure Laterality Date     SECTION      CHOLECYSTECTOMY      LAPAROSCOPIC CHOLECYSTECTOMY N/A 2021    Procedure: CHOLECYSTECTOMY, LAPAROSCOPIC;  Surgeon: Luis Rodriguez MD;  Location: [facility] OR [address] [place] [address];  Service: General;  Laterality: N/A;       SOCIAL HISTORY:  Social History     Socioeconomic History    Marital status:     Number of children: 1   Tobacco Use    Smoking status: Never Smoker    Smokeless tobacco: Never Used   Substance and Sexual Activity    Alcohol use: No     Comment: rarely, 2-3 drinks a week maximum    Drug use: No     Sexual activity: Yes     Partners: Male     Birth control/protection: I.U.D.   Social History Narrative    ** Merged History Encounter **            FAMILY HISTORY:  Family History   Problem Relation Age of Onset    Cancer Mother         follicular lymphoma, thyroid; B lymphoma x 2    Lymphoma Mother 60    Diabetes Mother     Hypertension Mother     Depression Mother     Thyroid disease Mother     Heart disease Mother         A fib    Hyperlipidemia Mother     Diabetes Father     Hypertension Father     Heart disease Father         A fib    Thyroid disease Father     Hyperlipidemia Father     No Known Problems Sister     Clotting disorder Sister     Thyroid disease Sister     Factor V Leiden deficiency Sister     Deep vein thrombosis Sister     Cancer Maternal Grandmother         Brain    Diabetes Maternal Grandmother     No Known Problems Son     Cancer Other     Lymphoma Other     Breast cancer Neg Hx     Colon cancer Neg Hx     Ovarian cancer Neg Hx        ALLERGIES AND MEDICATIONS: updated and reviewed.  Review of patient's allergies indicates:  No Known Allergies  Current Outpatient Medications   Medication Sig Dispense Refill    FLAREX 0.1 % DrpS       fluticasone propionate (FLONASE) 50 mcg/actuation nasal spray       levonorgestreL (MIRENA) 20 mcg/24 hours (7 yrs) 52 mg IUD 1 each by Intrauterine route once.      montelukast (SINGULAIR) 10 mg tablet Take 10 mg by mouth every evening.      fluconazole (DIFLUCAN) 200 MG Tab Take 1 tablet (200 mg total) by mouth once daily. (Patient not taking: Reported on 3/7/2022) 2 tablet 1     No current facility-administered medications for this visit.         ROS  Review of Systems   Constitutional: Positive for fatigue and unexpected weight change. Negative for activity change, chills and fever.   HENT: Positive for facial swelling. Negative for hearing loss, rhinorrhea and trouble swallowing.         L preauricular swelling    Eyes:  "Negative for discharge and visual disturbance.   Respiratory: Negative for chest tightness and wheezing.    Cardiovascular: Negative for chest pain and palpitations.   Gastrointestinal: Negative for blood in stool, constipation, diarrhea and vomiting.   Endocrine: Negative for polydipsia and polyuria.   Genitourinary: Negative for difficulty urinating, dysuria, hematuria and menstrual problem.   Musculoskeletal: Negative for arthralgias, joint swelling and neck pain.   Neurological: Negative for weakness and headaches.   Psychiatric/Behavioral: Negative for confusion and dysphoric mood.           Physical Exam  Vitals:    03/07/22 0759   BP: 118/65   BP Location: Right arm   Patient Position: Sitting   BP Method: Medium (Manual)   Weight: 109.8 kg (242 lb)   Height: 5' 8" (1.727 m)    Body mass index is 36.8 kg/m².  Weight: 109.8 kg (242 lb)   Height: 5' 8" (172.7 cm)   Physical Exam  Constitutional:       Appearance: Normal appearance.   HENT:      Head:      Comments: Preauricular L swelling     Mouth/Throat:      Mouth: Mucous membranes are moist.      Pharynx: Oropharynx is clear.   Cardiovascular:      Rate and Rhythm: Normal rate and regular rhythm.      Pulses: Normal pulses.      Heart sounds: Normal heart sounds.   Pulmonary:      Effort: Pulmonary effort is normal.      Breath sounds: Normal breath sounds.   Abdominal:      General: Abdomen is flat.      Palpations: Abdomen is soft.   Neurological:      Mental Status: She is alert.       Health Maintenance       Date Due Completion Date    COVID-19 Vaccine (1) Never done ---    Influenza Vaccine (1) 09/01/2021 2/7/2019    Mammogram 11/18/2022 11/18/2021    Cervical Cancer Screening 08/05/2026 8/5/2021    Override on 6/13/2012: Done (Outside MD)    TETANUS VACCINE 10/11/2028 10/11/2018            Assessment and Plan:  Preauricular cyst vs Enlarged Lymph Node  -     CBC Auto Differential; Future; Expected date: 03/07/2022  -     Comprehensive Metabolic " Panel; Future; Expected date: 03/07/2022  -     TSH; Future; Expected date: 03/07/2022  -     Irma-Barr Virus (EBV) Antibody Panel; Future; Expected date: 03/07/2022  -     X-Ray Chest PA And Lateral; Future; Expected date: 03/07/2022  -     US Soft Tissue Head Neck Thyroid; Future; Expected date: 03/07/2022        Nikole Raya, MS4    Patient seen and examined with student.  Swelling in the preauricular area on the left side not very impressive but will evaluate given patient's subjective concerns.  Alarm symptoms and red flags reviewed, she is having none    Will address weight at another visit    I will review all studies and determine further tx depending on findings

## 2022-03-08 ENCOUNTER — PATIENT MESSAGE (OUTPATIENT)
Dept: INTERNAL MEDICINE | Facility: CLINIC | Age: 42
End: 2022-03-08
Payer: COMMERCIAL

## 2022-03-08 NOTE — TELEPHONE ENCOUNTER
Okay for video visit for Wegovy    Please ask her to review whether that medication is covered by her insurance, it is extremely expensive.  She will want to price it

## 2022-03-15 ENCOUNTER — OFFICE VISIT (OUTPATIENT)
Dept: INTERNAL MEDICINE | Facility: CLINIC | Age: 42
End: 2022-03-15
Payer: COMMERCIAL

## 2022-03-15 ENCOUNTER — PATIENT MESSAGE (OUTPATIENT)
Dept: INTERNAL MEDICINE | Facility: CLINIC | Age: 42
End: 2022-03-15

## 2022-03-15 DIAGNOSIS — G47.33 OSA (OBSTRUCTIVE SLEEP APNEA): ICD-10-CM

## 2022-03-15 DIAGNOSIS — E66.01 SEVERE OBESITY (BMI 35.0-35.9 WITH COMORBIDITY): ICD-10-CM

## 2022-03-15 DIAGNOSIS — E66.01 SEVERE OBESITY (BMI 35.0-35.9 WITH COMORBIDITY): Primary | ICD-10-CM

## 2022-03-15 PROCEDURE — 99213 OFFICE O/P EST LOW 20 MIN: CPT | Mod: 95,,, | Performed by: INTERNAL MEDICINE

## 2022-03-15 PROCEDURE — 99213 PR OFFICE/OUTPT VISIT, EST, LEVL III, 20-29 MIN: ICD-10-PCS | Mod: 95,,, | Performed by: INTERNAL MEDICINE

## 2022-03-20 NOTE — PROGRESS NOTES
Telemedicine Video Visit    The patient location is:  [place], [place]  The chief complaint leading to consultation is: Obesity  Visit type: Virtual visit with synchronous audio and video  Total time spent with patient: 15 minutes  Each patient to whom he or she provides medical services by telemedicine is:  (1) informed of the relationship between the physician and patient and the respective role of any other health care provider with respect to management of the patient; and (2) notified that he or she may decline to receive medical services by telemedicine and may withdraw from such care at any time.     Obesity, interested in semaglutide.  Cautions and s/e reviewed.  Diet and lifestyle issues discussed.    Patient Active Problem List   Diagnosis    Fibromyalgia    Adjustment disorder with mixed anxiety and depressed mood    Seasonal allergic rhinitis due to pollen    Varicose veins of left lower extremity    Intrauterine device    Chronic deep vein thrombosis (DVT) of popliteal vein of left lower extremity    COVID-19 virus infection: dx 4/7/20    Severe obesity (BMI 35.0-35.9 with comorbidity)    Snoring    VIOLETTA (obstructive sleep apnea)    History of DVT (deep vein thrombosis)     Review of Systems   HENT: Negative for hearing loss.    Eyes: Negative for discharge.   Respiratory: Negative for wheezing.    Cardiovascular: Negative for chest pain and palpitations.   Gastrointestinal: Negative for blood in stool, constipation, diarrhea and vomiting.   Genitourinary: Negative for dysuria and hematuria.   Musculoskeletal: Negative for neck pain.   Neurological: Negative for weakness and headaches.   Endo/Heme/Allergies: Negative for polydipsia.     Physical Exam  Constitutional:       Appearance: She is well-developed.   HENT:      Head: Normocephalic and atraumatic.   Eyes:      Extraocular Movements: Extraocular movements intact.      Conjunctiva/sclera: Conjunctivae normal.   Neck:      Thyroid: No  thyromegaly.   Pulmonary:      Effort: No respiratory distress.   Neurological:      General: No focal deficit present.      Mental Status: She is alert and oriented to person, place, and time.   Psychiatric:         Mood and Affect: Mood normal.         Behavior: Behavior normal.         Thought Content: Thought content normal.         Judgment: Judgment normal.     Antonia was seen today for obesity.    Diagnoses and all orders for this visit:    Severe obesity (BMI 35.0-35.9 with comorbidity)  -     semaglutide (OZEMPIC) 0.25 mg or 0.5 mg(2 mg/1.5 mL) pen injector; Inject 0.25 mg into the skin every 7 days.    VIOLETTA (obstructive sleep apnea)    Portion control, diet, exercise reviewed  1 month follow up  VIOLETTA issues again reviewed  VIOLETTA again discussed    Answers for HPI/ROS submitted by the patient on 3/15/2022  activity change: No  unexpected weight change: Yes  rhinorrhea: No  trouble swallowing: No  visual disturbance: No  chest tightness: No  polyuria: No  difficulty urinating: No  menstrual problem: No  joint swelling: No  arthralgias: No  confusion: No  dysphoric mood: No

## 2022-03-23 ENCOUNTER — PATIENT MESSAGE (OUTPATIENT)
Dept: INTERNAL MEDICINE | Facility: CLINIC | Age: 42
End: 2022-03-23
Payer: COMMERCIAL

## 2022-03-24 ENCOUNTER — PATIENT MESSAGE (OUTPATIENT)
Dept: ENDOCRINOLOGY | Facility: CLINIC | Age: 42
End: 2022-03-24

## 2022-03-24 ENCOUNTER — OFFICE VISIT (OUTPATIENT)
Dept: ENDOCRINOLOGY | Facility: CLINIC | Age: 42
End: 2022-03-24
Payer: COMMERCIAL

## 2022-03-24 ENCOUNTER — LAB VISIT (OUTPATIENT)
Dept: LAB | Facility: HOSPITAL | Age: 42
End: 2022-03-24
Attending: INTERNAL MEDICINE
Payer: COMMERCIAL

## 2022-03-24 VITALS
OXYGEN SATURATION: 97 % | HEIGHT: 68 IN | WEIGHT: 243.06 LBS | BODY MASS INDEX: 36.84 KG/M2 | HEART RATE: 67 BPM | SYSTOLIC BLOOD PRESSURE: 124 MMHG | DIASTOLIC BLOOD PRESSURE: 78 MMHG

## 2022-03-24 DIAGNOSIS — E04.2 MULTIPLE THYROID NODULES: ICD-10-CM

## 2022-03-24 DIAGNOSIS — E21.3 HYPERPARATHYROIDISM: ICD-10-CM

## 2022-03-24 DIAGNOSIS — E04.2 MULTIPLE THYROID NODULES: Primary | ICD-10-CM

## 2022-03-24 DIAGNOSIS — H93.12 TINNITUS OF LEFT EAR: ICD-10-CM

## 2022-03-24 LAB
25(OH)D3+25(OH)D2 SERPL-MCNC: 33 NG/ML (ref 30–96)
ALBUMIN SERPL BCP-MCNC: 4.2 G/DL (ref 3.5–5.2)
ANION GAP SERPL CALC-SCNC: 6 MMOL/L (ref 8–16)
BUN SERPL-MCNC: 9 MG/DL (ref 6–20)
CALCIUM SERPL-MCNC: 10.1 MG/DL (ref 8.7–10.5)
CHLORIDE SERPL-SCNC: 102 MMOL/L (ref 95–110)
CO2 SERPL-SCNC: 30 MMOL/L (ref 23–29)
CREAT SERPL-MCNC: 0.8 MG/DL (ref 0.5–1.4)
EST. GFR  (AFRICAN AMERICAN): >60 ML/MIN/1.73 M^2
EST. GFR  (NON AFRICAN AMERICAN): >60 ML/MIN/1.73 M^2
GLUCOSE SERPL-MCNC: 82 MG/DL (ref 70–110)
PHOSPHATE SERPL-MCNC: 3 MG/DL (ref 2.7–4.5)
POTASSIUM SERPL-SCNC: 3.9 MMOL/L (ref 3.5–5.1)
PTH-INTACT SERPL-MCNC: 77.8 PG/ML (ref 9–77)
SODIUM SERPL-SCNC: 138 MMOL/L (ref 136–145)

## 2022-03-24 PROCEDURE — 83970 ASSAY OF PARATHORMONE: CPT | Performed by: INTERNAL MEDICINE

## 2022-03-24 PROCEDURE — 99204 OFFICE O/P NEW MOD 45 MIN: CPT | Mod: S$GLB,,, | Performed by: INTERNAL MEDICINE

## 2022-03-24 PROCEDURE — 82306 VITAMIN D 25 HYDROXY: CPT | Performed by: INTERNAL MEDICINE

## 2022-03-24 PROCEDURE — 99204 PR OFFICE/OUTPT VISIT, NEW, LEVL IV, 45-59 MIN: ICD-10-PCS | Mod: S$GLB,,, | Performed by: INTERNAL MEDICINE

## 2022-03-24 PROCEDURE — 80069 RENAL FUNCTION PANEL: CPT | Performed by: INTERNAL MEDICINE

## 2022-03-24 PROCEDURE — 99999 PR PBB SHADOW E&M-EST. PATIENT-LVL IV: ICD-10-PCS | Mod: PBBFAC,,, | Performed by: INTERNAL MEDICINE

## 2022-03-24 PROCEDURE — 99999 PR PBB SHADOW E&M-EST. PATIENT-LVL IV: CPT | Mod: PBBFAC,,, | Performed by: INTERNAL MEDICINE

## 2022-03-24 PROCEDURE — 36415 COLL VENOUS BLD VENIPUNCTURE: CPT | Performed by: INTERNAL MEDICINE

## 2022-03-24 NOTE — ASSESSMENT & PLAN NOTE
--Patient found to have thyroid nodules  --No risk factors for thyroid cancer  --No compressive symptoms  --TSH WNL  --Independently reviewed ultrasound images with the patient  --Has nodule(s) that meets FNA criteria  --Discussed FNA procedure in detail with the patient and possible cytology outcomes including those that may necessitate repeat FNA (I.e. FLUS, non-diagnostic)  --Discussed indications for surgery  --Will proceed with FNA of lobe nodule

## 2022-03-24 NOTE — PROGRESS NOTES
"  Subjective:      Patient ID: Antonia Jeffers is a 41 y.o. female.    Chief Complaint:  Thyroid Nodule      History of Present Illness  Ms. Jeffers presents for evaluation and management of thyroid nodules.     Has active history of VIOLETTA and fibromyalgia. Has history of DVT.    First noted to have thyroid nodules on neck ultrasound from 3/2022.    14 years ago episode of kidney stone, but none since. No fractures noted.    No fevers chills nausea vomiting.    Denies heat and cold intolerance.     The patient is currently not taking vitamin D supplementation    Denies pain but does endorse pressure in the LAD region- first noticed on [place] day, does think it has enlarged    Does endorse tinnitus Denies CN 7 weakness     Newly on Ozempic, took second injection last night     Endorses hair loss    Great Aunt and Mother both had cancer. Diffuse Large B Cell lymphoma for Great Aunt. Mother also had cancer, not sure what kind.     The patient is more concerned about the LAD given that history, the thyroid findings were explained.     Patient states that she subjectively feels like the lymph node is enlarged.     The patient states the "cancer was in her cheek area and wrapped around her ear drum"    Grandmother had brain cancer.     Sister Steven has hypothyroidism    No personal history of head or neck irradiation.     Latest Reference Range & Units 03/07/22 08:55   TSH 0.400 - 4.000 uIU/mL 1.903     Denies hoarseness or orthopnea.  Denies weight changes, heat/cold intolerance, diarrhea/constipation, dry skin, palpitations, tremors or overt fatigue.    Previous thyroid FNA results:  None    Most recent thyroid USS dated 3/2022:  Thyroid gland is symmetric in size.  Right lobe measures 4.8 x 1.5 x 1.8 cm.  Left lobe measures 4.7 x 1.6 x 1.4 cm.  Thyroid isthmus measures 0.3 cm in AP dimension.     Single subcentimeter right-sided nodule not requiring continued surveillance.     0.6 cm hyperechoic nodule inferior left " thyroid not requiring continued surveillance.  Of note, parathyroid adenoma could have a similar appearance.     Cervical lymph nodes demonstrate normal size and morphology.  Normal right pre-auricular lymph node.  Normal left pre-auricular lymph node corresponding to region of palpable concern.     Impression:     Subcentimeter thyroid nodules not requiring continued surveillance per ACR TI-RADS criteria.  Of note, parathyroid adenoma could have a similar appearance to the 0.6 cm left-sided thyroid nodule.  Clinical correlation as clinically indicated.     Normal left preauricular lymph node.      Review of Systems   Constitutional: Negative for chills and fever.   HENT: Positive for tinnitus and trouble swallowing. Negative for congestion.    Respiratory: Negative for shortness of breath and stridor.    Endocrine: Negative for cold intolerance, heat intolerance, polydipsia, polyphagia and polyuria.   Skin: Negative for color change, pallor and rash.       Objective:   Physical Exam  Vitals and nursing note reviewed.   Constitutional:       Appearance: Normal appearance.   Neck:      Thyroid: No thyromegaly or thyroid tenderness.      Trachea: Trachea and phonation normal.      Comments: Pre-auricular LAD noted, clinically not concerning on exam    Thyroid Palpated, No discrete masses palpable but previous nodules/ultrasonagraphy findings are correlated with on exam  Neurological:      Mental Status: She is alert.       BP Readings from Last 3 Encounters:   03/24/22 124/78   03/07/22 118/65   01/20/22 115/83     Wt Readings from Last 1 Encounters:   03/24/22 0748 110.2 kg (243 lb 0.9 oz)       Body mass index is 36.96 kg/m².    Lab Review:   Lab Results   Component Value Date    HGBA1C 5.5 08/26/2021     Lab Results   Component Value Date    CHOL 176 04/21/2021    HDL 65 04/21/2021    LDLCALC 91.2 04/21/2021    TRIG 99 04/21/2021    CHOLHDL 36.9 04/21/2021     Lab Results   Component Value Date     03/24/2022     K 3.9 03/24/2022     03/24/2022    CO2 30 (H) 03/24/2022    GLU 82 03/24/2022    BUN 9 03/24/2022    CREATININE 0.8 03/24/2022    CALCIUM 10.1 03/24/2022    PROT 7.3 03/07/2022    ALBUMIN 4.2 03/24/2022    BILITOT 0.4 03/07/2022    ALKPHOS 50 (L) 03/07/2022    AST 25 03/07/2022    ALT 35 03/07/2022    ANIONGAP 6 (L) 03/24/2022    ESTGFRAFRICA >60.0 03/24/2022    EGFRNONAA >60.0 03/24/2022    TSH 1.903 03/07/2022         Assessment and Plan   The patient is a 41 year old F with a history of kidney stone 14 years prior and L preauricular LAD and Tinnitus found to have at this time clinically insignificant thryoid nodules versus thyroid nodule and parathyroid adenoma on ultrasound.        Multiple thyroid nodules  --Patient found to have thyroid nodules  --No risk factors for thyroid cancer  --No compressive symptoms  --TSH WNL  --Independently reviewed ultrasound images with the patient  --Discussed indications for surgery  -- Discussed possibility of patient having a parathyroid adenoma, discussed the workup and possible actions to be taken if workup is Positive including a DEXA scan and 24 hr urine for calcium and creatinine  --PTH, Renal function Panel, and Vitamin D Level to be obtained today   --Will continue to follow nodules sonographically over time to evaluate for any changes or concerning features  --Discussed plans for follow up and communications via [employer] for clinically significant lab work results  Tinnitus  --Patient referred to ENT for evaluation of LAD as well as Tinnitus of L Ear given strong self reported oncology history involving similar problems in mother      Orlando Frankel MD Resident      I have reviewed and concur with the resident's history, physical, assessment, and plan.  I have personally interviewed and examined the patient at bedside.  Lang Reddy M.D. Staff Endocrinology

## 2022-03-25 ENCOUNTER — PATIENT MESSAGE (OUTPATIENT)
Dept: ENDOCRINOLOGY | Facility: CLINIC | Age: 42
End: 2022-03-25
Payer: COMMERCIAL

## 2022-03-25 PROBLEM — E21.3 HYPERPARATHYROIDISM: Status: ACTIVE | Noted: 2022-03-25

## 2022-03-31 ENCOUNTER — PATIENT MESSAGE (OUTPATIENT)
Dept: INTERNAL MEDICINE | Facility: CLINIC | Age: 42
End: 2022-03-31
Payer: COMMERCIAL

## 2022-04-04 ENCOUNTER — PATIENT MESSAGE (OUTPATIENT)
Dept: INTERNAL MEDICINE | Facility: CLINIC | Age: 42
End: 2022-04-04
Payer: COMMERCIAL

## 2022-04-04 NOTE — TELEPHONE ENCOUNTER
Okay to schedule for another video visit 1 month after she started the semaglutide    I think that would be around the 19th thanks

## 2022-04-06 ENCOUNTER — PATIENT MESSAGE (OUTPATIENT)
Dept: OTOLARYNGOLOGY | Facility: CLINIC | Age: 42
End: 2022-04-06

## 2022-04-06 ENCOUNTER — OFFICE VISIT (OUTPATIENT)
Dept: OTOLARYNGOLOGY | Facility: CLINIC | Age: 42
End: 2022-04-06
Payer: COMMERCIAL

## 2022-04-06 VITALS — SYSTOLIC BLOOD PRESSURE: 114 MMHG | HEART RATE: 69 BPM | DIASTOLIC BLOOD PRESSURE: 73 MMHG

## 2022-04-06 DIAGNOSIS — R59.9 LYMPH NODE ENLARGEMENT: Primary | ICD-10-CM

## 2022-04-06 DIAGNOSIS — H92.12 DRAINAGE FROM LEFT EAR: ICD-10-CM

## 2022-04-06 PROCEDURE — 99999 PR PBB SHADOW E&M-EST. PATIENT-LVL III: CPT | Mod: PBBFAC,,, | Performed by: OTOLARYNGOLOGY

## 2022-04-06 PROCEDURE — 99204 PR OFFICE/OUTPT VISIT, NEW, LEVL IV, 45-59 MIN: ICD-10-PCS | Mod: S$GLB,,, | Performed by: OTOLARYNGOLOGY

## 2022-04-06 PROCEDURE — 99999 PR PBB SHADOW E&M-EST. PATIENT-LVL III: ICD-10-PCS | Mod: PBBFAC,,, | Performed by: OTOLARYNGOLOGY

## 2022-04-06 PROCEDURE — 99204 OFFICE O/P NEW MOD 45 MIN: CPT | Mod: S$GLB,,, | Performed by: OTOLARYNGOLOGY

## 2022-04-06 NOTE — PATIENT INSTRUCTIONS
Reviewed history and ultrasound findings and images.     Recommend discontinue use of q-tips to avoid further thinning of ear canal skin and irritation.     Lymph node on left w/o concerning features or size on ultrasound and by exam. However, given concern and family history, image guided fine needle aspiration biopsy ordered of left preauricular lymph node.  Plan will depend on results.    If biopsy results warrant will refer to Dr. Carrington or Dr. Dejesus for further work up.    Patient's questions were answered. Follow up for any questions or concerns.

## 2022-04-06 NOTE — PROGRESS NOTES
41 y/o female presents by referral of Dr. Reddy for evaluation of ear and mass. Patient noted mass in front of left ear on [place] day. Feels it has enlarged a bit since then. No fever, chills, night sweats, unexplained weight loss. Concerned because mass reminds her of her mother's lump/ mass which turned out to be cancer (lymphoma?) - had surgery and was diagnosed about 13-14 yrs ago. Another family member also had lymphoma. Has admitted to some moisture in left ear - not actively draining. Uses qtips to dry the ear. Nothing drains out. Ear rings intermittently. No noticeable hearing change. No other masses or lumps. No skin changes.     Ultrasound showed small subcm nodules v. Possible PT adenoma. Dr. Reddy evaluating.     PMHx, PSHx, Meds, Allergies, SocHx, FamHx reviewed in Epic. Recently started on Ozempic after LN noted. Uses Flonase and Singulair PRN.    Review of Systems     Constitutional: Positive for fatigue.  Negative for fever.      HENT: Positive for ear discharge, ear pain, facial swelling, nosebleeds, postnasal drip, sinus pressure and trouble swallowing.  Negative for hearing loss, runny nose, sinus infection, stuffy nose and voice change.      Eyes:  Positive for eye itching. Negative for eye drainage.     Respiratory:  Positive for sleep apnea. Negative for cough, shortness of breath and wheezing.      Cardiovascular:  Negative for chest pain, foot swelling, irregular heartbeat and swollen veins.     Gastrointestinal:  Positive for diarrhea. Negative for abdominal pain, acid reflux and heartburn.     Genitourinary: Negative for difficulty urinating, sexual problems and frequent urination.     Musc: Positive for aching muscles and back pain. Negative for aching joints.     Skin: Positive for rash.     Allergy: Positive for seasonal allergies.     Endocrine: Positive for cold intolerance.     Neurological: Negative for dizziness, headaches, light-headedness, seizures and tremors.   "    Hematologic: Negative for bruises/bleeds easily and swollen glands.      Psychiatric: Positive for nervous/anxious. Negative for depression.            PE: /73   Pulse 69    Gen: female, well nourished, well developed, NAD, cooperative, good historian  Ears:  EAC clear devoid of cerumen, left EAC w/o erythema but more thin skin & TM translucent with normal bony landmarks bilaterally, no pain with ear movement  Nose: external nose wnl, nasal septum near midline anteriorly, inferior turbinates mild edema, no visible purulence or polyps  OC/OP:  MMM, thick tongue protrudes midline, palate raises symmetrically, tonsils small and symmetric, no erythema or exudate, Breana's and Tova's duct normal in appearance with clear drainage.   Neck: supple, no TTP, no LAD or masses  Face:  no TTP, left preauricular node over masseter with approximate 4-5 x 9 mm LN - no induration, node is mobile, no erythema or flushing  Respiratory: Breathing comfortably without retractions  Skin: facial skin intact without visible lesions or flushing  Lymph: no neck lympadenopathy  Neuro:  facial movement symmetric, speech fluid, gait stable, tongue protrudes midline  Psych: alert & oriented x 3, reasonable, normal affect    Ultrasound 3/7/22 of neck soft tissue:  "FINDINGS:  Thyroid gland is symmetric in size.  Right lobe measures 4.8 x 1.5 x 1.8 cm.  Left lobe measures 4.7 x 1.6 x 1.4 cm.  Thyroid isthmus measures 0.3 cm in AP dimension.     Single subcentimeter right-sided nodule not requiring continued surveillance.     0.6 cm hyperechoic nodule inferior left thyroid not requiring continued surveillance.  Of note, parathyroid adenoma could have a similar appearance.     Cervical lymph nodes demonstrate normal size and morphology.  Normal right pre-auricular lymph node.  Normal left pre-auricular lymph node corresponding to region of palpable concern.     Impression:     Subcentimeter thyroid nodules not requiring continued " "surveillance per ACR TI-RADS criteria.  Of note, parathyroid adenoma could have a similar appearance to the 0.6 cm left-sided thyroid nodule.  Clinical correlation as clinically indicated.     Normal left preauricular lymph node."    Impression:   1. Lymph node enlargement  US FNA Biopsy w/ Imaging 1st Lesion    left preauricular   2. Drainage from left ear  Ambulatory referral/consult to ENT    damp ear       Discussion and Plan:       Reviewed history and ultrasound findings and images.     Recommend discontinue use of q-tips to avoid further thinning of ear canal skin and irritation which is likely part of cause of ear dampness feeling. Explained how to dry the ear canal w/o irritation.    L pre auricular lymph node w/o concerning features or size on ultrasound and by exam. However, given patient concern and family history, image guided fine needle aspiration biopsy ordered of left preauricular lymph node.  Plan will depend on results.    If biopsy results warrant will refer to Dr. Carrington or Dr. Dejesus for further work up.    Patient's questions were answered. Follow up for any questions or concerns.      Parts or all of this note were created by voice recognition software; typographical errors in translating may be present.        "

## 2022-04-07 ENCOUNTER — PATIENT MESSAGE (OUTPATIENT)
Dept: INTERNAL MEDICINE | Facility: CLINIC | Age: 42
End: 2022-04-07
Payer: COMMERCIAL

## 2022-04-07 ENCOUNTER — PATIENT MESSAGE (OUTPATIENT)
Dept: ENDOCRINOLOGY | Facility: CLINIC | Age: 42
End: 2022-04-07
Payer: COMMERCIAL

## 2022-04-08 ENCOUNTER — PATIENT MESSAGE (OUTPATIENT)
Dept: OTOLARYNGOLOGY | Facility: CLINIC | Age: 42
End: 2022-04-08
Payer: COMMERCIAL

## 2022-04-08 ENCOUNTER — PATIENT MESSAGE (OUTPATIENT)
Dept: ENDOCRINOLOGY | Facility: CLINIC | Age: 42
End: 2022-04-08
Payer: COMMERCIAL

## 2022-04-08 ENCOUNTER — HOSPITAL ENCOUNTER (OUTPATIENT)
Dept: RADIOLOGY | Facility: CLINIC | Age: 42
Discharge: HOME | End: 2022-04-08
Attending: INTERNAL MEDICINE
Payer: COMMERCIAL

## 2022-04-08 DIAGNOSIS — E21.3 HYPERPARATHYROIDISM: ICD-10-CM

## 2022-04-08 DIAGNOSIS — R59.9 LYMPH NODE ENLARGEMENT: Primary | ICD-10-CM

## 2022-04-08 PROCEDURE — 77080 DXA BONE DENSITY AXIAL: CPT | Mod: TC,PO

## 2022-04-08 PROCEDURE — 77080 DXA BONE DENSITY AXIAL: CPT | Mod: 26,,, | Performed by: INTERNAL MEDICINE

## 2022-04-08 PROCEDURE — 77080 DEXA BONE DENSITY SPINE HIP: ICD-10-PCS | Mod: 26,,, | Performed by: INTERNAL MEDICINE

## 2022-04-08 PROCEDURE — 77081 DXA BONE DENSITY APPENDICULR: CPT | Mod: 59,TC,PO

## 2022-04-08 NOTE — TELEPHONE ENCOUNTER
I phoned patient and reviewed the messages about the radiologist not recommending the biopsy based on the node size/ criteria. I reassured the patient. I validated her concerns especially given family history and reviewed typical course of reactive lymph node changes. She will follow up with me in 6 weeks and an repeat ultrasound of that area is ordered for her to schedule 6 months from the last ultrasound. She knows to call for any concerns or if she notices any significant changes.

## 2022-04-14 ENCOUNTER — PATIENT MESSAGE (OUTPATIENT)
Dept: ENDOCRINOLOGY | Facility: CLINIC | Age: 42
End: 2022-04-14
Payer: COMMERCIAL

## 2022-04-14 DIAGNOSIS — E21.3 HYPERPARATHYROIDISM: Primary | ICD-10-CM

## 2022-04-15 ENCOUNTER — PATIENT MESSAGE (OUTPATIENT)
Dept: INTERNAL MEDICINE | Facility: CLINIC | Age: 42
End: 2022-04-15
Payer: COMMERCIAL

## 2022-04-19 ENCOUNTER — PATIENT MESSAGE (OUTPATIENT)
Dept: INTERNAL MEDICINE | Facility: CLINIC | Age: 42
End: 2022-04-19

## 2022-04-19 ENCOUNTER — OFFICE VISIT (OUTPATIENT)
Dept: INTERNAL MEDICINE | Facility: CLINIC | Age: 42
End: 2022-04-19
Payer: COMMERCIAL

## 2022-04-19 VITALS — WEIGHT: 242 LBS | BODY MASS INDEX: 36.68 KG/M2 | HEIGHT: 68 IN

## 2022-04-19 DIAGNOSIS — G47.33 OSA (OBSTRUCTIVE SLEEP APNEA): ICD-10-CM

## 2022-04-19 DIAGNOSIS — E66.01 SEVERE OBESITY (BMI 35.0-35.9 WITH COMORBIDITY): Primary | ICD-10-CM

## 2022-04-19 PROCEDURE — 99213 PR OFFICE/OUTPT VISIT, EST, LEVL III, 20-29 MIN: ICD-10-PCS | Mod: 95,,, | Performed by: INTERNAL MEDICINE

## 2022-04-19 PROCEDURE — 99213 OFFICE O/P EST LOW 20 MIN: CPT | Mod: 95,,, | Performed by: INTERNAL MEDICINE

## 2022-04-19 NOTE — PROGRESS NOTES
Telemedicine Video Visit    The patient location is:  Patient Home   The chief complaint leading to consultation is: Obesity  Visit type: Virtual visit with synchronous audio and video  Total time spent with patient: 15 minutes  Each patient to whom he or she provides medical services by telemedicine is:  (1) informed of the relationship between the physician and patient and the respective role of any other health care provider with respect to management of the patient; and (2) notified that he or she may decline to receive medical services by telemedicine and may withdraw from such care at any time.     Obesity, on semaglutide, .25 mg weekly.  Initially, it seemed like it was working but she finds it wears off after 2-3 days and unfortunately she has gained a few pounds.  We discussed increasing the dose up to .5 and then up to 1 mg fairly quickly.  She is really not having any side effects.    Sleep apnea reviewed, she has not been using her CPAP.  She has difficulty tolerating it.  Discussed that it may be very difficult for her to lose weight with untreated sleep apnea and I have recommended follow-up in the [facility] and [facility]NT; she is ambivalent currently but may consider in the future.    Other labs recently acceptable including thyroid.    Patient Active Problem List   Diagnosis    Fibromyalgia    Adjustment disorder with mixed anxiety and depressed mood    Seasonal allergic rhinitis due to pollen    Varicose veins of left lower extremity    Intrauterine device    Chronic deep vein thrombosis (DVT) of popliteal vein of left lower extremity    COVID-19 virus infection: dx 4/7/20    Severe obesity (BMI 35.0-35.9 with comorbidity)    Snoring    VIOLETTA (obstructive sleep apnea)    History of DVT (deep vein thrombosis)    Multiple thyroid nodules    Tinnitus of left ear    Hyperparathyroidism         Review of Systems   Constitutional:        See above   HENT: Negative for hearing loss.    Eyes:  Negative for discharge.   Respiratory: Negative for wheezing.    Cardiovascular: Negative for chest pain and palpitations.   Gastrointestinal: Negative for blood in stool, constipation, diarrhea and vomiting.   Genitourinary: Negative for dysuria and hematuria.   Musculoskeletal: Negative for neck pain.   Neurological: Negative for weakness and headaches.   Endo/Heme/Allergies: Negative for polydipsia.     Physical Exam  Constitutional:       Appearance: She is well-developed.   HENT:      Head: Normocephalic and atraumatic.   Eyes:      Extraocular Movements: Extraocular movements intact.      Conjunctiva/sclera: Conjunctivae normal.   Neck:      Thyroid: No thyromegaly.   Pulmonary:      Effort: No respiratory distress.   Neurological:      General: No focal deficit present.      Mental Status: She is alert and oriented to person, place, and time.   Psychiatric:         Mood and Affect: Mood normal.         Behavior: Behavior normal.         Thought Content: Thought content normal.         Judgment: Judgment normal.     Antonia was seen today for obesity and sleep apnea.    Diagnoses and all orders for this visit:    Severe obesity (BMI 35.0-35.9 with comorbidity)    VIOLETTA (obstructive sleep apnea)    Other orders  -     semaglutide (OZEMPIC) 1 mg/dose (4 mg/3 mL); Inject 1 mg into the skin every 7 days.    Increased semaglutide dose as above, cautions and side effects reviewed  Portion control, exercise recommendations  Consider dietitian or medical bariatric  Sleep apnea issues reviewed at length, I think this is very significant and will need to be readdressed; she currently declines  Follow-up in 2 weeks with progress, sooner with problems  Answers for HPI/ROS submitted by the patient on 4/18/2022  activity change: No  unexpected weight change: No  rhinorrhea: No  trouble swallowing: No  visual disturbance: No  chest tightness: No  polyuria: No  difficulty urinating: No  menstrual problem: No  joint swelling:  Yes  arthralgias: Yes  confusion: No  dysphoric mood: No

## 2022-04-25 ENCOUNTER — OFFICE VISIT (OUTPATIENT)
Dept: INTERNAL MEDICINE | Facility: CLINIC | Age: 42
End: 2022-04-25
Payer: COMMERCIAL

## 2022-04-25 DIAGNOSIS — J06.9 URTI (ACUTE UPPER RESPIRATORY INFECTION): Primary | ICD-10-CM

## 2022-04-25 PROCEDURE — 99214 PR OFFICE/OUTPT VISIT, EST, LEVL IV, 30-39 MIN: ICD-10-PCS | Mod: 95,,, | Performed by: NURSE PRACTITIONER

## 2022-04-25 PROCEDURE — 99214 OFFICE O/P EST MOD 30 MIN: CPT | Mod: 95,,, | Performed by: NURSE PRACTITIONER

## 2022-04-25 RX ORDER — METHYLPREDNISOLONE 4 MG/1
TABLET ORAL
Qty: 21 EACH | Refills: 0 | Status: SHIPPED | OUTPATIENT
Start: 2022-04-25 | End: 2022-05-16

## 2022-04-25 RX ORDER — AMOXICILLIN 500 MG/1
500 TABLET, FILM COATED ORAL EVERY 12 HOURS
Qty: 14 TABLET | Refills: 0 | Status: SHIPPED | OUTPATIENT
Start: 2022-04-25 | End: 2022-05-02

## 2022-04-25 NOTE — PROGRESS NOTES
Subjective:    The patient location is: [place]   The chief complaint leading to consultation is: sinus    Visit type: audiovisual    Face to Face time with patient: 15  30 minutes of total time spent on the encounter, which includes face to face time and non-face to face time preparing to see the patient (eg, review of tests), Obtaining and/or reviewing separately obtained history, Documenting clinical information in the electronic or other health record, Independently interpreting results (not separately reported) and communicating results to the patient/family/caregiver, or Care coordination (not separately reported).         Each patient to whom he or she provides medical services by telemedicine is:  (1) informed of the relationship between the physician and patient and the respective role of any other health care provider with respect to management of the patient; and (2) notified that he or she may decline to receive medical services by telemedicine and may withdraw from such care at any time.    Notes:    ID: Antonia Jeffers is a 42 y.o. female.    Chief Complaint: No chief complaint on file.    HPI     This is a 43 y/o female patient of Dr. Diallo who is new to me and presents with c/o head congestion, sinus pressure, PND, sneezing, feels tightness in her chest with coughing, noticing brown/green phlegm, rhinorrhea. No c/o fever, chills, sob cardiac chest pain, n/v/d.   Did home covid test and was negative  Pt was in the long elan monitoring program with [facility]- uses flonase and singular with flares.     Review of Systems   Constitutional: Negative for activity change, chills, fever and unexpected weight change.   HENT: Positive for nasal congestion, postnasal drip, rhinorrhea, sinus pressure/congestion and sneezing. Negative for ear pain, hearing loss and trouble swallowing.    Eyes: Negative for discharge and visual disturbance.   Respiratory: Negative for chest tightness, shortness of breath and  wheezing.    Cardiovascular: Negative for chest pain and palpitations.   Gastrointestinal: Negative for blood in stool, constipation, diarrhea, nausea and vomiting.   Endocrine: Negative for polydipsia and polyuria.   Genitourinary: Negative for difficulty urinating, dysuria, hematuria and menstrual problem.   Musculoskeletal: Negative for arthralgias, joint swelling and neck pain.   Neurological: Positive for headaches. Negative for dizziness and weakness.   Psychiatric/Behavioral: Negative for confusion and dysphoric mood.         Objective:      Physical Exam  Constitutional:       General: She is not in acute distress.     Appearance: Normal appearance. She is ill-appearing.   HENT:      Head: Normocephalic and atraumatic.      Comments: Pt sounds very congestion in her head     Nose:      Comments: Redness to bilateral nares noted from blowing her nose  Cardiovascular:      Comments: No s/s of distress noted  Pulmonary:      Effort: Pulmonary effort is normal. No respiratory distress.   Skin:     General: Skin is dry.      Comments: No flushing noted   Neurological:      Mental Status: She is alert and oriented to person, place, and time.   Psychiatric:         Mood and Affect: Mood normal.         Behavior: Behavior normal.         Thought Content: Thought content normal.         Judgment: Judgment normal.         Assessment:       Problem List Items Addressed This Visit    None     Visit Diagnoses     URTI (acute upper respiratory infection)    -  Primary    Relevant Medications    methylPREDNISolone (MEDROL DOSEPACK) 4 mg tablet    amoxicillin (AMOXIL) 500 MG Tab          Plan:     stay hydrated with water  Gargle with warm  Salt water as needed  Continue flonase and singular at this time  Monitor symptoms  Take meds as directed  Complete anbx  F/u in clinic if no relief

## 2022-04-27 ENCOUNTER — PATIENT MESSAGE (OUTPATIENT)
Dept: INTERNAL MEDICINE | Facility: CLINIC | Age: 42
End: 2022-04-27
Payer: COMMERCIAL

## 2022-04-27 DIAGNOSIS — T36.95XA ANTIBIOTIC-INDUCED YEAST INFECTION: Primary | ICD-10-CM

## 2022-04-27 DIAGNOSIS — B37.9 ANTIBIOTIC-INDUCED YEAST INFECTION: Primary | ICD-10-CM

## 2022-04-28 ENCOUNTER — PATIENT MESSAGE (OUTPATIENT)
Dept: INTERNAL MEDICINE | Facility: CLINIC | Age: 42
End: 2022-04-28
Payer: COMMERCIAL

## 2022-04-28 RX ORDER — FLUCONAZOLE 150 MG/1
150 TABLET ORAL DAILY
Qty: 1 TABLET | Refills: 0 | Status: SHIPPED | OUTPATIENT
Start: 2022-04-28 | End: 2022-04-29

## 2022-05-18 ENCOUNTER — OFFICE VISIT (OUTPATIENT)
Dept: OTOLARYNGOLOGY | Facility: CLINIC | Age: 42
End: 2022-05-18
Payer: COMMERCIAL

## 2022-05-18 VITALS — BODY MASS INDEX: 36.2 KG/M2 | WEIGHT: 238.13 LBS | DIASTOLIC BLOOD PRESSURE: 72 MMHG | SYSTOLIC BLOOD PRESSURE: 116 MMHG

## 2022-05-18 DIAGNOSIS — R59.9 PALPABLE LYMPH NODE: ICD-10-CM

## 2022-05-18 DIAGNOSIS — H60.8X2 CHRONIC REACTIVE OTITIS EXTERNA OF LEFT EAR: ICD-10-CM

## 2022-05-18 DIAGNOSIS — M62.9: ICD-10-CM

## 2022-05-18 DIAGNOSIS — H92.12 DRAINAGE FROM LEFT EAR: Primary | ICD-10-CM

## 2022-05-18 PROCEDURE — 99213 OFFICE O/P EST LOW 20 MIN: CPT | Mod: S$GLB,,, | Performed by: OTOLARYNGOLOGY

## 2022-05-18 PROCEDURE — 99999 PR PBB SHADOW E&M-EST. PATIENT-LVL III: ICD-10-PCS | Mod: PBBFAC,,, | Performed by: OTOLARYNGOLOGY

## 2022-05-18 PROCEDURE — 99213 PR OFFICE/OUTPT VISIT, EST, LEVL III, 20-29 MIN: ICD-10-PCS | Mod: S$GLB,,, | Performed by: OTOLARYNGOLOGY

## 2022-05-18 PROCEDURE — 99999 PR PBB SHADOW E&M-EST. PATIENT-LVL III: CPT | Mod: PBBFAC,,, | Performed by: OTOLARYNGOLOGY

## 2022-05-18 RX ORDER — FLUOCINOLONE ACETONIDE 0.11 MG/ML
5 OIL AURICULAR (OTIC) 2 TIMES DAILY
Qty: 20 ML | Refills: 1 | Status: SHIPPED | OUTPATIENT
Start: 2022-05-18 | End: 2022-05-25

## 2022-05-18 NOTE — PROGRESS NOTES
"41 y/o female here for follow up originally seen 4/6/22 by referral of Dr. Reddy for evaluation of ear and mass.     Re: ear - still wakes up daily with wet feeling in left ear. Two q-tips nearly daily to dry it out. Not a lot of itching. No drainage. Feels sticky. Some pressure feeling. No prior surgery.    Re: lymph node in front of left ear - still feels something there. More noticeable when opens jaw. Maybe a bit larger. No other masses or lesions. No other ENT changes.  Not sure if clenches teeth. No acute pain but pressure in area.    Given concern at last visit -  u/s guided bx had been ordered but radiologist did not feel possible due to size.  Discussed with patient and observation plan instituted after last visit.      4/6/22 HPI INFO: "Patient noted mass in front of left ear on [place] day. Feels it has enlarged a bit since then. No fever, chills, night sweats, unexplained weight loss. Concerned because mass reminds her of her mother's lump/ mass which turned out to be cancer (lymphoma?) - had surgery and was diagnosed about 13-14 yrs ago. Another family member also had lymphoma. Has admitted to some moisture in left ear - not actively draining. Uses qtips to dry the ear. Nothing drains out. Ear rings intermittently. No noticeable hearing change. No other masses or lumps. No skin changes. "      PMHx, PSHx, Meds, Allergies, SocHx, FamHx reviewed in Epic. Started Ozempic after LN noted but before last visit. Uses allergy meds .    ROS:  Gen - no fever or chills  ENT - as above  Endo - has f/u for parathyroid labs and u/s      PE: /72   Wt 108 kg (238 lb 1.6 oz)   BMI 36.20 kg/m²    Gen: female, well nourished, well developed, NAD, cooperative, good historian  Ears:  EAC clear devoid of cerumen, left EAC w/o erythema but more thin skin & TM translucent with normal bony landmarks bilaterally, no pain with ear movement  Nose: external nose wnl, nasal septum near midline anteriorly, inferior " "turbinates mild edema, no visible purulence or polyps  OC/OP:  MMM, thick tongue protrudes midline, palate raises symmetrically, tonsils small and symmetric, no erythema or exudate, Tyler's and Tova's duct normal in appearance with clear drainage.   Neck: supple, no TTP, no LAD or masses  Face:  no TTP, left preauricular node over masseter with approximate 3 x 5 mm LN - no induration, soft pliable and mobile, similar in size to R, left masseter muscle (tendon?) tightness, no erythema or flushing  Respiratory: Breathing comfortably without retractions  Skin: facial skin intact without visible lesions or flushing  Lymph: no neck lympadenopathy  Neuro:  facial movement symmetric, speech fluid, gait stable, tongue protrudes midline  Psych: alert & oriented x 3, reasonable, normal affect    Ultrasound 3/7/22 of neck soft tissue:  "FINDINGS:  Thyroid gland is symmetric in size.  Right lobe measures 4.8 x 1.5 x 1.8 cm.  Left lobe measures 4.7 x 1.6 x 1.4 cm.  Thyroid isthmus measures 0.3 cm in AP dimension.     Single subcentimeter right-sided nodule not requiring continued surveillance.     0.6 cm hyperechoic nodule inferior left thyroid not requiring continued surveillance.  Of note, parathyroid adenoma could have a similar appearance.     Cervical lymph nodes demonstrate normal size and morphology.  Normal right pre-auricular lymph node.  Normal left pre-auricular lymph node corresponding to region of palpable concern.     Impression:     Subcentimeter thyroid nodules not requiring continued surveillance per ACR TI-RADS criteria.  Of note, parathyroid adenoma could have a similar appearance to the 0.6 cm left-sided thyroid nodule.  Clinical correlation as clinically indicated.     Normal left preauricular lymph node."    Impression:   1. Drainage from left ear     2. Chronic reactive otitis externa of left ear  fluocinolone acetonide oiL 0.01 % Drop   3. Palpable lymph node     4. Musculoskeletal disorder involving " masseter      tightness       Discussion and Plan:       Reviewed findings.    Avoid qtips in ear. Use Dermotic prescription as prescribed in left ear.    No increase in size of left preauricular lymph node. Muscle tendon tension/ tightness anterior to left TMJ - I feel this is making the area more noticeable to patient. Avoid clenching/ grinding. Monitor closely.    F/u in 6-8 weeks to re-assess. Return or contact us if problems arise.      Parts or all of this note were created by voice recognition software; typographical errors in translating may be present.

## 2022-05-18 NOTE — PATIENT INSTRUCTIONS
Reviewed findings.    Avoid qtips in ear. Use Dermotic prescription as prescribed in left ear.    No increase in size of left preauricular lymph node. Muscle tendon tension/ tightness anterior left TMJ. Avoid clenching/ grinding. Monitor closely.    F/u in 6-8 weeks to re-assess. Return or contact us if problems arise.

## 2022-06-10 ENCOUNTER — PATIENT MESSAGE (OUTPATIENT)
Dept: INTERNAL MEDICINE | Facility: CLINIC | Age: 42
End: 2022-06-10

## 2022-06-10 ENCOUNTER — OFFICE VISIT (OUTPATIENT)
Dept: INTERNAL MEDICINE | Facility: CLINIC | Age: 42
End: 2022-06-10
Payer: COMMERCIAL

## 2022-06-10 ENCOUNTER — PATIENT MESSAGE (OUTPATIENT)
Dept: INTERNAL MEDICINE | Facility: CLINIC | Age: 42
End: 2022-06-10
Payer: COMMERCIAL

## 2022-06-10 VITALS — WEIGHT: 232 LBS | BODY MASS INDEX: 35.16 KG/M2 | HEIGHT: 68 IN

## 2022-06-10 DIAGNOSIS — E66.01 SEVERE OBESITY (BMI 35.0-35.9 WITH COMORBIDITY): Primary | ICD-10-CM

## 2022-06-10 PROCEDURE — 99442 PR PHYSICIAN TELEPHONE EVALUATION 11-20 MIN: ICD-10-PCS | Mod: 95,,, | Performed by: INTERNAL MEDICINE

## 2022-06-10 PROCEDURE — 99442 PR PHYSICIAN TELEPHONE EVALUATION 11-20 MIN: CPT | Mod: 95,,, | Performed by: INTERNAL MEDICINE

## 2022-06-10 RX ORDER — SEMAGLUTIDE 1.34 MG/ML
1.7 INJECTION, SOLUTION SUBCUTANEOUS
Qty: 4 PEN | Refills: 10 | Status: SHIPPED | OUTPATIENT
Start: 2022-06-10 | End: 2022-06-14 | Stop reason: SDUPTHER

## 2022-06-10 RX ORDER — TRETINOIN 0.25 MG/G
1 CREAM TOPICAL NIGHTLY
COMMUNITY
Start: 2022-05-23 | End: 2024-03-13

## 2022-06-10 NOTE — PROGRESS NOTES
Established Patient - Audio Only Telehealth Visit     The patient location is: [place], home  The chief complaint leading to consultation is: Obesity  Visit type: Virtual visit with audio only (telephone)  Total time spent with patient: 12 minutes both with patient on phone and in chart review, medication review       The reason for the audio only service rather than synchronous audio and video virtual visit was related to technical difficulties or patient preference/necessity.     Each patient to whom I provide medical services by telemedicine is:  (1) informed of the relationship between the physician and patient and the respective role of any other health care provider with respect to management of the patient; and (2) notified that they may decline to receive medical services by telemedicine and may withdraw from such care at any time. Patient verbally consented to receive this service via voice-only telephone call.    Doing well on the higher dose of ozempic.  Has lost a total of almost 10 # and feels well.  No side effects.  Working on lifestyle modification as well.    Patient Active Problem List   Diagnosis    Fibromyalgia    Adjustment disorder with mixed anxiety and depressed mood    Seasonal allergic rhinitis due to pollen    Varicose veins of left lower extremity    Intrauterine device    Chronic deep vein thrombosis (DVT) of popliteal vein of left lower extremity    COVID-19 virus infection: dx 4/7/20    Severe obesity (BMI 35.0-35.9 with comorbidity)    Snoring    VIOLETTA (obstructive sleep apnea)    History of DVT (deep vein thrombosis)    Multiple thyroid nodules    Tinnitus of left ear    Hyperparathyroidism     Review of Systems   Constitutional: Positive for weight loss.     Physical Exam  Constitutional:       Comments: Limited due to Audio visit   Pulmonary:      Effort: No respiratory distress.      Comments: Speaking in complete sentences, no respiratory distress  Neurological:       Mental Status: She is oriented to person, place, and time.   Psychiatric:         Mood and Affect: Mood normal.         Behavior: Behavior normal.         Thought Content: Thought content normal.         Judgment: Judgment normal.     Antonia was seen today for obesity.    Diagnoses and all orders for this visit:    Severe obesity (BMI 35.0-35.9 with comorbidity)    Other orders  -     semaglutide (OZEMPIC) 1 mg/dose (4 mg/3 mL); Inject 1.7 mg into the skin every 7 days.    Increase dose to 1.7 weekly  Continue with exercise and healthy habits  1 month follow up, sooner with problems prior                   This service was not originating from a related E/M service provided within the previous 7 days nor will  to an E/M service or procedure within the next 24 hours or my soonest available appointment.  Prevailing standard of care was able to be met in this audio-only visit.

## 2022-06-14 ENCOUNTER — PATIENT MESSAGE (OUTPATIENT)
Dept: INTERNAL MEDICINE | Facility: CLINIC | Age: 42
End: 2022-06-14
Payer: COMMERCIAL

## 2022-06-14 RX ORDER — SEMAGLUTIDE 1.34 MG/ML
1.7 INJECTION, SOLUTION SUBCUTANEOUS
Qty: 4 PEN | Refills: 10 | Status: SHIPPED | OUTPATIENT
Start: 2022-06-14 | End: 2022-06-20 | Stop reason: SDUPTHER

## 2022-06-14 NOTE — TELEPHONE ENCOUNTER
Care Due:                  Date            Visit Type   Department     Provider  --------------------------------------------------------------------------------                                VIRTUAL      [facility] INTERNAL  Last Visit: 06-      AUDIO ONLY   MEDICINE       Cherry Matute  Next Visit: None Scheduled  None         None Found                                                            Last  Test          Frequency    Reason                     Performed    Due Date  --------------------------------------------------------------------------------    HBA1C.......  6 months...  semaglutide..............  08- 02-    Health [facility] Embedded Care Gaps. Reference number: [id number]. 6/14/2022   8:09:37 AM CDT

## 2022-06-18 ENCOUNTER — PATIENT MESSAGE (OUTPATIENT)
Dept: INTERNAL MEDICINE | Facility: CLINIC | Age: 42
End: 2022-06-18
Payer: COMMERCIAL

## 2022-06-20 ENCOUNTER — PATIENT MESSAGE (OUTPATIENT)
Dept: INTERNAL MEDICINE | Facility: CLINIC | Age: 42
End: 2022-06-20
Payer: COMMERCIAL

## 2022-06-20 RX ORDER — SEMAGLUTIDE 1.34 MG/ML
1.7 INJECTION, SOLUTION SUBCUTANEOUS
Qty: 4 PEN | Refills: 10 | Status: SHIPPED | OUTPATIENT
Start: 2022-06-20 | End: 2022-06-22

## 2022-06-20 NOTE — TELEPHONE ENCOUNTER
No new care gaps identified.  [facility] Embedded Care Gaps. Reference number: [id number]. 6/20/2022   11:46:08 AM SARAT

## 2022-06-21 ENCOUNTER — PATIENT MESSAGE (OUTPATIENT)
Dept: INTERNAL MEDICINE | Facility: CLINIC | Age: 42
End: 2022-06-21
Payer: COMMERCIAL

## 2022-06-22 RX ORDER — SEMAGLUTIDE 2.68 MG/ML
2 INJECTION, SOLUTION SUBCUTANEOUS
Qty: 3 ML | Refills: 12 | Status: SHIPPED | OUTPATIENT
Start: 2022-06-22 | End: 2022-09-13 | Stop reason: SDUPTHER

## 2022-06-29 ENCOUNTER — OFFICE VISIT (OUTPATIENT)
Dept: OTOLARYNGOLOGY | Facility: CLINIC | Age: 42
End: 2022-06-29
Payer: COMMERCIAL

## 2022-06-29 VITALS — WEIGHT: 231.94 LBS | BODY MASS INDEX: 35.26 KG/M2

## 2022-06-29 DIAGNOSIS — J34.2 NASAL SEPTAL DEVIATION: ICD-10-CM

## 2022-06-29 DIAGNOSIS — J30.2 SEASONAL ALLERGIC RHINITIS, UNSPECIFIED TRIGGER: ICD-10-CM

## 2022-06-29 DIAGNOSIS — H60.8X2 CHRONIC REACTIVE OTITIS EXTERNA OF LEFT EAR: ICD-10-CM

## 2022-06-29 DIAGNOSIS — R59.9 PALPABLE LYMPH NODE: Primary | ICD-10-CM

## 2022-06-29 PROCEDURE — 99213 PR OFFICE/OUTPT VISIT, EST, LEVL III, 20-29 MIN: ICD-10-PCS | Mod: S$GLB,,, | Performed by: OTOLARYNGOLOGY

## 2022-06-29 PROCEDURE — 99213 OFFICE O/P EST LOW 20 MIN: CPT | Mod: S$GLB,,, | Performed by: OTOLARYNGOLOGY

## 2022-06-29 PROCEDURE — 99999 PR PBB SHADOW E&M-EST. PATIENT-LVL III: CPT | Mod: PBBFAC,,, | Performed by: OTOLARYNGOLOGY

## 2022-06-29 PROCEDURE — 99999 PR PBB SHADOW E&M-EST. PATIENT-LVL III: ICD-10-PCS | Mod: PBBFAC,,, | Performed by: OTOLARYNGOLOGY

## 2022-06-29 NOTE — PROGRESS NOTES
41 y/o female here for 2nd follow up after she noticed lump in front of left ear earlier this year. Last follow up in May. Had U/s ordered to eval lump by PCP in March. LN noted anterior pretragal area bilaterally - similar in size. Parathyroid issue noted which is being followed.  Pt still notices left lump - more when opens mouth. No change in size. No other new lumps.    Used derm otic - helped itching. Still using q-tips. Left ear gets moist in mornings.    Some allergy issues the past few weeks. Right side of nose congested in mornings. Some clear mucus, sneezing.  Zyrtec, Xyzal and Claritin make no difference. Has used Flonase in the past but not recently.    Mother with lump years ago - removed - lymphoma.    PMHx, PSHx, Meds, Allergies, SocHx, FamHx reviewed in EPIC    ROS:  Gen: no fever, chills, night sweats  ENT as above    PE: Wt 105.2 kg (231 lb 14.8 oz)   BMI 35.26 kg/m²    Gen: female, well nourished, well developed, NAD, cooperative, good historian  Ears:  EAC clear devoid of cerumen, left EAC w/o erythema but thin skin & TM translucent with normal bony landmarks bilaterally, no pain with ear movement. Few tiny skin flakes medialized  Nose: external nose wnl, nasal septum near midline anteriorly but to right more superior , inferior turbinates mild edema, no visible purulence or polyps  OC/OP:  MMM, thick tongue protrudes midline, palate raises symmetrically, tonsils small and symmetric, no erythema or exudate, Hines's and Tova's duct normal in appearance with clear drainage - small mucosal bite zaira right posterior buccal area and inner lip (pt reports biting inside of mouth while eating candy a day or so ago)  Neck: supple, no TTP, no LAD or masses  Face:  no TTP, left preauricular node over masseter with approximate 3 x 4 mm LN - no induration, soft pliable and mobile, similar in size to R, left masseter muscle (tendon?) tightness, no erythema or flushing, opening TMJ causing more left  protrusion than right  Respiratory: Breathing comfortably without retractions  Skin: facial skin intact without visible lesions or flushing  Lymph: no neck lympadenopathy  Neuro:  facial movement symmetric, speech fluid, gait stable, tongue protrudes midline  Psych: alert & oriented x 3, reasonable, normal affect    Impression:   1. Palpable lymph node     2. Chronic reactive otitis externa of left ear     3. Nasal septal deviation     4. Seasonal allergic rhinitis, unspecified trigger         Discussion and Plan:       Avoid using qtips. Use tissue on finger or hair dryer to get dry moist feeling in left ear canal.    Flonase use and application and benefits discussed.    Ultrasound of left area in front of ear in September (6 months from original ultrasound). Follow up in clinic 1-2 weeks after ultrasound.    Return sooner for any problems or concerns.      Parts or all of this note were created by voice recognition software; typographical errors in translating may be present.

## 2022-06-29 NOTE — PATIENT INSTRUCTIONS
Avoid using qtips. Use tissue on finger or hair dryer to get rid of moist feeling in left ear canal.    Discussed using Flonase 2 sprays each nostril daily. We discussed how to apply it appropriately by placing the spray nozzle inside the nostril pointing above the ear on the same side and gently breathing in through the nose (do NOT snort).    Ultrasound of left area in front of ear in September (6 months from original ultrasound). Follow up in clinic 1-2 weeks after ultrasound. Patient may call [phone] to schedule the imaging.      Return sooner for any problems or concerns.

## 2022-07-03 ENCOUNTER — PATIENT MESSAGE (OUTPATIENT)
Dept: INTERNAL MEDICINE | Facility: CLINIC | Age: 42
End: 2022-07-03
Payer: COMMERCIAL

## 2022-07-05 NOTE — TELEPHONE ENCOUNTER
I could probably do a virtual visit with her tomorrow afternoon if there is time or if not, urgent care is the best option thank you

## 2022-07-22 ENCOUNTER — PATIENT MESSAGE (OUTPATIENT)
Dept: OTOLARYNGOLOGY | Facility: CLINIC | Age: 42
End: 2022-07-22
Payer: COMMERCIAL

## 2022-08-09 ENCOUNTER — PATIENT MESSAGE (OUTPATIENT)
Dept: ENDOCRINOLOGY | Facility: CLINIC | Age: 42
End: 2022-08-09
Payer: COMMERCIAL

## 2022-08-15 ENCOUNTER — LAB VISIT (OUTPATIENT)
Dept: LAB | Facility: HOSPITAL | Age: 42
End: 2022-08-15
Attending: INTERNAL MEDICINE
Payer: COMMERCIAL

## 2022-08-15 DIAGNOSIS — E21.3 HYPERPARATHYROIDISM: ICD-10-CM

## 2022-08-15 LAB
25(OH)D3+25(OH)D2 SERPL-MCNC: 39 NG/ML (ref 30–96)
ALBUMIN SERPL BCP-MCNC: 4.4 G/DL (ref 3.5–5.2)
ANION GAP SERPL CALC-SCNC: 7 MMOL/L (ref 8–16)
BUN SERPL-MCNC: 7 MG/DL (ref 6–20)
CALCIUM SERPL-MCNC: 9.8 MG/DL (ref 8.7–10.5)
CHLORIDE SERPL-SCNC: 106 MMOL/L (ref 95–110)
CO2 SERPL-SCNC: 26 MMOL/L (ref 23–29)
CREAT SERPL-MCNC: 0.8 MG/DL (ref 0.5–1.4)
EST. GFR  (NO RACE VARIABLE): >60 ML/MIN/1.73 M^2
GLUCOSE SERPL-MCNC: 84 MG/DL (ref 70–110)
PHOSPHATE SERPL-MCNC: 3.3 MG/DL (ref 2.7–4.5)
POTASSIUM SERPL-SCNC: 3.9 MMOL/L (ref 3.5–5.1)
PTH-INTACT SERPL-MCNC: 88.4 PG/ML (ref 9–77)
SODIUM SERPL-SCNC: 139 MMOL/L (ref 136–145)

## 2022-08-15 PROCEDURE — 80069 RENAL FUNCTION PANEL: CPT | Performed by: INTERNAL MEDICINE

## 2022-08-15 PROCEDURE — 82306 VITAMIN D 25 HYDROXY: CPT | Performed by: INTERNAL MEDICINE

## 2022-08-15 PROCEDURE — 83970 ASSAY OF PARATHORMONE: CPT | Performed by: INTERNAL MEDICINE

## 2022-08-15 PROCEDURE — 36415 COLL VENOUS BLD VENIPUNCTURE: CPT | Mod: PO | Performed by: INTERNAL MEDICINE

## 2022-08-18 ENCOUNTER — HOSPITAL ENCOUNTER (OUTPATIENT)
Dept: RADIOLOGY | Facility: HOSPITAL | Age: 42
Discharge: HOME | End: 2022-08-18
Attending: OTOLARYNGOLOGY
Payer: COMMERCIAL

## 2022-08-18 DIAGNOSIS — R59.9 PALPABLE LYMPH NODE: ICD-10-CM

## 2022-08-18 PROCEDURE — 76536 US EXAM OF HEAD AND NECK: CPT | Mod: 26,,, | Performed by: RADIOLOGY

## 2022-08-18 PROCEDURE — 76536 US EXAM OF HEAD AND NECK: CPT | Mod: TC

## 2022-08-18 PROCEDURE — 76536 US SOFT TISSUE HEAD NECK THYROID: ICD-10-PCS | Mod: 26,,, | Performed by: RADIOLOGY

## 2022-08-19 ENCOUNTER — PATIENT MESSAGE (OUTPATIENT)
Dept: ENDOCRINOLOGY | Facility: CLINIC | Age: 42
End: 2022-08-19
Payer: COMMERCIAL

## 2022-08-19 DIAGNOSIS — E21.3 HYPERPARATHYROIDISM: Primary | ICD-10-CM

## 2022-09-07 ENCOUNTER — PATIENT MESSAGE (OUTPATIENT)
Dept: INTERNAL MEDICINE | Facility: CLINIC | Age: 42
End: 2022-09-07
Payer: COMMERCIAL

## 2022-09-07 RX ORDER — MONTELUKAST SODIUM 10 MG/1
10 TABLET ORAL NIGHTLY
Qty: 30 TABLET | Refills: 1 | Status: SHIPPED | OUTPATIENT
Start: 2022-09-07 | End: 2023-09-01

## 2022-09-07 NOTE — TELEPHONE ENCOUNTER
Care Due:                  Date            Visit Type   Department     Provider  --------------------------------------------------------------------------------                                VIRTUAL      [facility] INTERNAL  Last Visit: 06-      AUDIO ONLY   MEDICINE       Cherry Matute  Next Visit: None Scheduled  None         None Found                                                            Last  Test          Frequency    Reason                     Performed    Due Date  --------------------------------------------------------------------------------    HBA1C.......  6 months...  semaglutide..............  08- 02-    Health [facility] Embedded Care Gaps. Reference number: [id number]. 9/07/2022   9:54:58 AM CDT

## 2022-09-13 ENCOUNTER — PATIENT MESSAGE (OUTPATIENT)
Dept: INTERNAL MEDICINE | Facility: CLINIC | Age: 42
End: 2022-09-13
Payer: COMMERCIAL

## 2022-09-13 NOTE — TELEPHONE ENCOUNTER
No new care gaps identified.  [facility] Embedded Care Gaps. Reference number: [id number]. 9/13/2022   1:29:34 PM CDT

## 2022-09-14 ENCOUNTER — PATIENT MESSAGE (OUTPATIENT)
Dept: INTERNAL MEDICINE | Facility: CLINIC | Age: 42
End: 2022-09-14
Payer: COMMERCIAL

## 2022-09-14 RX ORDER — SEMAGLUTIDE 2.68 MG/ML
2 INJECTION, SOLUTION SUBCUTANEOUS
Qty: 3 ML | Refills: 12 | Status: SHIPPED | OUTPATIENT
Start: 2022-09-14 | End: 2022-09-30 | Stop reason: SDUPTHER

## 2022-09-30 ENCOUNTER — PATIENT MESSAGE (OUTPATIENT)
Dept: INTERNAL MEDICINE | Facility: CLINIC | Age: 42
End: 2022-09-30
Payer: COMMERCIAL

## 2022-09-30 RX ORDER — SEMAGLUTIDE 2.68 MG/ML
2 INJECTION, SOLUTION SUBCUTANEOUS
Qty: 3 ML | Refills: 12 | Status: SHIPPED | OUTPATIENT
Start: 2022-09-30 | End: 2023-07-31 | Stop reason: SDUPTHER

## 2022-09-30 NOTE — TELEPHONE ENCOUNTER
No new care gaps identified.  [facility] Embedded Care Gaps. Reference number: [id number]. 9/30/2022   9:14:27 AM SARAT

## 2022-10-19 ENCOUNTER — PATIENT MESSAGE (OUTPATIENT)
Dept: OBSTETRICS AND GYNECOLOGY | Facility: CLINIC | Age: 42
End: 2022-10-19
Payer: COMMERCIAL

## 2022-10-19 ENCOUNTER — PATIENT MESSAGE (OUTPATIENT)
Dept: INTERNAL MEDICINE | Facility: CLINIC | Age: 42
End: 2022-10-19
Payer: COMMERCIAL

## 2022-10-19 DIAGNOSIS — Z12.31 ENCOUNTER FOR SCREENING MAMMOGRAM FOR BREAST CANCER: Primary | ICD-10-CM

## 2022-10-19 RX ORDER — CYCLOBENZAPRINE HCL 10 MG
10 TABLET ORAL 3 TIMES DAILY
COMMUNITY
Start: 2022-07-03 | End: 2023-05-11 | Stop reason: SDUPTHER

## 2022-10-19 RX ORDER — IBUPROFEN 800 MG/1
800 TABLET ORAL 3 TIMES DAILY
COMMUNITY
Start: 2022-07-03 | End: 2023-08-30

## 2022-10-19 NOTE — TELEPHONE ENCOUNTER
Please let her know that she is due for her annual exam with me sometime in the next several months, please schedule, we can do routine labs prior once she schedules    Her mammogram is due in November, can use written order guidelines to schedule this

## 2022-10-26 ENCOUNTER — TELEPHONE (OUTPATIENT)
Dept: INTERNAL MEDICINE | Facility: CLINIC | Age: 42
End: 2022-10-26
Payer: COMMERCIAL

## 2022-10-26 NOTE — TELEPHONE ENCOUNTER
----- Message from Kemar Stewart sent at 10/26/2022 10:48 AM CDT -----  Contact: Mark [phone]  Mark from [facility] requesting 1mg of ozempic stated 2mg is out of stock.      [facility] - [place] - [address] [facility]  [address]  [place] [address]  Phone: [phone] Fax: [phone]    Please call and advise

## 2022-11-08 ENCOUNTER — PATIENT MESSAGE (OUTPATIENT)
Dept: OTOLARYNGOLOGY | Facility: CLINIC | Age: 42
End: 2022-11-08
Payer: COMMERCIAL

## 2022-11-10 ENCOUNTER — HOSPITAL ENCOUNTER (OUTPATIENT)
Dept: RADIOLOGY | Facility: HOSPITAL | Age: 42
Discharge: HOME | End: 2022-11-10
Attending: OTOLARYNGOLOGY
Payer: COMMERCIAL

## 2022-11-10 DIAGNOSIS — R59.9 LYMPH NODE ENLARGEMENT: ICD-10-CM

## 2022-11-10 PROCEDURE — 76536 US EXAM OF HEAD AND NECK: CPT | Mod: TC

## 2022-11-10 PROCEDURE — 76536 US SOFT TISSUE HEAD NECK THYROID: ICD-10-PCS | Mod: 26,,, | Performed by: RADIOLOGY

## 2022-11-10 PROCEDURE — 76536 US EXAM OF HEAD AND NECK: CPT | Mod: 26,,, | Performed by: RADIOLOGY

## 2022-12-13 ENCOUNTER — TELEPHONE (OUTPATIENT)
Dept: INTERNAL MEDICINE | Facility: CLINIC | Age: 42
End: 2022-12-13
Payer: COMMERCIAL

## 2022-12-13 NOTE — TELEPHONE ENCOUNTER
Spoke to pt stated that she has been feeling weird over the past few days. She stated that she has a feeling that her stomach is empty and becomes extremely hungry. Stated once she eats the smallest amount of food it subsides. hungry when she eats it goes away.    Fasting glucose was 124 then 132.     Pt is currently taking ozempic.     No other symptom.     She would like to know is this any cause for concern since he blood sugars are stacey then her norm ?

## 2022-12-13 NOTE — TELEPHONE ENCOUNTER
----- Message from Cheyenne Neumann sent at 12/13/2022  1:25 PM CST -----  Contact: self/[phone]  Pt called in regard to talking to the dr about feeling funny this morning and for the last 2 months/124 then 132 her blood sugar. Call back    Please advise

## 2022-12-14 NOTE — TELEPHONE ENCOUNTER
----- Message from Layla Everett sent at 12/14/2022 11:37 AM CST -----  Contact: [phone] Patient  Patient is returning a phone call.  Who left a message for the patient: Gemini Rose LPN  Does patient know what this is regarding:  abdominal pain  Would you like a call back, or a response through your [facility] portal?:   call back  Comments:

## 2022-12-14 NOTE — TELEPHONE ENCOUNTER
If she is having any abdominal pain on the Ozempic, she would likely need to be seen in urgent care or ER in terms of possible pancreatitis    Otherwise, I would recommend continuing to monitor and holding the Ozempic for a couple of weeks

## 2023-02-12 ENCOUNTER — PATIENT MESSAGE (OUTPATIENT)
Dept: INTERNAL MEDICINE | Facility: CLINIC | Age: 43
End: 2023-02-12
Payer: COMMERCIAL

## 2023-02-13 ENCOUNTER — PATIENT MESSAGE (OUTPATIENT)
Dept: INTERNAL MEDICINE | Facility: CLINIC | Age: 43
End: 2023-02-13
Payer: COMMERCIAL

## 2023-02-13 RX ORDER — FLUCONAZOLE 150 MG/1
TABLET ORAL
Qty: 2 TABLET | Refills: 1 | Status: SHIPPED | OUTPATIENT
Start: 2023-02-13 | End: 2023-08-30

## 2023-02-13 NOTE — TELEPHONE ENCOUNTER
Yes, Diflucan sent in.  Drink lots of water, loose-fitting clothing, cotton underwear, avoid sitting in the tub, can take showers.  Follow-up poor results, thank you

## 2023-04-06 ENCOUNTER — PATIENT MESSAGE (OUTPATIENT)
Dept: ADMINISTRATIVE | Facility: HOSPITAL | Age: 43
End: 2023-04-06
Payer: COMMERCIAL

## 2023-04-12 ENCOUNTER — PATIENT MESSAGE (OUTPATIENT)
Dept: INTERNAL MEDICINE | Facility: CLINIC | Age: 43
End: 2023-04-12
Payer: COMMERCIAL

## 2023-04-12 NOTE — TELEPHONE ENCOUNTER
Okay to send her [employer] message letting her know:    Dr. Edwards is no longer working.  She retired due to personal medical issues.  She will need to get established with a new gynecologist.

## 2023-04-21 ENCOUNTER — PATIENT MESSAGE (OUTPATIENT)
Dept: ADMINISTRATIVE | Facility: HOSPITAL | Age: 43
End: 2023-04-21
Payer: COMMERCIAL

## 2023-05-11 ENCOUNTER — OFFICE VISIT (OUTPATIENT)
Dept: INTERNAL MEDICINE | Facility: CLINIC | Age: 43
End: 2023-05-11
Payer: COMMERCIAL

## 2023-05-11 VITALS — HEIGHT: 68 IN | WEIGHT: 227 LBS | BODY MASS INDEX: 34.4 KG/M2

## 2023-05-11 DIAGNOSIS — M54.9 BILATERAL BACK PAIN, UNSPECIFIED BACK LOCATION, UNSPECIFIED CHRONICITY: ICD-10-CM

## 2023-05-11 DIAGNOSIS — Z12.11 COLON CANCER SCREENING: ICD-10-CM

## 2023-05-11 DIAGNOSIS — G47.33 OSA (OBSTRUCTIVE SLEEP APNEA): ICD-10-CM

## 2023-05-11 DIAGNOSIS — Z00.00 ANNUAL PHYSICAL EXAM: Primary | ICD-10-CM

## 2023-05-11 DIAGNOSIS — E04.2 MULTIPLE THYROID NODULES: ICD-10-CM

## 2023-05-11 DIAGNOSIS — E21.3 HYPERPARATHYROIDISM: ICD-10-CM

## 2023-05-11 DIAGNOSIS — Z86.718 HISTORY OF DVT (DEEP VEIN THROMBOSIS): ICD-10-CM

## 2023-05-11 DIAGNOSIS — I82.532 CHRONIC DEEP VEIN THROMBOSIS (DVT) OF POPLITEAL VEIN OF LEFT LOWER EXTREMITY: ICD-10-CM

## 2023-05-11 PROBLEM — E66.01 SEVERE OBESITY (BMI 35.0-35.9 WITH COMORBIDITY): Status: RESOLVED | Noted: 2021-04-21 | Resolved: 2023-05-11

## 2023-05-11 PROCEDURE — 99999 PR PBB SHADOW E&M-EST. PATIENT-LVL III: CPT | Mod: PBBFAC,,, | Performed by: INTERNAL MEDICINE

## 2023-05-11 PROCEDURE — 99999 PR PBB SHADOW E&M-EST. PATIENT-LVL III: ICD-10-PCS | Mod: PBBFAC,,, | Performed by: INTERNAL MEDICINE

## 2023-05-11 PROCEDURE — 99396 PR PREVENTIVE VISIT,EST,40-64: ICD-10-PCS | Mod: S$GLB,,, | Performed by: INTERNAL MEDICINE

## 2023-05-11 PROCEDURE — 99396 PREV VISIT EST AGE 40-64: CPT | Mod: S$GLB,,, | Performed by: INTERNAL MEDICINE

## 2023-05-11 RX ORDER — CYCLOBENZAPRINE HCL 10 MG
10 TABLET ORAL 3 TIMES DAILY
Qty: 30 TABLET | Refills: 3 | Status: SHIPPED | OUTPATIENT
Start: 2023-05-11 | End: 2023-09-01

## 2023-05-11 NOTE — PROGRESS NOTES
Patient ID: Antonia Jeffers is a 43 y.o. female.    Chief Complaint: Annual Exam      Assessment:       1. Annual physical exam    2. VIOLETTA (obstructive sleep apnea): see HST 5/21, mild positional    3. Multiple thyroid nodules: stable 8/22; per radiology no need for FNA or follow-up    4. Hyperparathyroidism: 3/22; however labs repeated 8/22 wnl.  Normal DEXA 4/22    5. History of DVT (deep vein thrombosis)    6. Chronic deep vein thrombosis (DVT) of popliteal vein of left lower extremity: see notes from Dr Hernandez hematology 2018- okay to stop anticoagulation    7. Colon cancer screening    8. Bilateral back pain, unspecified back location, unspecified chronicity          Plan:         1. Annual physical exam  -     CBC Auto Differential; Future; Expected date: 05/11/2023  -     Comprehensive Metabolic Panel; Future; Expected date: 05/11/2023  -     Hemoglobin A1C; Future; Expected date: 05/11/2023  -     Lipid Panel; Future; Expected date: 05/11/2023  -     TSH; Future; Expected date: 05/11/2023    2. VIOLETTA (obstructive sleep apnea): see HST 5/21, mild positional:  Doing well on no treatment currently, is losing weight.  Alarm symptoms and indications for treatment reviewed.  She does not want to pursue treatment currently.  Overview:  HST 5-4-2021 AHI 9         3. Multiple thyroid nodules: stable 8/22; per radiology no need for FNA or follow-up    4. Hyperparathyroidism: 3/22; however labs repeated 8/22 wnl.  Normal DEXA 4/22    5. History of DVT (deep vein thrombosis)    6. Chronic deep vein thrombosis (DVT) of popliteal vein of left lower extremity: see notes from Dr Hernandez hematology 2018- okay to stop anticoagulation  Overview:  06/06/2018 diagnosed with DVT, presumably provoked as she was on OCP at the time per Dr. Hernandez's notes.  She completed at least 3 months of anticoagulation therapy.  FVL test negative.      7. Colon cancer screening  -     Fecal Immunochemical Test (iFOBT); Future; Expected date:  05/11/2023    8. Bilateral back pain, unspecified back location, unspecified chronicity:  No alarm symptoms.  Ice alternating with heat, low-dose anti-inflammatories and follow up poor results    Other orders  -     cyclobenzaprine (FLEXERIL) 10 MG tablet; Take 1 tablet (10 mg total) by mouth 3 (three) times daily.  Dispense: 30 tablet; Refill: 3     I will review all studies and determine further tx depending on findings   Complete fit kit   Schedule mammogram   She declines COVID vaccine   Continue with exercise, portion control, sleep hygiene and slow and steady weight loss    Subjective:   Annual exam    Deliberate weight loss, BMI now 34.  Has been able to get Ozempic.    VIOLETTA, reviewed; this was diagnosed per HST May 2021, mild and positional.  She has not been able to tolerate CPAP.   Wakes up feeling well, not tired.    Walks for exercise.    Thyroid issues reviewed, had an ultrasound in August 2022 which was acceptable.  Apparently, no indication for FNA or further follow-up of thyroid nodules.    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  PTH was initially elevated in March of 2022 but was normal in August of 2022.  DEXA scan was acceptable in April of 2022.    Father with MI/sudden death- revived 3/23.    Back pain since working on pipe outside of house.    Patient Active Problem List:     Fibromyalgia     Adjustment disorder with mixed anxiety and depressed mood     Seasonal allergic rhinitis due to pollen     Varicose veins of left lower extremity     Intrauterine device     Chronic deep vein thrombosis (DVT) of popliteal vein of left lower extremity: see notes from Dr Hernandez hematology 2018- tyrell to stop anticoagulation     COVID-19 virus infection: dx 4/7/20     VIOLETTA (obstructive sleep apnea): see HST 5/21, mild positional     History of DVT (deep vein thrombosis): see notes from hematology Dr Hernandez 2018- tyrell to stop anticoagulation     Multiple thyroid nodules: stable 8/22; per radiology no  need for FNA or follow-up     Tinnitus of left ear     Hyperparathyroidism: 3/22; however labs repeated 8/22 wnl.  Normal DEXA 4/22             Review of Systems   Constitutional:  Negative for fatigue.   HENT:  Negative for hearing loss.    Eyes:  Negative for visual disturbance.   Respiratory:  Negative for shortness of breath.    Cardiovascular:  Negative for chest pain.   Gastrointestinal:  Negative for abdominal pain, constipation and diarrhea.   Genitourinary:  Negative for dysuria, frequency and vaginal bleeding.   Musculoskeletal:  Positive for back pain. Negative for joint swelling.        Not severe pain, no radiation or fever.  No flank pain or dysuria.  She thinks is musculoskeletal from working on a pipe outside her house   Skin:  Negative for rash.   Neurological:  Negative for weakness, light-headedness and headaches.   Psychiatric/Behavioral:  Negative for sleep disturbance.        Objective:      Physical Exam  Vitals and nursing note reviewed.   Constitutional:       Appearance: She is well-developed.   HENT:      Head: Normocephalic and atraumatic.      Right Ear: External ear normal.      Left Ear: External ear normal.      Nose: Nose normal.      Mouth/Throat:      Pharynx: No oropharyngeal exudate.   Eyes:      General: No scleral icterus.     Extraocular Movements: Extraocular movements intact.      Conjunctiva/sclera: Conjunctivae normal.   Neck:      Thyroid: Thyromegaly present.      Vascular: No JVD.   Cardiovascular:      Rate and Rhythm: Normal rate and regular rhythm.      Heart sounds: Normal heart sounds. No murmur heard.    No gallop.   Pulmonary:      Effort: Pulmonary effort is normal. No respiratory distress.      Breath sounds: Normal breath sounds. No wheezing.   Abdominal:      General: Bowel sounds are normal. There is no distension.      Palpations: Abdomen is soft. There is no mass.      Tenderness: There is no abdominal tenderness. There is no guarding or rebound.    Musculoskeletal:         General: No tenderness. Normal range of motion.      Cervical back: Normal range of motion and neck supple.      Comments: No CVA pain.   Strength UE and LE wnl.  No pain over spine   Lymphadenopathy:      Cervical: No cervical adenopathy.   Skin:     General: Skin is warm and dry.      Findings: No erythema or rash.   Neurological:      General: No focal deficit present.      Mental Status: She is alert and oriented to person, place, and time.      Cranial Nerves: No cranial nerve deficit.      Coordination: Coordination normal.      Deep Tendon Reflexes: Reflexes normal.   Psychiatric:         Behavior: Behavior normal.         Thought Content: Thought content normal.         Judgment: Judgment normal.           Health Maintenance Due   Topic Date Due    COVID-19 Vaccine (1) Never done    Mammogram  11/18/2022    Colorectal Cancer Screening  Never done

## 2023-05-12 ENCOUNTER — LAB VISIT (OUTPATIENT)
Dept: LAB | Facility: HOSPITAL | Age: 43
End: 2023-05-12
Attending: INTERNAL MEDICINE
Payer: COMMERCIAL

## 2023-05-12 DIAGNOSIS — Z00.00 ANNUAL PHYSICAL EXAM: ICD-10-CM

## 2023-05-12 LAB
ALBUMIN SERPL BCP-MCNC: 4 G/DL (ref 3.5–5.2)
ALP SERPL-CCNC: 46 U/L (ref 55–135)
ALT SERPL W/O P-5'-P-CCNC: 18 U/L (ref 10–44)
ANION GAP SERPL CALC-SCNC: 7 MMOL/L (ref 8–16)
AST SERPL-CCNC: 17 U/L (ref 10–40)
BASOPHILS # BLD AUTO: 0.03 K/UL (ref 0–0.2)
BASOPHILS NFR BLD: 0.6 % (ref 0–1.9)
BILIRUB SERPL-MCNC: 0.4 MG/DL (ref 0.1–1)
BUN SERPL-MCNC: 12 MG/DL (ref 6–20)
CALCIUM SERPL-MCNC: 10 MG/DL (ref 8.7–10.5)
CHLORIDE SERPL-SCNC: 106 MMOL/L (ref 95–110)
CHOLEST SERPL-MCNC: 169 MG/DL (ref 120–199)
CHOLEST/HDLC SERPL: 2.7 {RATIO} (ref 2–5)
CO2 SERPL-SCNC: 25 MMOL/L (ref 23–29)
CREAT SERPL-MCNC: 0.9 MG/DL (ref 0.5–1.4)
DIFFERENTIAL METHOD: ABNORMAL
EOSINOPHIL # BLD AUTO: 0.1 K/UL (ref 0–0.5)
EOSINOPHIL NFR BLD: 1.4 % (ref 0–8)
ERYTHROCYTE [DISTWIDTH] IN BLOOD BY AUTOMATED COUNT: 13 % (ref 11.5–14.5)
EST. GFR  (NO RACE VARIABLE): >60 ML/MIN/1.73 M^2
ESTIMATED AVG GLUCOSE: 94 MG/DL (ref 68–131)
GLUCOSE SERPL-MCNC: 76 MG/DL (ref 70–110)
HBA1C MFR BLD: 4.9 % (ref 4–5.6)
HCT VFR BLD AUTO: 42.9 % (ref 37–48.5)
HDLC SERPL-MCNC: 62 MG/DL (ref 40–75)
HDLC SERPL: 36.7 % (ref 20–50)
HGB BLD-MCNC: 13.4 G/DL (ref 12–16)
IMM GRANULOCYTES # BLD AUTO: 0.01 K/UL (ref 0–0.04)
IMM GRANULOCYTES NFR BLD AUTO: 0.2 % (ref 0–0.5)
LDLC SERPL CALC-MCNC: 94.4 MG/DL (ref 63–159)
LYMPHOCYTES # BLD AUTO: 2.4 K/UL (ref 1–4.8)
LYMPHOCYTES NFR BLD: 45.7 % (ref 18–48)
MCH RBC QN AUTO: 29.1 PG (ref 27–31)
MCHC RBC AUTO-ENTMCNC: 31.2 G/DL (ref 32–36)
MCV RBC AUTO: 93 FL (ref 82–98)
MONOCYTES # BLD AUTO: 0.4 K/UL (ref 0.3–1)
MONOCYTES NFR BLD: 7 % (ref 4–15)
NEUTROPHILS # BLD AUTO: 2.3 K/UL (ref 1.8–7.7)
NEUTROPHILS NFR BLD: 45.1 % (ref 38–73)
NONHDLC SERPL-MCNC: 107 MG/DL
NRBC BLD-RTO: 0 /100 WBC
PLATELET # BLD AUTO: 272 K/UL (ref 150–450)
PMV BLD AUTO: 10.6 FL (ref 9.2–12.9)
POTASSIUM SERPL-SCNC: 4 MMOL/L (ref 3.5–5.1)
PROT SERPL-MCNC: 6.8 G/DL (ref 6–8.4)
RBC # BLD AUTO: 4.61 M/UL (ref 4–5.4)
SODIUM SERPL-SCNC: 138 MMOL/L (ref 136–145)
TRIGL SERPL-MCNC: 63 MG/DL (ref 30–150)
TSH SERPL DL<=0.005 MIU/L-ACNC: 1.13 UIU/ML (ref 0.4–4)
WBC # BLD AUTO: 5.14 K/UL (ref 3.9–12.7)

## 2023-05-12 PROCEDURE — 84443 ASSAY THYROID STIM HORMONE: CPT | Performed by: INTERNAL MEDICINE

## 2023-05-12 PROCEDURE — 80061 LIPID PANEL: CPT | Performed by: INTERNAL MEDICINE

## 2023-05-12 PROCEDURE — 36415 COLL VENOUS BLD VENIPUNCTURE: CPT | Mod: PO | Performed by: INTERNAL MEDICINE

## 2023-05-12 PROCEDURE — 80053 COMPREHEN METABOLIC PANEL: CPT | Performed by: INTERNAL MEDICINE

## 2023-05-12 PROCEDURE — 85025 COMPLETE CBC W/AUTO DIFF WBC: CPT | Performed by: INTERNAL MEDICINE

## 2023-05-12 PROCEDURE — 83036 HEMOGLOBIN GLYCOSYLATED A1C: CPT | Performed by: INTERNAL MEDICINE

## 2023-06-14 ENCOUNTER — PATIENT MESSAGE (OUTPATIENT)
Dept: INTERNAL MEDICINE | Facility: CLINIC | Age: 43
End: 2023-06-14
Payer: COMMERCIAL

## 2023-06-14 ENCOUNTER — E-VISIT (OUTPATIENT)
Dept: INTERNAL MEDICINE | Facility: CLINIC | Age: 43
End: 2023-06-14
Payer: COMMERCIAL

## 2023-06-14 DIAGNOSIS — N30.00 ACUTE CYSTITIS WITHOUT HEMATURIA: Primary | ICD-10-CM

## 2023-06-14 PROCEDURE — 99421 OL DIG E/M SVC 5-10 MIN: CPT | Mod: ,,, | Performed by: INTERNAL MEDICINE

## 2023-06-14 PROCEDURE — 99421 PR E&M, ONLINE DIGIT, EST, < 7 DAYS, 5-10 MINS: ICD-10-PCS | Mod: ,,, | Performed by: INTERNAL MEDICINE

## 2023-06-14 RX ORDER — NITROFURANTOIN 25; 75 MG/1; MG/1
100 CAPSULE ORAL 2 TIMES DAILY
Qty: 10 CAPSULE | Refills: 0 | Status: SHIPPED | OUTPATIENT
Start: 2023-06-14 | End: 2023-08-22

## 2023-06-14 NOTE — PROGRESS NOTES
Patient ID: Antonia Jeffers is a 43 y.o. female.    Chief Complaint: Urinary Tract Infection    The patient initiated a request through [employer] on 6/14/2023 for evaluation and management with a chief complaint of Urinary Tract Infection     I evaluated the questionnaire submission on 6/14/23.    [facility] Peq Evisit Uti Questionnaire    6/14/2023  7:51 AM CDT - Filed by Patient   Do you agree to participate in an E-Visit? Yes   If you have any of the following problems, please present to your local ER or call 911:  I acknowledge   What is the main issue that you would like for your doctor to address today? Urinary discomfort and continual urgency   Are you able to take your vital signs? Yes   Systolic Blood Pressure: 110   Diastolic Blood Pressure: 71   Weight: 219   Height: 68   Pulse: 65   Temperature: 97.6   Respiration rate: 18   Pulse Oxygen: 99   Are you currently pregnant, could you be pregnant, or are you breast feeding? None of the above   What symptoms do you currently have? Pain while passing urine   When did your symptoms first appear? 6/13/2023   List what you have done or taken to help your symptoms. AZO, cranberry juice, increased water intake   Please indicate whether you have had the following symptoms during the past 24 hours     Urgent urination (a sudden and uncontrollable urge to urinate) Moderate   Frequent urination of small amounts of urine (going to the toilet very often) Severe   Burning pain when urinating Mild   Incomplete bladder emptying (still feel like you need to urinate again after urination) Moderate   Pain not associated with urination in the lower abdomen below the belly button) Mild   What does your urine look like? Cloudy   Blood seen in the urine None   Flank pain (pain in one or both sides of the lower back) None   Abnormal Vaginal Discharge (abnormal amount, color and/or odor) None   Discharge from the urethra (urinary opening) without urination None   High body  temperature/fever? None-<99.5   Please rate how much discomfort you have experience because of the symptoms in the past 24 hours: Moderate   Please indicate how the symptoms have interfered with your every day activities/work in the past 24 hours: Mild   Please indicate how these symptoms have interfered with your social activities (visiting people, meeting with friends, etc.) in the past 24 hours? Moderate   Are you a diabetic? No   Please indicate whether you have the following at the time of completion of this questionnaire: None of the above   Provide any information you feel is important to your history not asked above    Please attach any relevant images or files (if you have performed a home test for UTI, please submit a photo of results)          Recent Labs Obtained:  No visits with results within 7 Day(s) from this visit.   Latest known visit with results is:   Lab Visit on 05/12/2023   Component Date Value Ref Range Status    WBC 05/12/2023 5.14  3.90 - 12.70 K/uL Final    RBC 05/12/2023 4.61  4.00 - 5.40 M/uL Final    Hemoglobin 05/12/2023 13.4  12.0 - 16.0 g/dL Final    Hematocrit 05/12/2023 42.9  37.0 - 48.5 % Final    MCV 05/12/2023 93  82 - 98 fL Final    MCH 05/12/2023 29.1  27.0 - 31.0 pg Final    MCHC 05/12/2023 31.2 (L)  32.0 - 36.0 g/dL Final    RDW 05/12/2023 13.0  11.5 - 14.5 % Final    Platelets 05/12/2023 272  150 - 450 K/uL Final    MPV 05/12/2023 10.6  9.2 - 12.9 fL Final    Immature Granulocytes 05/12/2023 0.2  0.0 - 0.5 % Final    Gran # (ANC) 05/12/2023 2.3  1.8 - 7.7 K/uL Final    Immature Grans (Abs) 05/12/2023 0.01  0.00 - 0.04 K/uL Final    Comment: Mild elevation in immature granulocytes is non specific and   can be seen in a variety of conditions including stress response,   acute inflammation, trauma and pregnancy. Correlation with other   laboratory and clinical findings is essential.      Lymph # 05/12/2023 2.4  1.0 - 4.8 K/uL Final    Mono # 05/12/2023 0.4  0.3 - 1.0 K/uL  Final    Eos # 05/12/2023 0.1  0.0 - 0.5 K/uL Final    Baso # 05/12/2023 0.03  0.00 - 0.20 K/uL Final    nRBC 05/12/2023 0  0 /100 WBC Final    Gran % 05/12/2023 45.1  38.0 - 73.0 % Final    Lymph % 05/12/2023 45.7  18.0 - 48.0 % Final    Mono % 05/12/2023 7.0  4.0 - 15.0 % Final    Eosinophil % 05/12/2023 1.4  0.0 - 8.0 % Final    Basophil % 05/12/2023 0.6  0.0 - 1.9 % Final    Differential Method 05/12/2023 Automated   Final    Sodium 05/12/2023 138  136 - 145 mmol/L Final    Potassium 05/12/2023 4.0  3.5 - 5.1 mmol/L Final    Chloride 05/12/2023 106  95 - 110 mmol/L Final    CO2 05/12/2023 25  23 - 29 mmol/L Final    Glucose 05/12/2023 76  70 - 110 mg/dL Final    BUN 05/12/2023 12  6 - 20 mg/dL Final    Creatinine 05/12/2023 0.9  0.5 - 1.4 mg/dL Final    Calcium 05/12/2023 10.0  8.7 - 10.5 mg/dL Final    Total Protein 05/12/2023 6.8  6.0 - 8.4 g/dL Final    Albumin 05/12/2023 4.0  3.5 - 5.2 g/dL Final    Total Bilirubin 05/12/2023 0.4  0.1 - 1.0 mg/dL Final    Comment: For infants and newborns, interpretation of results should be based  on gestational age, weight and in agreement with clinical  observations.    Premature Infant recommended reference ranges:  Up to 24 hours.............<8.0 mg/dL  Up to 48 hours............<12.0 mg/dL  3-5 days..................<15.0 mg/dL  6-29 days.................<15.0 mg/dL      Alkaline Phosphatase 05/12/2023 46 (L)  55 - 135 U/L Final    AST 05/12/2023 17  10 - 40 U/L Final    ALT 05/12/2023 18  10 - 44 U/L Final    Anion Gap 05/12/2023 7 (L)  8 - 16 mmol/L Final    eGFR 05/12/2023 >60.0  >60 mL/min/1.73 m^2 Final    Hemoglobin A1C 05/12/2023 4.9  4.0 - 5.6 % Final    Comment: ADA Screening Guidelines:  5.7-6.4%  Consistent with prediabetes  >or=6.5%  Consistent with diabetes    High levels of fetal hemoglobin interfere with the HbA1C  assay. Heterozygous hemoglobin variants (HbS, HgC, etc)do  not significantly interfere with this assay.   However, presence of multiple  variants may affect accuracy.      Estimated Avg Glucose 05/12/2023 94  68 - 131 mg/dL Final    Cholesterol 05/12/2023 169  120 - 199 mg/dL Final    Comment: The National Cholesterol Education Program (NCEP) has set the  following guidelines (reference ranges) for Cholesterol:  Optimal.....................<200 mg/dL  Borderline High.............200-239 mg/dL  High........................> or = 240 mg/dL      Triglycerides 05/12/2023 63  30 - 150 mg/dL Final    Comment: The National Cholesterol Education Program (NCEP) has set the  following guidelines (reference values) for triglycerides:  Normal......................<150 mg/dL  Borderline High.............150-199 mg/dL  High........................200-499 mg/dL      HDL 05/12/2023 62  40 - 75 mg/dL Final    Comment: The National Cholesterol Education Program (NCEP) has set the  following guidelines (reference values) for HDL Cholesterol:  Low...............<40 mg/dL  Optimal...........>60 mg/dL      LDL Cholesterol 05/12/2023 94.4  63.0 - 159.0 mg/dL Final    Comment: The National Cholesterol Education Program (NCEP) has set the  following guidelines (reference values) for LDL Cholesterol:  Optimal.......................<130 mg/dL  Borderline High...............130-159 mg/dL  High..........................160-189 mg/dL  Very High.....................>190 mg/dL      HDL/Cholesterol Ratio 05/12/2023 36.7  20.0 - 50.0 % Final    Total Cholesterol/HDL Ratio 05/12/2023 2.7  2.0 - 5.0 Final    Non-HDL Cholesterol 05/12/2023 107  mg/dL Final    Comment: Risk category and Non-HDL cholesterol goals:  Coronary heart disease (CHD)or equivalent (10-year risk of CHD >20%):  Non-HDL cholesterol goal     <130 mg/dL  Two or more CHD risk factors and 10-year risk of CHD <= 20%:  Non-HDL cholesterol goal     <160 mg/dL  0 to 1 CHD risk factor:  Non-HDL cholesterol goal     <190 mg/dL      TSH 05/12/2023 1.130  0.400 - 4.000 uIU/mL Final       Encounter Diagnosis   Name Primary?     Acute cystitis without hematuria Yes        E-Visit Time Tracking:    Day 1 Time (in minutes): 7     Total Time (in minutes): 7          1. Acute cystitis without hematuria    Other orders  -     nitrofurantoin, macrocrystal-monohydrate, (MACROBID) 100 MG capsule; Take 1 capsule (100 mg total) by mouth 2 (two) times daily.  Dispense: 10 capsule; Refill: 0     I am sorry to hear that you are not feeling well.  Thank you for completing the E visit.     I sent in antibiotics to your pharmacy.  Please increase fluids.  If you have any abdominal pain, fever, vomiting or flank pain, please let us know right away.  If you have any issues after hours, please contact [facility] on call or symptoms worsen, please go to urgent care or the emergency room.  Certainly, if you are not better after your treatment, we will need to see you in person in clinic.  Please keep us apprised.  All best-     LB      Patient Education        Acute Cystitis Discharge Instructions   About this topic   Acute cystitis is irritation of the bladder. It is often caused by germs getting into the urinary tract. The urinary tract includes the kidneys, ureters, bladder, and urethra. The urethra is a tube at the bottom of the bladder. Urine flows out of this tube. The germs enter the urethra and then spread in the bladder. These germs may cause an infection in the bladder or urinary tract. This condition may also be caused by irritation from things like bubble baths, sanitary pads, sex, certain drugs, or certain foods. This condition is more common if you are pregnant.         What care is needed at home?   Ask your doctor what you need to do when you go home. Make sure you ask questions if you do not understand what the doctor says.  For the first day or so, you may want to take an over-the-counter medicine, like phenazopyridine. This will help to numb your bladder. You will also not have the strong urge to urinate. This medicine causes your urine and  tears to look orange. If you have kidney disease, talk to your doctor before taking this medicine.  To lower your chance of getting a UTI in the future, you can:  Drink extra fluids.  If you have sex, urinate right afterwards.  Apply a warm compress or warm water bottle to your lower belly to lessen pain.  Practice good hygiene. Wipe from front to back after going to the toilet.  Do not use scented tampons, soap, or toilet paper.  Keep your genital area clean. Wash daily with soap and water.  Take showers instead of tub baths.  Do not use douches or genital hygiene sprays.  What follow-up care is needed?   Your doctor may ask you to make visits to the office to check on your progress. Be sure to keep these visits.  What drugs may be needed?   The doctor may order drugs to:  Help with pain or swelling  Help with the urge or need to pass urine often  Fight an infection  Will physical activity be limited?   Physical activities will not be limited. You may have to pass urine more often.  What changes to diet are needed?   Do not drink beer, wine, and mixed drinks (alcohol) or caffeine. These can bother the bladder.  Talk to your doctor about drinking cranberry juice or taking cranberry tablets.  What problems could happen?   Kidney infection  Blood stream infection or sepsis  Blood in the urine  When do I need to call the doctor?   You have very bad pain in your back, shoulder, or belly.  You have a fever of 100.4°F (38°C) or higher; shaking chills or sweats even though you are taking antibiotics.  You notice more blood in your urine.  Your signs get worse or do not improve within 24 hours of starting treatment.  You are not able to urinate for more than 8 hours.  Your signs come back after treatment has stopped.  Teach Back: Helping You Understand   The Teach Back Method helps you understand the information we are giving you. After you talk with the staff, tell them in your own words what you learned. This helps to  make sure the staff has described each thing clearly. It also helps to explain things that may have been confusing. Before going home, make sure you can do these:  I can tell you about my condition.  I can tell you what are good fluids for me to drink and how often I should try to go to the bathroom.  I can tell you what I will do if I have a fever; chills; pain with passing urine; blood in my urine; or back, side, or belly pain.  Where can I learn more?   [employer]  https://www.betterhealth.[place]/health/ConditionsAndTreatments/cystitis   NHS Choices  http://www.nhs.uk/conditions/Cystitis/Pages/Introduction.aspx   Last Reviewed Date   2021-06-09  Consumer Information Use and Disclaimer   This information is not specific medical advice and does not replace information you receive from your health care provider. This is only a brief summary of general information. It does NOT include all information about conditions, illnesses, injuries, tests, procedures, treatments, therapies, discharge instructions or life-style choices that may apply to you. You must talk with your health care provider for complete information about your health and treatment options. This information should not be used to decide whether or not to accept your health care providers advice, instructions or recommendations. Only your health care provider has the knowledge and training to provide advice that is right for you.  Copyright   Copyright © 2021 [employer] and its affiliates and/or licensors. All rights reserved.

## 2023-07-07 ENCOUNTER — PATIENT MESSAGE (OUTPATIENT)
Dept: INTERNAL MEDICINE | Facility: CLINIC | Age: 43
End: 2023-07-07
Payer: COMMERCIAL

## 2023-07-07 DIAGNOSIS — N30.00 ACUTE CYSTITIS WITHOUT HEMATURIA: Primary | ICD-10-CM

## 2023-07-07 RX ORDER — AMOXICILLIN AND CLAVULANATE POTASSIUM 875; 125 MG/1; MG/1
1 TABLET, FILM COATED ORAL 2 TIMES DAILY
Qty: 10 TABLET | Refills: 0 | Status: SHIPPED | OUTPATIENT
Start: 2023-07-07 | End: 2023-08-22

## 2023-07-07 NOTE — TELEPHONE ENCOUNTER
I would like for her to submit a urine culture and then I can send in antibiotics to her pharmacy    Urine culture ordered, she can drop it off at any site and then she can  the antibiotics thank you

## 2023-07-10 ENCOUNTER — LAB VISIT (OUTPATIENT)
Dept: LAB | Facility: HOSPITAL | Age: 43
End: 2023-07-10
Attending: INTERNAL MEDICINE
Payer: COMMERCIAL

## 2023-07-10 DIAGNOSIS — N30.00 ACUTE CYSTITIS WITHOUT HEMATURIA: ICD-10-CM

## 2023-07-10 LAB
BILIRUB UR QL STRIP: NEGATIVE
CLARITY UR REFRACT.AUTO: CLEAR
COLOR UR AUTO: YELLOW
GLUCOSE UR QL STRIP: NEGATIVE
HGB UR QL STRIP: NEGATIVE
KETONES UR QL STRIP: NEGATIVE
LEUKOCYTE ESTERASE UR QL STRIP: NEGATIVE
NITRITE UR QL STRIP: NEGATIVE
PH UR STRIP: 6 [PH] (ref 5–8)
PROT UR QL STRIP: NEGATIVE
SP GR UR STRIP: 1.01 (ref 1–1.03)
URN SPEC COLLECT METH UR: NORMAL

## 2023-07-10 PROCEDURE — 81003 URINALYSIS AUTO W/O SCOPE: CPT | Performed by: INTERNAL MEDICINE

## 2023-07-10 NOTE — TELEPHONE ENCOUNTER
Called and spoke to patient.     Pt gave urine sample 30 min ago. However, pt started antibiotics Saturday.    Presently positive very mild urgency. Improving symptoms after 2 days of antibiotic use.  Resolved symptoms- extreme urgency, inability to empty bladder, bloated    *Had another UTI 6/14- no symptoms after completing antibiotics

## 2023-07-10 NOTE — TELEPHONE ENCOUNTER
Please contact her, it does not look like she submitted the urine that we requested last weekend, how is she doing?

## 2023-07-26 ENCOUNTER — PATIENT MESSAGE (OUTPATIENT)
Dept: INTERNAL MEDICINE | Facility: CLINIC | Age: 43
End: 2023-07-26
Payer: COMMERCIAL

## 2023-07-31 RX ORDER — SEMAGLUTIDE 2.68 MG/ML
2 INJECTION, SOLUTION SUBCUTANEOUS
Qty: 3 ML | Refills: 12 | Status: SHIPPED | OUTPATIENT
Start: 2023-07-31 | End: 2023-08-30

## 2023-07-31 NOTE — TELEPHONE ENCOUNTER
No care due was identified.  [facility] Embedded Care Due Messages. Reference number: [id number].   7/31/2023 2:21:07 PM CDT

## 2023-08-01 ENCOUNTER — PATIENT MESSAGE (OUTPATIENT)
Dept: INTERNAL MEDICINE | Facility: CLINIC | Age: 43
End: 2023-08-01
Payer: COMMERCIAL

## 2023-08-02 ENCOUNTER — TELEPHONE (OUTPATIENT)
Dept: INTERNAL MEDICINE | Facility: CLINIC | Age: 43
End: 2023-08-02
Payer: COMMERCIAL

## 2023-08-02 RX ORDER — SEMAGLUTIDE 2.68 MG/ML
2 INJECTION, SOLUTION SUBCUTANEOUS
Qty: 3 ML | Refills: 12 | Status: CANCELLED | OUTPATIENT
Start: 2023-08-02

## 2023-08-02 NOTE — TELEPHONE ENCOUNTER
No care due was identified.  [facility] Embedded Care Due Messages. Reference number: [id number].   8/02/2023 8:22:58 AM CDT

## 2023-08-02 NOTE — TELEPHONE ENCOUNTER
Please let her know her insurance won't pay for this (Ozempic)    She is not diabetic    I can print this for her and she can research cost

## 2023-08-07 ENCOUNTER — TELEPHONE (OUTPATIENT)
Dept: INTERNAL MEDICINE | Facility: CLINIC | Age: 43
End: 2023-08-07
Payer: COMMERCIAL

## 2023-08-07 NOTE — TELEPHONE ENCOUNTER
Ozempic 2 mg Prior Auth sent to "[employer] is processing your PA request and will respond shortly with next steps. You are currently using the fastest method to process this prior authorization. Please do not fax or call "[employer] to resubmit this request. To check for an update later, open this request again from your dashboard.

## 2023-08-22 ENCOUNTER — PATIENT MESSAGE (OUTPATIENT)
Dept: INTERNAL MEDICINE | Facility: CLINIC | Age: 43
End: 2023-08-22
Payer: COMMERCIAL

## 2023-08-22 ENCOUNTER — TELEPHONE (OUTPATIENT)
Dept: FAMILY MEDICINE | Facility: CLINIC | Age: 43
End: 2023-08-22
Payer: COMMERCIAL

## 2023-08-22 ENCOUNTER — OFFICE VISIT (OUTPATIENT)
Dept: FAMILY MEDICINE | Facility: CLINIC | Age: 43
End: 2023-08-22
Payer: COMMERCIAL

## 2023-08-22 ENCOUNTER — LAB VISIT (OUTPATIENT)
Dept: LAB | Facility: HOSPITAL | Age: 43
End: 2023-08-22
Attending: INTERNAL MEDICINE
Payer: COMMERCIAL

## 2023-08-22 ENCOUNTER — PATIENT MESSAGE (OUTPATIENT)
Dept: FAMILY MEDICINE | Facility: CLINIC | Age: 43
End: 2023-08-22

## 2023-08-22 DIAGNOSIS — R39.89 SUSPECTED UTI: ICD-10-CM

## 2023-08-22 DIAGNOSIS — R39.89 SUSPECTED UTI: Primary | ICD-10-CM

## 2023-08-22 DIAGNOSIS — F43.23 ADJUSTMENT DISORDER WITH MIXED ANXIETY AND DEPRESSED MOOD: ICD-10-CM

## 2023-08-22 DIAGNOSIS — N39.0 RECURRENT UTI: Primary | ICD-10-CM

## 2023-08-22 DIAGNOSIS — G47.33 OSA (OBSTRUCTIVE SLEEP APNEA): ICD-10-CM

## 2023-08-22 PROCEDURE — 81001 URINALYSIS AUTO W/SCOPE: CPT | Performed by: NURSE PRACTITIONER

## 2023-08-22 PROCEDURE — 99213 OFFICE O/P EST LOW 20 MIN: CPT | Mod: 95,,, | Performed by: NURSE PRACTITIONER

## 2023-08-22 PROCEDURE — 99213 PR OFFICE/OUTPT VISIT, EST, LEVL III, 20-29 MIN: ICD-10-PCS | Mod: 95,,, | Performed by: NURSE PRACTITIONER

## 2023-08-22 RX ORDER — NITROFURANTOIN 25; 75 MG/1; MG/1
100 CAPSULE ORAL 2 TIMES DAILY
Qty: 10 CAPSULE | Refills: 0 | Status: SHIPPED | OUTPATIENT
Start: 2023-08-22 | End: 2023-08-27

## 2023-08-22 NOTE — TELEPHONE ENCOUNTER
----- Message from Carl Morton NP sent at 8/22/2023  3:47 PM CDT -----  Good afternoon,    I have a virtual appointment with this pt today at 5. She thinks she may have a UTI. I have placed an order for a urinalysis. Please call and assist her in collecting a urine sample. Hopefully she can drop it off at an [facility] lab today. Thank you.

## 2023-08-22 NOTE — PATIENT INSTRUCTIONS
Medical [facility]itness--[phone]  Imaging, Xray, CT, MRI, Ultrasound---[phone]  Bariatrics---[phone]  Breast Surgery---[phone]  Case Management---[phone]  Colonoscopy---[phone]  DME---[phone]  Infectious Disease---[phone]  Interventional Radiology---[phone]  Medical Records---[phone]  [facility] On Call---[phone]  Optometry/Ophthalmology---[phone]  [facility]---[phone]  Physical Therapy---[phone]  Psychiatry---[phone] or [phone]  Plastic Surgery---[phone]  Recovery--[phone] option 2, or [phone].  Sleep Study---[phone]  Smoking Cessation---[phone]  Wound Care---[phone]  Referral Desk---[phone]

## 2023-08-23 ENCOUNTER — PATIENT MESSAGE (OUTPATIENT)
Dept: OTOLARYNGOLOGY | Facility: CLINIC | Age: 43
End: 2023-08-23
Payer: COMMERCIAL

## 2023-08-23 LAB
BACTERIA #/AREA URNS AUTO: NORMAL /HPF
BILIRUB UR QL STRIP: NEGATIVE
CLARITY UR REFRACT.AUTO: CLEAR
COLOR UR AUTO: ABNORMAL
GLUCOSE UR QL STRIP: NEGATIVE
HGB UR QL STRIP: NEGATIVE
KETONES UR QL STRIP: NEGATIVE
LEUKOCYTE ESTERASE UR QL STRIP: NEGATIVE
MICROSCOPIC COMMENT: NORMAL
NITRITE UR QL STRIP: POSITIVE
PH UR STRIP: 6 [PH] (ref 5–8)
PROT UR QL STRIP: NEGATIVE
RBC #/AREA URNS AUTO: 0 /HPF (ref 0–4)
SP GR UR STRIP: 1.02 (ref 1–1.03)
SQUAMOUS #/AREA URNS AUTO: 3 /HPF
URN SPEC COLLECT METH UR: ABNORMAL
WBC #/AREA URNS AUTO: 3 /HPF (ref 0–5)

## 2023-08-25 ENCOUNTER — OFFICE VISIT (OUTPATIENT)
Dept: UROLOGY | Facility: CLINIC | Age: 43
End: 2023-08-25
Payer: COMMERCIAL

## 2023-08-25 VITALS — WEIGHT: 232.56 LBS | BODY MASS INDEX: 35.36 KG/M2

## 2023-08-25 DIAGNOSIS — N39.0 RECURRENT UTI: Primary | ICD-10-CM

## 2023-08-25 DIAGNOSIS — Z87.442 HISTORY OF NEPHROLITHIASIS: ICD-10-CM

## 2023-08-25 PROBLEM — U07.1 COVID-19 VIRUS INFECTION: Status: RESOLVED | Noted: 2020-04-08 | Resolved: 2023-08-25

## 2023-08-25 PROCEDURE — 99204 OFFICE O/P NEW MOD 45 MIN: CPT | Mod: S$GLB,,, | Performed by: STUDENT IN AN ORGANIZED HEALTH CARE EDUCATION/TRAINING PROGRAM

## 2023-08-25 PROCEDURE — 99999 PR PBB SHADOW E&M-EST. PATIENT-LVL III: ICD-10-PCS | Mod: PBBFAC,,, | Performed by: STUDENT IN AN ORGANIZED HEALTH CARE EDUCATION/TRAINING PROGRAM

## 2023-08-25 PROCEDURE — 99999 PR PBB SHADOW E&M-EST. PATIENT-LVL III: CPT | Mod: PBBFAC,,, | Performed by: STUDENT IN AN ORGANIZED HEALTH CARE EDUCATION/TRAINING PROGRAM

## 2023-08-25 PROCEDURE — 99204 PR OFFICE/OUTPT VISIT, NEW, LEVL IV, 45-59 MIN: ICD-10-PCS | Mod: S$GLB,,, | Performed by: STUDENT IN AN ORGANIZED HEALTH CARE EDUCATION/TRAINING PROGRAM

## 2023-08-25 PROCEDURE — 87086 URINE CULTURE/COLONY COUNT: CPT | Performed by: STUDENT IN AN ORGANIZED HEALTH CARE EDUCATION/TRAINING PROGRAM

## 2023-08-25 NOTE — PROGRESS NOTES
Patient ID: Antonia Jeffers is a 43 y.o. female.    Chief Complaint: Other (Pt states she is having several bladder infections.)    Referral: Cherry Matute MD  [id number]  [place]  [place] [id number]     [facility]  43 y.o. who presents to the Urology clinic for evaluation of recurrent UTI. No recent urine cultures on file, last urine culture from 11/2015.   Patient has had nitrite positive urinalysis without cultures completed. She notes recurrence of symptoms after cessation of antibiotics. She drinks plenty of water, denies constipation. Notes partner occasionally slides out of vagina during intercourse. Denies vaginal dryness or pain with intercourse. She has experienced some perimenopause symptoms.   Remote hx of kidney stones, denies flank pain, hematuria, nausea, vomiting      Medically Necessary ROS documented in HPI    Past Medical History  Active Ambulatory Problems     Diagnosis Date Noted    Fibromyalgia 11/17/2014    Adjustment disorder with mixed anxiety and depressed mood 05/07/2015    Seasonal allergic rhinitis due to pollen 05/18/2017    Varicose veins of left lower extremity 06/14/2018    Intrauterine device 07/24/2018    VIOLETTA (obstructive sleep apnea): see HST 5/21, mild positional     Multiple thyroid nodules: stable 8/22; per radiology no need for FNA or follow-up 03/24/2022    Tinnitus of left ear 03/24/2022    Hyperparathyroidism: 3/22; however labs repeated 8/22 wnl.  Normal DEXA 4/22 03/25/2022     Resolved Ambulatory Problems     Diagnosis Date Noted    Obesity, Class II, BMI 35-39.9 11/17/2014    Obesity (BMI 30.0-34.9) 08/17/2017    Acute deep vein thrombosis (DVT) of femoral vein of left lower extremity 06/14/2018    Chronic deep vein thrombosis (DVT) of popliteal vein of left lower extremity: see notes from Dr Hernandez hematology 2018- okay to stop anticoagulation     COVID-19 virus infection: dx 4/7/20 04/08/2020    Situational stress 05/26/2020    Severe obesity (BMI 35.0-35.9 with  comorbidity) 2021    Gallstones: see CT 2021    Dyspnea     Snoring     Cholelithiasis 2021    History of DVT (deep vein thrombosis): see notes from hematology Dr Hernandez 2018- okay to stop anticoagulation     Left leg pain      Past Medical History:   Diagnosis Date    Abnormal Pap smear     GERD (gastroesophageal reflux disease)          Past Surgical History  Past Surgical History:   Procedure Laterality Date     SECTION      CHOLECYSTECTOMY      LAPAROSCOPIC CHOLECYSTECTOMY N/A 2021    Procedure: CHOLECYSTECTOMY, LAPAROSCOPIC;  Surgeon: Luis Rodriguez MD;  Location: [facility] OR [address] [place] [address];  Service: General;  Laterality: N/A;       Social History  Social Connections: Not on file       Medications    Current Outpatient Medications:     cyclobenzaprine (FLEXERIL) 10 MG tablet, Take 1 tablet (10 mg total) by mouth 3 (three) times daily., Disp: 30 tablet, Rfl: 3    FLAREX 0.1 % DrpS, , Disp: , Rfl:     fluticasone propionate (FLONASE) 50 mcg/actuation nasal spray, , Disp: , Rfl:     levonorgestreL (MIRENA) 20 mcg/24 hours (7 yrs) 52 mg IUD, 1 each by Intrauterine route once., Disp: , Rfl:     montelukast (SINGULAIR) 10 mg tablet, Take 1 tablet (10 mg total) by mouth every evening., Disp: 30 tablet, Rfl: 1    nitrofurantoin, macrocrystal-monohydrate, (MACROBID) 100 MG capsule, Take 1 capsule (100 mg total) by mouth 2 (two) times daily. for 5 days, Disp: 10 capsule, Rfl: 0    tretinoin (RETIN-A) 0.025 % cream, Apply 1 application topically nightly., Disp: , Rfl:     fluconazole (DIFLUCAN) 150 MG Tab, Take daily x 2 days, Disp: 2 tablet, Rfl: 1    ibuprofen (ADVIL,MOTRIN) 800 MG tablet, Take 800 mg by mouth 3 (three) times daily., Disp: , Rfl:     semaglutide (OZEMPIC) 2 mg/dose (8 mg/3 mL) PnIj, Inject 2 mg into the skin every 7 days., Disp: 3 mL, Rfl: 12    Allergies  Review of patient's allergies indicates:  No Known Allergies    Patient's PMH, FH, Social hx, Medications, allergies  reviewed and updated as pertinent to today's visit    Objective:      Physical Exam  Constitutional:       Appearance: She is well-developed.   HENT:      Head: Normocephalic and atraumatic.   Eyes:      Conjunctiva/sclera: Conjunctivae normal.   Pulmonary:      Effort: Pulmonary effort is normal. No respiratory distress.   Abdominal:      General: Abdomen is flat. There is no distension.      Palpations: Abdomen is soft.      Tenderness: There is no abdominal tenderness. There is no right CVA tenderness or left CVA tenderness.   Musculoskeletal:      Cervical back: Neck supple.   Skin:     General: Skin is warm.      Findings: No rash.   Neurological:      Mental Status: She is alert and oriented to person, place, and time.   Psychiatric:         Behavior: Behavior normal.             Assessment:       1. Recurrent UTI    2. History of nephrolithiasis        Plan:       Discussed with patient that only culture proven UTIs should be treated with antibiotics as many urinary symptoms mimic urinary tract infections. Excess exposure to antibiotics will contribute to multidrug resistance, increased risk of fungal infections, eliminate protective protective bacterial fernando and may be potentially harmful to the patient in the long run.      Discussed starting with cultures    CT renal stone ordered to eval for nidus given hx of stones

## 2023-08-27 LAB — BACTERIA UR CULT: NORMAL

## 2023-08-28 ENCOUNTER — PATIENT MESSAGE (OUTPATIENT)
Dept: INTERNAL MEDICINE | Facility: CLINIC | Age: 43
End: 2023-08-28
Payer: COMMERCIAL

## 2023-08-28 ENCOUNTER — HOSPITAL ENCOUNTER (OUTPATIENT)
Dept: RADIOLOGY | Facility: HOSPITAL | Age: 43
Discharge: HOME | End: 2023-08-28
Attending: STUDENT IN AN ORGANIZED HEALTH CARE EDUCATION/TRAINING PROGRAM
Payer: COMMERCIAL

## 2023-08-28 ENCOUNTER — PATIENT MESSAGE (OUTPATIENT)
Dept: UROLOGY | Facility: CLINIC | Age: 43
End: 2023-08-28
Payer: COMMERCIAL

## 2023-08-28 DIAGNOSIS — K76.0 FATTY LIVER: ICD-10-CM

## 2023-08-28 DIAGNOSIS — N39.0 RECURRENT UTI: ICD-10-CM

## 2023-08-28 DIAGNOSIS — K65.4 MESENTERIC PANNICULITIS: ICD-10-CM

## 2023-08-28 PROCEDURE — 74176 CT ABD & PELVIS W/O CONTRAST: CPT | Mod: TC

## 2023-08-28 PROCEDURE — 74176 CT ABD & PELVIS W/O CONTRAST: CPT | Mod: 26,,, | Performed by: INTERNAL MEDICINE

## 2023-08-28 PROCEDURE — 74176 CT RENAL STONE STUDY ABD PELVIS WO: ICD-10-PCS | Mod: 26,,, | Performed by: INTERNAL MEDICINE

## 2023-08-29 ENCOUNTER — TELEPHONE (OUTPATIENT)
Dept: UROLOGY | Facility: CLINIC | Age: 43
End: 2023-08-29
Payer: COMMERCIAL

## 2023-08-29 ENCOUNTER — PATIENT MESSAGE (OUTPATIENT)
Dept: INTERNAL MEDICINE | Facility: CLINIC | Age: 43
End: 2023-08-29
Payer: COMMERCIAL

## 2023-08-29 PROBLEM — K76.0 FATTY LIVER: Status: ACTIVE | Noted: 2023-08-29

## 2023-08-29 PROBLEM — K65.4 MESENTERIC PANNICULITIS: Status: ACTIVE | Noted: 2023-08-29

## 2023-08-29 NOTE — TELEPHONE ENCOUNTER
2 issues    Fatty liver- related to obesity.  Her liver labs have been fine.  Main treatment is to work on a low-fat, low carb eating plan, exercise and weight loss.  We monitor the liver labs regularly.  2.  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  I am not familiar with this condition.  It seems like there is some inflammation in the abdomen.  I would like for her to be seen in Gastro for this and a referral is in place.    Please assist with appt thanks    I also sent a [employer] message with this info

## 2023-08-30 NOTE — PROGRESS NOTES
HISTORY OF PRESENT ILLNESS:    Antonia Jeffers is a 43 y.o. female, , No LMP recorded. (Menstrual status: Birth Control).,  presents for a routine exam. She uses MIRENA (2018) for contraception. She does not want STD screening.  REPORTS GYN complaints.   Pt reports dysuria and pelvic pressure for a few months. States she has seen urology for this. Urology ordered CT to r/u kidney stones and she had GI findings. States urologist cancelled her follow up appointment. She also reports mixed stress/ urge incontinence.   Pt reports hot flashes every other day, lasting a few minutes, abdominal weight gain, and night sweats. Pt has mirena so rarely has spotting, last time in April.    The patient participates in regular exercise: Yes.  The patient does not smoke.  The patient wears seatbelts.   Pt denies any domestic violence. Yes -  tattoos or blood transfusions    SCREENING:   Pap:  NILM - HPV   Covid Vaccine Status:  INCOMPLETE  Mammogram:    TC: 12.58  Colonoscopy: N/A   DEXA:   NML      GYN FH:  Breast cancer: none  Colon cancer: none  Ovarian cancer: none  Endometrial cancer: none      Pt is a 40 y/o female  who presents for IUD check. Patient had a Mirena placed on (2018). She reports recent spotting. Also reports recent weight loss. Reports new underwear, noticed odor when wearing new underwear. Has since d/emilia underwear. States she feels like strings are longer. She is not having problems with bleeding, cramping, fever or discharge.  10/21 EK  ?HOT FLUSHES, FATIGUE, OILY SKIN.  FIRST NOTED SYMPTOMS APPROXIMATELY 2 MONTHS AGO.  HAS HAD MINIMAL MENSES WITH MIRENA AND LAST NOTED SPOTTING IN AUGUST.  DENIES MELENA.  SISTER IS PERIMENOPAUSAL, AGE 52 AND THIS IS THE NORM FOR WOMEN IN HER FAMILY.  WILL CHECK TSH AS WELL AS FSH AND ESTRADIOL, PLUS TESTOSTERONE FOR PATIENT WITH SIGNIFICANT ACNE.  DENIES MALE PATTERN HAIR GROWTH, EXCESS HAIR LOSS, CLOT OR MEGALY, DEEPENING OF HER  VOICE.  Last wwe 8/21 EK   COTEST SUBMITTED, MAMMO REF.  MIRENA PLACED 2018.  HAS HAD MINIMAL MENSES.  CYST ABOVE RIGHT NIPPLE INITIALLY INCREASED IN SIZE BEEN DECREASED.  REASSURED NO CHANGES ON MY EXAM.  HAD COVID BUT NOT VACCINE, PREVIOUSLY WAS ADVISED TO WAIT BY DR. HAJI (FIBROMYALGIA) - WILL RECHECK IN LIGHT OF CURRENT SURGE  LAST 10/2020  ?L BREAST MASS.  COTEST PLANNED 2021.  IUD CHECK - NO MENSES AND NO C/O.  REF DIAG L AND SCREENING R MAMMO  LAST 2018 FOLLOWING IUD AND PRIOR 2018:  PAP AND NECON REFILL.  REF MAMMO.  NO C/O  SURG [facility]   LAST VISIT 8/2017:   PAP DUE 2018.  REFILL OCP BUT WILL CHANGE TO NECON FOR BETTER CYCLE CONTROL - HAS HAD SOME BTB ON CURRENT PILL OVER THE PAST 6-7 MO  LAST VISIT 6/2016:   PAP DUE 2018 AND REFILL OCP - MONONESSA.  NO SIGNIF GYN ISSUES AT THIS TIME.  SWITCHING LAUNDRY DETERG AND FEELS IMPROVED FROM LAST YEAR.  TO [place] SOON AND ANNIVERSARY TRIP IN November TO [place] . .   LAST VISIT 5/2015:  SEVERAL ISSUES:  IN PAST HAD OV CYST, THINKS RECURRENT DUE TO TWINGES ON LEFT SIDE - IN PAST LUPIN HAD DX FUNCTIONAL CYSTS AND THIS FEELS SIMILAR. WILL F/U WITH PELVIC USG. DISCUSSED LOW-DOSE OCP SUPPRESS MOST BUT NOT ALL OVARIAN ACTIVITY; USUALLY DOES WELL WITH CURRENT OCP AND WILL NOT CHANGE, BUT IF RECURRENT EPISODES IN THE FUTURE MIGHT CONSIDER A STRONGER PILL  ABD PAIN, 2 EPISODES, 4 WEEKS AGO AND LAST WEEK, LASTED ABOUT 10 MIN, MIDLINE. HAS HX FIBROMYALGIA AND HARD TO TEASE APART WHAT THAT MIGHT BE CONTRIBUTING, BUT WILL CHECK PELVIC USG AND ADVIS CONT OCP  FEELS LIGHTHEADED ASSOCIATED WITH SEVERE PAIN EPISODES - DISCUSSED    IRREG MENSES PAST 2 MONTHS. LMP 5/5, 3-4 DAYS WITH 2 HEAVY. 4/EARLY ALSO HAD MENSES. HAD MIDCYCLE SPOTTING OVER THE PAST 2 MONTHS    URINE CULTURE SUBMITTED FOR UTI SX OVER THE PAST 3 DAYS; EMPIRIC ABX MACROBID  PAP SUBMITTED, DISCUSSED SCREENING  SPOUSE WITH RECENT INFIDELITY AND WANTS STD SCREEN FOR [facility] OF MIND       Past  Medical History:   Diagnosis Date    Abnormal Pap smear     ABN pap ~17 yo s/p laser cervix and since then all pap has been normal seen only Dr. Luna since    Acute deep vein thrombosis (DVT) of femoral vein of left lower extremity 2018    Cholelithiasis 2021    Chronic deep vein thrombosis (DVT) of popliteal vein of left lower extremity: see notes from Dr Hernandez hematology 2018- tyrell to stop anticoagulation     2018 diagnosed with DVT, presumably provoked as she was on OCP at the time per Dr. Hernandez's notes.  She completed at least 3 months of anticoagulation therapy.  FVL test negative.    COVID-19 virus infection: dx 20    Gallstones: see CT 2021    GERD (gastroesophageal reflux disease)     History of DVT (deep vein thrombosis): see notes from hematology Dr Hernandez - tyrell to stop anticoagulation     Seasonal allergic rhinitis due to pollen 2017       Past Surgical History:   Procedure Laterality Date     SECTION      CHOLECYSTECTOMY      LAPAROSCOPIC CHOLECYSTECTOMY N/A 2021    Procedure: CHOLECYSTECTOMY, LAPAROSCOPIC;  Surgeon: Luis Rodriguez MD;  Location: [facility] OR [address] [place] [address];  Service: General;  Laterality: N/A;       MEDICATIONS AND ALLERGIES:      Current Outpatient Medications:     FLAREX 0.1 % DrpS, , Disp: , Rfl:     levonorgestreL (MIRENA) 20 mcg/24 hours (7 yrs) 52 mg IUD, 1 each by Intrauterine route once., Disp: , Rfl:     tretinoin (RETIN-A) 0.025 % cream, Apply 1 application topically nightly., Disp: , Rfl:     Review of patient's allergies indicates:  No Known Allergies    Family History   Problem Relation Age of Onset    Cancer Mother         follicular lymphoma, thyroid; B lymphoma x 2    Lymphoma Mother 60    Diabetes Mother     Hypertension Mother     Depression Mother     Thyroid disease Mother     Heart disease Mother         A fib    Hyperlipidemia Mother     Diabetes Father     Hypertension Father     Heart disease Father          A fib; MI; PPM/defib    Thyroid disease Father     Hyperlipidemia Father     No Known Problems Sister     Clotting disorder Sister     Thyroid disease Sister     Factor V Leiden deficiency Sister     Deep vein thrombosis Sister     No Known Problems Son     Cancer Maternal Grandmother         Brain    Diabetes Maternal Grandmother     Cancer Other     Lymphoma Other     Breast cancer Neg Hx     Colon cancer Neg Hx     Ovarian cancer Neg Hx        Social History     Socioeconomic History    Marital status:     Number of children: 1   Tobacco Use    Smoking status: Never    Smokeless tobacco: Never   Substance and Sexual Activity    Alcohol use: No     Comment: rarely, 2-3 drinks a week maximum    Drug use: No    Sexual activity: Yes     Partners: Male     Birth control/protection: I.U.D.   Social History Narrative    ** Merged History Encounter **Family history of thyroid cancer mother         COMPREHENSIVE GYN HISTORY:    PAP History: Denies abnormal Paps.  Infection History: Denies STDs. Denies PID.  Benign History: Denies uterine fibroids. Denies ovarian cysts. Denies endometriosis. Denies other conditions.  Cancer History: Denies cervical cancer. Denies uterine cancer or hyperplasia. Denies ovarian cancer. Denies vulvar cancer or pre-cancer. Denies vaginal cancer or pre-cancer. Denies breast cancer. Denies colon cancer.  Sexual Activity History: Reports currently being sexually active  Menstrual History: occasional spotting   Contraception:IUD    ROS:  GENERAL: No weight changes. No swelling. No fatigue. No fever.  CARDIOVASCULAR: No chest pain. No shortness of breath. No leg cramps.   NEUROLOGICAL: No headaches. No vision changes.  BREASTS: No pain. No lumps. No discharge.  ABDOMEN: No pain. No nausea. No vomiting. No diarrhea. No constipation.  REPRODUCTIVE: No abnormal bleeding.   VULVA: No pain. No lesions. No itching.  VAGINA: No relaxation. No itching. No odor. No discharge. No lesions.  URINARY: see  HPI     /72   Wt 106.1 kg (234 lb 0.3 oz)   BMI 35.58 kg/m²     PE:  APPEARANCE: Well nourished, well developed, in no acute distress.  AFFECT: WNL, alert and oriented x 3.  SKIN: No acne or hirsutism.  NECK: Neck symmetric, without masses or thyromegaly.  NODES: No inguinal, cervical, axillary or femoral lymph node enlargement.  CHEST: Good respiratory effort.   ABDOMEN: Soft. No tenderness or masses. No hepatosplenomegaly. No hernias.  BREASTS: Symmetrical, no skin changes, visible lesions, palpable masses or nipple discharge bilaterally.  PELVIC: External female genitalia without lesions.  Female hair distribution. Adequate perineal body, Normal urethral meatus. Vagina moist and well rugated without lesions or discharge.  No significant cystocele or rectocele present. Cervix pink without lesions, discharge or tenderness IUD strings visualized at os- 1 cm out.. Uterus is normal size, regular, mobile and nontender. Adnexa without masses or tenderness.  EXTREMITIES: No edema      DIAGNOSIS:  1. Women's annual routine gynecological examination        2. Screening breast examination        3. Encounter for screening mammogram for breast cancer        4. Mixed incontinence  Ambulatory referral/consult to Urogynecology      5. Hot flashes  Follicle Stimulating Hormone    ESTRADIOL      6. IUD (intrauterine device) in place        7. Dysuria  Urine culture          PLAN:  - Pap due in 2 years.  - Screening tests as ordered.  - Diet and exercise encouraged.  Seat belt use encouraged.  Reviewed ASCCP Pap guidelines and screening recommendations.    Counseling: Healthy life style choices including diet and exercise, 1200 mg calcium and 600 IU until age 71 then 800 IU vitamin D supplementation daily, healthy sexual decision making, development of healthy and safe relationships.  The patient was counseled today on ACS PAP guidelines, with recommendations for yearly pelvic exams unless their uterus, cervix, and ovaries  were removed for benign reasons; in that case, examinations every 3-5 years are recommended.  The patient was also counseled regarding monthly breast self-examination, routine STD screening for at-risk populations, prophylactic immunizations for transmitted infections such as  HPV, Pertussis, or Influenza as appropriate, and yearly mammograms when indicated by ACS guidelines.  She was advised to see her primary care physician for all other health maintenance.    Bladder Irritants  Alcoholic beverages   Apples and apple juice  Cantaloupe    Carbonated beverages  Chili and spicy foods   Chocolate  Citrus fruit    Coffee (including decaffeinated)  Cranberries and cranberry juice Grapes  Guava     Milk Products: milk, cheese, cottage cheese, yogurt, ice cream  Peaches    Pineapple  Plums     Strawberries  Sugar especially artificial sweeteners, saccharin, aspartame, corn sweeteners, honey, fructose, sucrose, lactose  Tea     Tomatoes and tomato juice  Vitamin B complex   Vinegar  *Most people are not sensitive to ALL of these products; your goal is to find the foods that make YOUR symptoms worse     Certain foods and drinks have been associated with worsening symptoms of urinary frequency, urgency, urge incontinence, or bladder pain. If you suffer from any of these conditions, you may wish to try eliminating one or more of these foods from your diet and see if your symptoms improve. If bladder symptoms are related to dietary factors, strict adherence to a diet that eliminates the food should bring marked relief in 10 days. Once you are feeling better, you can begin to add foods back into your diet, one at a time. If symptoms return, you will be able to identify the irritant. As you add foods back to your diet it is very important that you drink significant amounts of water. Low-acid fruit substitutions include apricots, papaya, pears and watermelon. Coffee drinkers can drink Kava or other lowacid instant drinks. Tea  drinkers can substitute non-citrus herbal and sun brewed teas. Calcium carbonate co-buffered with calcium ascorbate can be substituted for Vitamin C. Prelief is a dietary supplement that works as an acid blocker for the bladder.       Non-Hormonal Menopausal Supplements:   First line of treatment for postmenopausal hot flushes is treatment with dietary and over-the-counter phytoestrogens such as soy-based products.  Estroven or Healthy Woman Soy formulations can reduce the number of and severity of hot flushes.  You can also add soy into your diet in the form of endemame, tofu, and soy milk.  Most patients report improvement but not complete resolution of their menopausal hot flushes and night sweats.  There are a variety of plant-based therapies used for hot flashes including isoflavones, phytoestrogens, and herbal therapies. Two types of isoflavones are found in soybeans, chickpeas, and lentils. Lignans (eg, enterolactone and enterodiol) are found in flaxseed, lentils, grains, fruits, and vegetables.   Black cohosh   Evening primrose oil     FOLLOW-UP with me annually.   Elizabeth Larson PA-C

## 2023-09-01 ENCOUNTER — PATIENT MESSAGE (OUTPATIENT)
Dept: UROLOGY | Facility: CLINIC | Age: 43
End: 2023-09-01
Payer: COMMERCIAL

## 2023-09-01 ENCOUNTER — OFFICE VISIT (OUTPATIENT)
Dept: OBSTETRICS AND GYNECOLOGY | Facility: CLINIC | Age: 43
End: 2023-09-01
Payer: COMMERCIAL

## 2023-09-01 ENCOUNTER — PATIENT MESSAGE (OUTPATIENT)
Dept: OBSTETRICS AND GYNECOLOGY | Facility: CLINIC | Age: 43
End: 2023-09-01

## 2023-09-01 VITALS — DIASTOLIC BLOOD PRESSURE: 72 MMHG | BODY MASS INDEX: 35.58 KG/M2 | WEIGHT: 234 LBS | SYSTOLIC BLOOD PRESSURE: 110 MMHG

## 2023-09-01 DIAGNOSIS — R23.2 HOT FLASHES: ICD-10-CM

## 2023-09-01 DIAGNOSIS — Z01.419 WOMEN'S ANNUAL ROUTINE GYNECOLOGICAL EXAMINATION: Primary | ICD-10-CM

## 2023-09-01 DIAGNOSIS — Z97.5 IUD (INTRAUTERINE DEVICE) IN PLACE: ICD-10-CM

## 2023-09-01 DIAGNOSIS — Z12.39 SCREENING BREAST EXAMINATION: ICD-10-CM

## 2023-09-01 DIAGNOSIS — Z12.31 ENCOUNTER FOR SCREENING MAMMOGRAM FOR BREAST CANCER: ICD-10-CM

## 2023-09-01 DIAGNOSIS — R30.0 DYSURIA: ICD-10-CM

## 2023-09-01 DIAGNOSIS — N39.46 MIXED INCONTINENCE: ICD-10-CM

## 2023-09-01 PROCEDURE — 99396 PREV VISIT EST AGE 40-64: CPT | Mod: S$GLB,,, | Performed by: PHYSICIAN ASSISTANT

## 2023-09-01 PROCEDURE — 99999 PR PBB SHADOW E&M-EST. PATIENT-LVL III: CPT | Mod: PBBFAC,,, | Performed by: PHYSICIAN ASSISTANT

## 2023-09-01 PROCEDURE — 87086 URINE CULTURE/COLONY COUNT: CPT | Performed by: PHYSICIAN ASSISTANT

## 2023-09-01 PROCEDURE — 99396 PR PREVENTIVE VISIT,EST,40-64: ICD-10-PCS | Mod: S$GLB,,, | Performed by: PHYSICIAN ASSISTANT

## 2023-09-01 PROCEDURE — 99999 PR PBB SHADOW E&M-EST. PATIENT-LVL III: ICD-10-PCS | Mod: PBBFAC,,, | Performed by: PHYSICIAN ASSISTANT

## 2023-09-01 NOTE — PATIENT INSTRUCTIONS
Non-Hormonal Menopausal Supplements:   First line of treatment for postmenopausal hot flushes is treatment with dietary and over-the-counter phytoestrogens such as soy-based products.  Estroven or Healthy Woman Soy formulations can reduce the number of and severity of hot flushes.  You can also add soy into your diet in the form of endemame, tofu, and soy milk.  Most patients report improvement but not complete resolution of their menopausal hot flushes and night sweats.  There are a variety of plant-based therapies used for hot flashes including isoflavones, phytoestrogens, and herbal therapies. Two types of isoflavones are found in soybeans, chickpeas, and lentils. Lignans (eg, enterolactone and enterodiol) are found in flaxseed, lentils, grains, fruits, and vegetables.   Black cohosh   Evening primrose oil     Bladder Irritants  Alcoholic beverages   Apples and apple juice  Cantaloupe    Carbonated beverages  Chili and spicy foods   Chocolate  Citrus fruit    Coffee (including decaffeinated)  Cranberries and cranberry juice Grapes  Guava     Milk Products: milk, cheese, cottage cheese, yogurt, ice cream  Peaches    Pineapple  Plums     Strawberries  Sugar especially artificial sweeteners, saccharin, aspartame, corn sweeteners, honey, fructose, sucrose, lactose  Tea     Tomatoes and tomato juice  Vitamin B complex   Vinegar  *Most people are not sensitive to ALL of these products; your goal is to find the foods that make YOUR symptoms worse     Certain foods and drinks have been associated with worsening symptoms of urinary frequency, urgency, urge incontinence, or bladder pain. If you suffer from any of these conditions, you may wish to try eliminating one or more of these foods from your diet and see if your symptoms improve. If bladder symptoms are related to dietary factors, strict adherence to a diet that eliminates the food should bring marked relief in 10 days. Once you are feeling better, you can begin to  add foods back into your diet, one at a time. If symptoms return, you will be able to identify the irritant. As you add foods back to your diet it is very important that you drink significant amounts of water. Low-acid fruit substitutions include apricots, papaya, pears and watermelon. Coffee drinkers can drink Kava or other lowacid instant drinks. Tea drinkers can substitute non-citrus herbal and sun brewed teas. Calcium carbonate co-buffered with calcium ascorbate can be substituted for Vitamin C. Prelief is a dietary supplement that works as an acid blocker for the bladder.     UTI Causes  Bladder infections are not contagious. You can't get one from someone else, from a toilet seat, or from sharing a bath. The most common cause of bladder infections is bacteria from the bowels. The bacteria get onto the skin around the opening of the urethra. From there, they can get into the urine and travel up to the bladder, causing inflammation and infection. This usually happens because of:  Wiping improperly after urinating. Always wipe from front to back.  Bowel incontinence  Pregnancy  Procedures such as having a catheter inserted  Older age  Not emptying your bladder. This can allow bacteria a chance to grow in your urine.  Dehydration  Constipation  Sex  Use of a diaphragm for birth control      UTI Care and prevention  These self-care steps can help prevent future infections:  Drink plenty of fluids to prevent dehydration and flush out your bladder. Do this unless you must restrict fluids for other health reasons, or your doctor told you not to.  Proper cleaning after going to the bathroom is important. Wipe from front to back after using the toilet to prevent the spread of bacteria.  Establish regular bladder habits. Urinate more often. Don't try to hold urine in for a long time.  Wear loose-fitting clothes and cotton underwear. Avoid tight-fitting pants.  Avoid vulvovaginal irritants  Improve your diet and prevent  constipation. Eat more fresh fruit and vegetables, and fiber, and less junk and fatty foods.  Avoid sex until your symptoms are gone.  Increase fluid intake but avoid caffeine, alcohol, and spicy foods. These can irritate your bladder.  Drink water prior to intercourse and urinate afterwards and avoid certain positions which could increase likelyhood of UTIs,  If you use birth control pills and have frequent bladder infections, discuss it with your doctor.  Signs of pylonephritis: Go to the ED if develop fever, chills, nausea, vomiting, back pain, worsening dyuria, hematuria  Pelvic rest until symptoms resolve    You can go to any [facility]. Below are the locations and hours of operation of [facility]:    [place] Locations:    [facility] - [address], [place] [address]  Hours of Operation: Monday-Friday, 7:00 am - 5:00 pm, Saturday, 8:00 a.m. - 12:00 p.m.    [facility] - [facility] [address], [place] [address]  Hours of Operation: Monday-Tuesday, 8:30 a.m. - 5:00 p.m.    [facility] - [address] [place] [address]  Hours of Operation: Monday-Friday, 7:00 a.m. - 7:00 p.m., Saturday, 8:00 a.m. - 4:30 p.m.    [facility] - Lab, 2nd Floor - [address] [place] [address]  Hours of Operation: Monday-Friday, 10:30 a.m. - 4:00 p.m.    [facility] - Lab, Building A, [address] [place] [address]  Hours of Operation: Monday-Friday, 7:00 a.m. - 5:00 p.m.    [facility] - [address] [facility], [address] [place] [address]  Hours of Operation: Monday-Friday, 7:00 a.m. - 5:00 p.m.,    [facility] [place] - [address] [address]  Hours of Operation: Monday-Friday, 7:00 a.m. - 4:00 p.m.    [facility] - [address] [place]  [address]  Hours of Operation: Monday-Friday, 7:00 a.m. - 5:00 p.m.    [facility] [place] - Lab, 5th Floor - [address], [place] [address]  Hours of Operation: Monday-Friday, 7:00 a.m. - 5:00 p.m., Saturday, 8:00 a.m. - 12:00 p.m.    [facility] - [address] [facility] [place] [address]  Hours of Operation: Monday-Friday, 8:00 a.m. - 5:00 p.m.    [facility] - [address] [facility] [place] [address]  Hours of Operation: Monday-Friday, 8:00 a.m. - 5:00 p.m.    [facility] - [address] [place] [address]  Hours of Operation: Monday-Friday, 10:30 a.m. - 4:00 p.m.    [facility] Locations:  [facility]  [address] [facility], [place] [address]  Hours of Operation: Monday-Friday, 10:30 a.m. - 4:30 p.m.    [facility] - [address] [place] [address]  Hours of Operation: Monday-Friday, 10:30 a.m. - 4:30 p.m.    [facility] - [address], [place] [address]  Monday-Thursday 10:30 a.m. - 4:30 p.m.    [facility] [place] - [address] [place] [address]  Monday-Thursday 10:30 a.m. - 4:30 p.m.    [facility] - [address] [place] [address]  Monday-Thursday 10:30 a.m. - 4:30 p.m.

## 2023-09-03 LAB — BACTERIA UR CULT: NORMAL

## 2023-09-18 ENCOUNTER — PATIENT MESSAGE (OUTPATIENT)
Dept: INTERNAL MEDICINE | Facility: CLINIC | Age: 43
End: 2023-09-18
Payer: COMMERCIAL

## 2023-10-06 ENCOUNTER — OFFICE VISIT (OUTPATIENT)
Dept: GASTROENTEROLOGY | Facility: CLINIC | Age: 43
End: 2023-10-06
Payer: COMMERCIAL

## 2023-10-06 VITALS — BODY MASS INDEX: 36.36 KG/M2 | HEIGHT: 68 IN | WEIGHT: 239.88 LBS

## 2023-10-06 DIAGNOSIS — R14.0 BLOATED ABDOMEN: ICD-10-CM

## 2023-10-06 DIAGNOSIS — Z12.11 SCREEN FOR COLON CANCER: ICD-10-CM

## 2023-10-06 DIAGNOSIS — K59.04 CHRONIC IDIOPATHIC CONSTIPATION: ICD-10-CM

## 2023-10-06 DIAGNOSIS — R10.13 DYSPEPSIA: ICD-10-CM

## 2023-10-06 DIAGNOSIS — K65.4 MESENTERIC PANNICULITIS: Primary | ICD-10-CM

## 2023-10-06 PROCEDURE — 99999 PR PBB SHADOW E&M-EST. PATIENT-LVL III: ICD-10-PCS | Mod: PBBFAC,,, | Performed by: INTERNAL MEDICINE

## 2023-10-06 PROCEDURE — 99204 OFFICE O/P NEW MOD 45 MIN: CPT | Mod: S$GLB,,, | Performed by: INTERNAL MEDICINE

## 2023-10-06 PROCEDURE — 99999 PR PBB SHADOW E&M-EST. PATIENT-LVL III: CPT | Mod: PBBFAC,,, | Performed by: INTERNAL MEDICINE

## 2023-10-06 PROCEDURE — 99204 PR OFFICE/OUTPT VISIT, NEW, LEVL IV, 45-59 MIN: ICD-10-PCS | Mod: S$GLB,,, | Performed by: INTERNAL MEDICINE

## 2023-10-06 RX ORDER — SODIUM, POTASSIUM,MAG SULFATES 17.5-3.13G
1 SOLUTION, RECONSTITUTED, ORAL ORAL DAILY
Qty: 1 KIT | Refills: 0 | Status: SHIPPED | OUTPATIENT
Start: 2023-10-06 | End: 2023-10-08

## 2023-10-06 NOTE — PROGRESS NOTES
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 Diagnoses of Dyspepsia, Bloated abdomen, Chronic idiopathic constipation, and Screen for colon cancer were also pertinent to this visit.  Referring provider/PCP: Cherry Matute MD    HPI:  Antonia Jeffers is a 43 y.o. female here today for abnormal CT imaging. New patient.  Hx fibromyalgia.    Here to discuss abnormal imaging. Seen incidentally on CT noncon because evaluating for bladder stone / kidney stone.   Symptoms of constipation and bloating, ongoing for many years, perhaps slightly worsening recently but overall not new.  No blood in stool, has BM maybe once a day. Some straing and hard to pass stools. Been taking miralax and trying to drink more water.  No vomiting, no abd pain. No diarrhea.    DVT - 2018 - was taking xarelto for 9 months (seemingly unprovoked) (sister has factor 5 leiden)  Lymphoma - mother.   Aunt with CRC  Gallstones - s/p kellie 2021  , symptomatic gallstones, felt better after.    Prior Endoscopy:  EGD: no   Colon: no    (Portions of this note were dictated using voice recognition software and may contain dictation related errors in spelling/grammar/syntax not found on text review)    Review of Systems   Constitutional:  Negative for fever and malaise/fatigue.   Respiratory:  Negative for cough and shortness of breath.    Cardiovascular:  Negative for chest pain and palpitations.   Gastrointestinal:  Negative for abdominal pain, blood in stool, nausea and vomiting.   Neurological:  Negative for dizziness and headaches.       Past Medical History: has a past medical history of Abnormal Pap smear, Acute deep vein thrombosis (DVT) of femoral vein of left lower extremity, Cholelithiasis, Chronic deep vein thrombosis (DVT) of popliteal vein of left lower extremity: see notes from Dr Hernandez hematology 2018- okay to stop anticoagulation, COVID-19 virus  "infection: dx 20, Gallstones: see CT , GERD (gastroesophageal reflux disease), History of DVT (deep vein thrombosis): see notes from hematology Dr Hernandez 2018- okay to stop anticoagulation, and Seasonal allergic rhinitis due to pollen.    Past Surgical History: has a past surgical history that includes  section; Laparoscopic cholecystectomy (N/A, 2021); and Cholecystectomy.    Family History:family history includes Cancer in her maternal grandmother, mother, and another family member; Clotting disorder in her sister; Deep vein thrombosis in her sister; Depression in her mother; Diabetes in her father, maternal grandmother, and mother; Factor V Leiden deficiency in her sister; Heart disease in her father and mother; Hyperlipidemia in her father and mother; Hypertension in her father and mother; Lymphoma in an other family member; Lymphoma (age of onset: 60) in her mother; No Known Problems in her sister and son; Thyroid disease in her father, mother, and sister.    Allergies: Review of patient's allergies indicates:  No Known Allergies    Social History: reports that she has never smoked. She has never used smokeless tobacco. She reports that she does not drink alcohol and does not use drugs.    Home medications:   Current Outpatient Medications on File Prior to Visit   Medication Sig Dispense Refill    levonorgestreL (MIRENA) 20 mcg/24 hours (7 yrs) 52 mg IUD 1 each by Intrauterine route once.      FLAREX 0.1 % DrpS       tretinoin (RETIN-A) 0.025 % cream Apply 1 application topically nightly.       No current facility-administered medications on file prior to visit.       Vital signs:  Ht 5' 8" (1.727 m)   Wt 108.8 kg (239 lb 13.8 oz)   BMI 36.47 kg/m²     Physical Exam  Vitals reviewed.   Constitutional:       General: She is not in acute distress.  HENT:      Head: Normocephalic and atraumatic.   Eyes:      General: No scleral icterus.     Conjunctiva/sclera: Conjunctivae normal.   Skin:     " General: Skin is warm.      Coloration: Skin is not pale.      Findings: No rash.   Neurological:      Mental Status: She is oriented to person, place, and time.      Gait: Gait normal.   Psychiatric:         Mood and Affect: Mood normal.         Behavior: Behavior normal.         I have reviewed prior labs, imaging, notes from last month    Assessment:  1. 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 Dyspepsia    3. Bloated abdomen    4. Chronic idiopathic constipation    5. Screen for colon cancer      We reviewed the imaging findings of mesenteric panniculitis.  We also reviewed her 2021 CT scan did not show any evidence of this.  We discussed that this is a rare entity with a somewhat unknown etiology.  We discussed that there can be infectious or other inflammatory bowel conditions that can be related, as well as other conditions, namely lymphoma although that seems to be very rare.  We usually follow this with interval imaging.  I do not believe treatment is warranted at this time given she is lacking any symptoms.    We do know that most of the time these findings will completely resolve on their own based on imaging.    Plan:  Orders Placed This Encounter    C-Reactive Protein    Comprehensive Metabolic Panel    CBC Auto Differential    Tissue Transglutaminase, IgA    IgA    sodium,potassium,mag sulfates (SUPREP BOWEL PREP KIT) 17.5-3.13-1.6 gram SolR    Case Request Endoscopy: EGD (ESOPHAGOGASTRODUODENOSCOPY), COLONOSCOPY       Plan EGD and colonoscopy for further eval, and colon for screening as she is due    Blood work     Repeat CT scan around end of year, 3 - 6 months or so    If patient develops dominant nodule or other more aggressive imaging findings, we would then consider to submit her for surgery  /biopsy.      RTC based on above.    Gustabo Tipton MD  [facility]

## 2023-10-06 NOTE — PATIENT INSTRUCTIONS
EGD Instructions    [facility] [address] Clinic Office [phone]  Endoscopy Lab [phone]    You are scheduled for an EGD with Dr. ELI  on  TBD at [facility] in [place].    Check in at the Hospital -1st floor, Information desk.   Call [phone] to reschedule.    You cannot have anything to eat or drink after Midnight. You can brush your teeth with a sip of water.     An adult friend/family member must come with you to drive you home.  You cannot drive, take a taxi, Uber/Lyft or bus to leave the Endoscopy Center alone.  If you do not have someone to drive you home, your test will be cancelled.     Please follow the directions of your doctor if you take any pills that thin your blood. If you take these meds: Aggrenox, Brilinta, Effient, Eliquis, Lovenox, Plavix, Pletal, Pradaxa, Ticilid, Xarelto or Coumadin, let the doctor's office know.    Please hold any GLP-1 medications prior to the procedure: Dulaglutide Trulicity(hold week prior), Exenatide Byetta (hold the morning of procedure), Semaglutide Ozempic (hold week prior), Liraglutide Victoza, Saxenda(hold week prior), Lixisenatide Adlyxin (hold the morning of procedure), Semaglutide Rybelsus (hold the morning of procedure), Tirzepatide Mounjaro (hold week prior)     DON'T: On the morning of the test do not take insulin or pills for diabetes.     DO: On the morning of the test, do take any pills for blood pressure, heart, anti-rejection and or seizures with a small sip of water. Bring any inhalers with you.    Leave all valuables and jewelry at home. You will be at the hospital for 2-4 hours.    Call the Endoscopy department at [phone] with any questions about your procedure.    Thank you for choosing [facility].     SUPREP Instructions    [facility] [address] Clinic Office [phone]  Endoscopy Lab [phone]    You are scheduled for a Colonoscopy with Dr. ELI  on TBD  at [facility] in [place].    Check in at the Hospital -1st floor, Information desk.   Call [phone] to reschedule.    An adult friend/family member must come with you to drive you home.  You cannot drive, take a taxi, Uber/Lyft or bus to leave the Endoscopy Center alone.  If you do not have someone to drive you home, your test will be cancelled.     Please follow the directions of your doctor if you take any pills that thin your blood. If you take these meds: Aggrenox, Brilinta, Effient, Eliquis, Lovenox, Plavix, Pletal, Pradaxa, Ticilid, Xarelto or Coumadin, let the doctor's office know.    Please hold any GLP-1 medications prior to the procedure: Dulaglutide Trulicity(hold week prior), Exenatide Byetta (hold the morning of procedure), Semaglutide Ozempic (hold week prior), Liraglutide Victoza, Saxenda(hold week prior), Lixisenatide Adlyxin (hold the morning of procedure), Semaglutide Rybelsus (hold the morning of procedure), Tirzepatide Mounjaro (hold week prior)     DON'T: On the morning of the test do not take insulin or pills for diabetes.     DO: On the morning of the test, do take any pills for blood pressure, heart, anti-rejection and or seizures with a small sip of water. Bring any inhalers with you.    To have a good prep, you must follow these instructions - please do not use the directions from the pharmacy.    The doctor will send a prescription for the SUPREP.      The Day Before the test:    You can only drink CLEAR LIQUIDS the whole day before your test.  You can't eat any food for the whole day.    You CAN have:  Water, Coffee or decaf coffee (no milk or cream)  Tea  Soft drinks - regular and sugar free  Jello (green or yellow)  Apple Juice, white grape juice, white cranberry juice  Gatorade, Power Aid, Crystal Light, Myke Aid  Lemonade and Limeade  Bouillon, clear soup  Snowball, popsicles  YOU CAN'T DRINK ANYTHING RED, PURPLE ORANGE OR BLUE   YOU CAN'T DRINK ALCOHOL  ONLY  DRINK WHAT IS ON THE LIST      At 5 pm the night before your test:    Pour the 1st bottle of SUPREP into the cup provided in the box. Add water to the line on the cup and mix well.  Drink the whole cup and then drink 2 more full cups of water over 1 hour.  This can be easier to drink if it is cold. You can mix it 20 minutes ahead of time and place in the refrigerator before you drink it.  You must drink it within 30-45 minutes of mixing it.  Do NOT pour the drink over ice.  You can drink it with a straw.    The Day of the test - We will call you 2 days before your test to tell you what time to get there.    5 hours before you come to the hospital (this may be in the middle of the night)  Pour the 2nd bottle of SUPREP into the cup provided in the box. Add water to the line on the cup and mix well.  Drink the whole cup and then drink 2 more full cups of water over 1 hour.  It might be easier to drink if it is cold. You can mix it 20 minutes ahead of time and place in the refrigerator before you drink it.  You must drink it within 30-45 minutes of mixing it.  Do NOT pour the drink over ice.  You can drink it with a straw.    YOU CAN'T EAT OR DRINK ANYTHING ELSE ONCE YOU FINISH THE PREP    Leave all valuables and jewelry at home. You will be at the hospital for 2-4 hours.    Call the Endoscopy department at [phone] with any questions about your procedure.

## 2023-10-17 ENCOUNTER — TELEPHONE (OUTPATIENT)
Dept: GASTROENTEROLOGY | Facility: CLINIC | Age: 43
End: 2023-10-17
Payer: COMMERCIAL

## 2023-10-26 ENCOUNTER — TELEPHONE (OUTPATIENT)
Dept: GASTROENTEROLOGY | Facility: CLINIC | Age: 43
End: 2023-10-26
Payer: COMMERCIAL

## 2023-12-12 ENCOUNTER — HOSPITAL ENCOUNTER (OUTPATIENT)
Dept: RADIOLOGY | Facility: HOSPITAL | Age: 43
Discharge: HOME | End: 2023-12-12
Attending: INTERNAL MEDICINE
Payer: COMMERCIAL

## 2023-12-12 VITALS — BODY MASS INDEX: 36.22 KG/M2 | WEIGHT: 239 LBS | HEIGHT: 68 IN

## 2023-12-12 DIAGNOSIS — Z12.31 SCREENING MAMMOGRAM, ENCOUNTER FOR: ICD-10-CM

## 2023-12-12 PROCEDURE — 77063 BREAST TOMOSYNTHESIS BI: CPT | Mod: 26,,, | Performed by: RADIOLOGY

## 2023-12-12 PROCEDURE — 77067 SCR MAMMO BI INCL CAD: CPT | Mod: TC

## 2023-12-12 PROCEDURE — 77067 SCR MAMMO BI INCL CAD: CPT | Mod: 26,,, | Performed by: RADIOLOGY

## 2023-12-12 PROCEDURE — 77067 MAMMO DIGITAL SCREENING BILAT WITH TOMOSYNTHESIS_CAD: ICD-10-PCS | Mod: 26,,, | Performed by: RADIOLOGY

## 2023-12-12 PROCEDURE — 77063 MAMMO DIGITAL SCREENING BILAT WITH TOMOSYNTHESIS_CAD: ICD-10-PCS | Mod: 26,,, | Performed by: RADIOLOGY

## 2023-12-17 ENCOUNTER — PATIENT MESSAGE (OUTPATIENT)
Dept: INTERNAL MEDICINE | Facility: CLINIC | Age: 43
End: 2023-12-17
Payer: COMMERCIAL

## 2023-12-18 NOTE — TELEPHONE ENCOUNTER
Okay to let her know that I do see the mammogram was completed but they are backed up and the results are not done yet.  She will be notified as soon as they are available

## 2024-01-08 ENCOUNTER — TELEPHONE (OUTPATIENT)
Dept: GASTROENTEROLOGY | Facility: HOSPITAL | Age: 44
End: 2024-01-08
Payer: COMMERCIAL

## 2024-01-08 DIAGNOSIS — K56.7 ILEUS OF UNSPECIFIED TYPE: Primary | ICD-10-CM

## 2024-01-08 NOTE — TELEPHONE ENCOUNTER
I set reminder to repeat her abdominal imaging around this time  She has not yet done the endoscopy either    Thus, we should offer a follow up visit and if follow up is scheduled, we can then repeat the imaging.  Thus we need to schedule follow up and schedule her CT (only if she has a follow up appt)  Creatine blood work ordered and preg test ordered before ct scan

## 2024-01-08 NOTE — TELEPHONE ENCOUNTER
----- Message from Gustabo Tipton MD sent at 10/6/2023 12:44 PM CDT -----  Regarding: ct entero end of year  Mesenteric panniculuits

## 2024-03-04 ENCOUNTER — TELEPHONE (OUTPATIENT)
Dept: GASTROENTEROLOGY | Facility: CLINIC | Age: 44
End: 2024-03-04
Payer: COMMERCIAL

## 2024-03-04 NOTE — TELEPHONE ENCOUNTER
Attempted to contact pt to get an update on symptoms. Left vm and call back number    ----- Message from Erik Caraballo MA sent at 1/8/2024  4:26 PM CST -----   set reminder to repeat her abdominal imaging around this time  She has not yet done the endoscopy either     Thus, we should offer a follow up visit and if follow up is scheduled, we can then repeat the imaging.  Thus we need to schedule follow up and schedule her CT (only if she has a follow up appt)  Creatine blood work ordered and preg test ordered before ct scan

## 2024-03-13 ENCOUNTER — LAB VISIT (OUTPATIENT)
Dept: LAB | Facility: HOSPITAL | Age: 44
End: 2024-03-13
Payer: COMMERCIAL

## 2024-03-13 ENCOUNTER — OFFICE VISIT (OUTPATIENT)
Dept: INTERNAL MEDICINE | Facility: CLINIC | Age: 44
End: 2024-03-13
Payer: COMMERCIAL

## 2024-03-13 ENCOUNTER — PATIENT MESSAGE (OUTPATIENT)
Dept: INTERNAL MEDICINE | Facility: CLINIC | Age: 44
End: 2024-03-13

## 2024-03-13 VITALS
SYSTOLIC BLOOD PRESSURE: 124 MMHG | HEIGHT: 68 IN | WEIGHT: 226.44 LBS | HEART RATE: 77 BPM | BODY MASS INDEX: 34.32 KG/M2 | DIASTOLIC BLOOD PRESSURE: 90 MMHG | OXYGEN SATURATION: 97 %

## 2024-03-13 DIAGNOSIS — R42 DIZZINESS: ICD-10-CM

## 2024-03-13 DIAGNOSIS — R42 DIZZINESS: Primary | ICD-10-CM

## 2024-03-13 DIAGNOSIS — G44.209 ACUTE NON INTRACTABLE TENSION-TYPE HEADACHE: ICD-10-CM

## 2024-03-13 LAB
ALBUMIN SERPL BCP-MCNC: 4.4 G/DL (ref 3.5–5.2)
ALP SERPL-CCNC: 55 U/L (ref 55–135)
ALT SERPL W/O P-5'-P-CCNC: 25 U/L (ref 10–44)
ANION GAP SERPL CALC-SCNC: 8 MMOL/L (ref 8–16)
AST SERPL-CCNC: 22 U/L (ref 10–40)
BASOPHILS # BLD AUTO: 0.04 K/UL (ref 0–0.2)
BASOPHILS NFR BLD: 0.7 % (ref 0–1.9)
BILIRUB SERPL-MCNC: 0.3 MG/DL (ref 0.1–1)
BUN SERPL-MCNC: 11 MG/DL (ref 6–20)
CALCIUM SERPL-MCNC: 10.3 MG/DL (ref 8.7–10.5)
CHLORIDE SERPL-SCNC: 106 MMOL/L (ref 95–110)
CO2 SERPL-SCNC: 27 MMOL/L (ref 23–29)
CREAT SERPL-MCNC: 0.8 MG/DL (ref 0.5–1.4)
DIFFERENTIAL METHOD BLD: NORMAL
EOSINOPHIL # BLD AUTO: 0.1 K/UL (ref 0–0.5)
EOSINOPHIL NFR BLD: 1.4 % (ref 0–8)
ERYTHROCYTE [DISTWIDTH] IN BLOOD BY AUTOMATED COUNT: 13.2 % (ref 11.5–14.5)
EST. GFR  (NO RACE VARIABLE): >60 ML/MIN/1.73 M^2
ESTIMATED AVG GLUCOSE: 100 MG/DL (ref 68–131)
GLUCOSE SERPL-MCNC: 87 MG/DL (ref 70–110)
HBA1C MFR BLD: 5.1 % (ref 4–5.6)
HCT VFR BLD AUTO: 43.1 % (ref 37–48.5)
HGB BLD-MCNC: 13.9 G/DL (ref 12–16)
IMM GRANULOCYTES # BLD AUTO: 0.02 K/UL (ref 0–0.04)
IMM GRANULOCYTES NFR BLD AUTO: 0.3 % (ref 0–0.5)
LYMPHOCYTES # BLD AUTO: 2.5 K/UL (ref 1–4.8)
LYMPHOCYTES NFR BLD: 42.8 % (ref 18–48)
MCH RBC QN AUTO: 29.9 PG (ref 27–31)
MCHC RBC AUTO-ENTMCNC: 32.3 G/DL (ref 32–36)
MCV RBC AUTO: 93 FL (ref 82–98)
MONOCYTES # BLD AUTO: 0.4 K/UL (ref 0.3–1)
MONOCYTES NFR BLD: 7 % (ref 4–15)
NEUTROPHILS # BLD AUTO: 2.8 K/UL (ref 1.8–7.7)
NEUTROPHILS NFR BLD: 47.8 % (ref 38–73)
NRBC BLD-RTO: 0 /100 WBC
PLATELET # BLD AUTO: 325 K/UL (ref 150–450)
PMV BLD AUTO: 10.8 FL (ref 9.2–12.9)
POTASSIUM SERPL-SCNC: 4 MMOL/L (ref 3.5–5.1)
PROT SERPL-MCNC: 7.9 G/DL (ref 6–8.4)
RBC # BLD AUTO: 4.65 M/UL (ref 4–5.4)
SODIUM SERPL-SCNC: 141 MMOL/L (ref 136–145)
WBC # BLD AUTO: 5.86 K/UL (ref 3.9–12.7)

## 2024-03-13 PROCEDURE — 99214 OFFICE O/P EST MOD 30 MIN: CPT | Mod: S$GLB,,, | Performed by: NURSE PRACTITIONER

## 2024-03-13 PROCEDURE — 99999 PR PBB SHADOW E&M-EST. PATIENT-LVL III: CPT | Mod: PBBFAC,,, | Performed by: NURSE PRACTITIONER

## 2024-03-13 PROCEDURE — 85025 COMPLETE CBC W/AUTO DIFF WBC: CPT | Performed by: NURSE PRACTITIONER

## 2024-03-13 PROCEDURE — 80053 COMPREHEN METABOLIC PANEL: CPT | Performed by: NURSE PRACTITIONER

## 2024-03-13 PROCEDURE — 36415 COLL VENOUS BLD VENIPUNCTURE: CPT | Performed by: NURSE PRACTITIONER

## 2024-03-13 PROCEDURE — 83036 HEMOGLOBIN GLYCOSYLATED A1C: CPT | Performed by: NURSE PRACTITIONER

## 2024-03-13 RX ORDER — MECLIZINE HYDROCHLORIDE 25 MG/1
25 TABLET ORAL 3 TIMES DAILY PRN
Qty: 60 TABLET | Refills: 1 | Status: SHIPPED | OUTPATIENT
Start: 2024-03-13

## 2024-03-13 NOTE — PROGRESS NOTES
"Subjective     Patient ID: Antonia Jeffers is a 43 y.o. female.  BP (!) 124/90 (BP Location: Right arm, Patient Position: Sitting)   Pulse 77   Ht 5' 8" (1.727 m)   Wt 102.7 kg (226 lb 6.6 oz)   SpO2 97%   BMI 34.43 kg/m²      Chief Complaint: Dizziness and Headache    Presenting for dizzy spells that began Wednesday of last week out of no where, not associated with position changes. She Suddenly felt like the room was spinning and became lightheaded, hot/cold, and was seeing spots. The event was then followed by a headache with bandlike pressure around her head; no photophobia, nausea. Headache helped by Excedrin migraine. She had a second episode that occurred on Friday of last week, minus seeing spots. During the second episode she took her BP and it was 125/80; she notes that her BP usually runs 100/70. She also took her BS and it was 121; she had not had anything to eat at that time. She had a third episode that occurred this morning, without seeing spots, which prompted her to come to clinic today. She has not recently been sick.     Patient Active Problem List   Diagnosis    Fibromyalgia    Adjustment disorder with mixed anxiety and depressed mood    Seasonal allergic rhinitis due to pollen    Varicose veins of left lower extremity    Intrauterine device    VIOLETTA (obstructive sleep apnea): see HST 5/21, mild positional    Multiple thyroid nodules: stable 8/22; per radiology no need for FNA or follow-up    Tinnitus of left ear    Hyperparathyroidism: 3/22; however labs repeated 8/22 wnl.  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      FLAREX 0.1 % DrpS       meclizine (ANTIVERT) 25 mg tablet Take 1 tablet (25 mg total) by mouth 3 (three) times daily as needed for Dizziness. 60 " tablet 1    tretinoin (RETIN-A) 0.025 % cream Apply 1 application topically nightly.       No current facility-administered medications for this visit.        Review of Systems   Eyes:  Positive for visual disturbance. Negative for photophobia.   Neurological:  Positive for dizziness, light-headedness and headaches. Negative for syncope.   All other systems reviewed and are negative.         Objective     Physical Exam  Constitutional:       Appearance: Normal appearance.   HENT:      Head: Normocephalic and atraumatic.      Right Ear: Hearing, tympanic membrane, ear canal and external ear normal.      Left Ear: Hearing, tympanic membrane, ear canal and external ear normal.      Nose: Nose normal. No congestion or rhinorrhea.      Right Sinus: No maxillary sinus tenderness or frontal sinus tenderness.      Left Sinus: No maxillary sinus tenderness or frontal sinus tenderness.      Mouth/Throat:      Mouth: Mucous membranes are moist.      Pharynx: Oropharynx is clear.   Eyes:      Conjunctiva/sclera: Conjunctivae normal.      Pupils: Pupils are equal, round, and reactive to light.   Cardiovascular:      Rate and Rhythm: Normal rate.   Pulmonary:      Effort: Pulmonary effort is normal.   Abdominal:      General: Abdomen is flat.   Musculoskeletal:         General: Normal range of motion.      Cervical back: Normal range of motion.   Skin:     General: Skin is warm and dry.   Neurological:      General: No focal deficit present.      Mental Status: She is alert and oriented to person, place, and time.      Cranial Nerves: No cranial nerve deficit.      Sensory: No sensory deficit.      Motor: No weakness.      Gait: Gait normal.      Comments: Negative sunil halpike bilaterally, hints negative    Psychiatric:         Mood and Affect: Mood normal.         Behavior: Behavior normal.        Assessment and Plan     1. Dizziness; headaches associated with dizziness, will obtain labs to evaluate for underlying causes, ENT  referral for further evaluation   -     meclizine (ANTIVERT) 25 mg tablet; Take 1 tablet (25 mg total) by mouth 3 (three) times daily as needed for Dizziness.  Dispense: 60 tablet; Refill: 1  -     Ambulatory referral/consult to ENT; Future; Expected date: 03/20/2024  -     CBC W/ AUTO DIFFERENTIAL; Future; Expected date: 03/13/2024  -     HEMOGLOBIN A1C; Future; Expected date: 03/13/2024  -     COMPREHENSIVE METABOLIC PANEL; Future; Expected date: 03/13/2024    2. Acute non intractable tension-type headache; headaches associated with dizziness, will obtain labs to evaluate for underlying causes, ENT referral for further evaluation   -     Ambulatory referral/consult to ENT; Future; Expected date: 03/20/2024  -     CBC W/ AUTO DIFFERENTIAL; Future; Expected date: 03/13/2024  -     HEMOGLOBIN A1C; Future; Expected date: 03/13/2024  -     COMPREHENSIVE METABOLIC PANEL; Future; Expected date: 03/13/2024  -     Ibuprofen otc 2-400mg q 8hr prn headaches   -     Hydrate well           Gustabo Stephenson NP   Internal Medicine           Follow up if symptoms worsen or fail to improve.

## 2024-03-14 ENCOUNTER — PATIENT MESSAGE (OUTPATIENT)
Dept: INTERNAL MEDICINE | Facility: CLINIC | Age: 44
End: 2024-03-14
Payer: COMMERCIAL

## 2024-03-14 DIAGNOSIS — R51.9 ACUTE NONINTRACTABLE HEADACHE, UNSPECIFIED HEADACHE TYPE: ICD-10-CM

## 2024-03-14 DIAGNOSIS — R42 DIZZINESS: Primary | ICD-10-CM

## 2024-03-14 DIAGNOSIS — R42 DIZZINESS AND GIDDINESS: ICD-10-CM

## 2024-03-15 NOTE — TELEPHONE ENCOUNTER
Based on my conversation with Gustabo in the fact that she just saw him and has an MRI scheduled, I do not think she needs another office visit.  Next step would be ENT.  I know she has an appointment but not until June, she should check on the cancellation list for that

## 2024-04-08 ENCOUNTER — HOSPITAL ENCOUNTER (OUTPATIENT)
Dept: RADIOLOGY | Facility: HOSPITAL | Age: 44
Discharge: HOME | End: 2024-04-08
Attending: NURSE PRACTITIONER
Payer: COMMERCIAL

## 2024-04-08 DIAGNOSIS — R51.9 ACUTE NONINTRACTABLE HEADACHE, UNSPECIFIED HEADACHE TYPE: ICD-10-CM

## 2024-04-08 DIAGNOSIS — R42 DIZZINESS: ICD-10-CM

## 2024-04-08 PROCEDURE — 25500020 PHARM REV CODE 255: Performed by: NURSE PRACTITIONER

## 2024-04-08 PROCEDURE — 70553 MRI BRAIN STEM W/O & W/DYE: CPT | Mod: TC

## 2024-04-08 PROCEDURE — 70553 MRI BRAIN STEM W/O & W/DYE: CPT | Mod: 26,,, | Performed by: RADIOLOGY

## 2024-04-08 PROCEDURE — A9585 GADOBUTROL INJECTION: HCPCS | Performed by: NURSE PRACTITIONER

## 2024-04-08 RX ORDER — GADOBUTROL 604.72 MG/ML
10 INJECTION INTRAVENOUS
Status: COMPLETED | OUTPATIENT
Start: 2024-04-08 | End: 2024-04-08

## 2024-04-08 RX ADMIN — GADOBUTROL 10 ML: 604.72 INJECTION INTRAVENOUS at 07:04

## 2024-04-23 ENCOUNTER — TELEPHONE (OUTPATIENT)
Dept: OTOLARYNGOLOGY | Facility: CLINIC | Age: 44
End: 2024-04-23
Payer: COMMERCIAL

## 2024-05-03 ENCOUNTER — LAB VISIT (OUTPATIENT)
Dept: LAB | Facility: HOSPITAL | Age: 44
End: 2024-05-03
Payer: COMMERCIAL

## 2024-05-03 ENCOUNTER — OFFICE VISIT (OUTPATIENT)
Dept: INTERNAL MEDICINE | Facility: CLINIC | Age: 44
End: 2024-05-03
Payer: COMMERCIAL

## 2024-05-03 VITALS
DIASTOLIC BLOOD PRESSURE: 70 MMHG | BODY MASS INDEX: 34.4 KG/M2 | HEIGHT: 68 IN | SYSTOLIC BLOOD PRESSURE: 120 MMHG | WEIGHT: 227 LBS

## 2024-05-03 DIAGNOSIS — Z00.00 ANNUAL PHYSICAL EXAM: Primary | ICD-10-CM

## 2024-05-03 DIAGNOSIS — Z12.11 COLON CANCER SCREENING: ICD-10-CM

## 2024-05-03 DIAGNOSIS — G47.33 OSA (OBSTRUCTIVE SLEEP APNEA): ICD-10-CM

## 2024-05-03 DIAGNOSIS — E66.09 CLASS 1 OBESITY DUE TO EXCESS CALORIES WITHOUT SERIOUS COMORBIDITY WITH BODY MASS INDEX (BMI) OF 34.0 TO 34.9 IN ADULT: ICD-10-CM

## 2024-05-03 DIAGNOSIS — K65.4 MESENTERIC PANNICULITIS: ICD-10-CM

## 2024-05-03 DIAGNOSIS — Z00.00 ANNUAL PHYSICAL EXAM: ICD-10-CM

## 2024-05-03 DIAGNOSIS — K76.0 FATTY LIVER: ICD-10-CM

## 2024-05-03 DIAGNOSIS — K56.7 ILEUS OF UNSPECIFIED TYPE: ICD-10-CM

## 2024-05-03 PROBLEM — E66.811 CLASS 1 OBESITY DUE TO EXCESS CALORIES WITHOUT SERIOUS COMORBIDITY WITH BODY MASS INDEX (BMI) OF 34.0 TO 34.9 IN ADULT: Status: ACTIVE | Noted: 2024-05-03

## 2024-05-03 LAB
ALBUMIN SERPL BCP-MCNC: 4.5 G/DL (ref 3.5–5.2)
ALP SERPL-CCNC: 60 U/L (ref 55–135)
ALT SERPL W/O P-5'-P-CCNC: 24 U/L (ref 10–44)
ANION GAP SERPL CALC-SCNC: 10 MMOL/L (ref 8–16)
AST SERPL-CCNC: 26 U/L (ref 10–40)
BASOPHILS # BLD AUTO: 0.03 K/UL (ref 0–0.2)
BASOPHILS NFR BLD: 0.7 % (ref 0–1.9)
BILIRUB SERPL-MCNC: 0.4 MG/DL (ref 0.1–1)
BUN SERPL-MCNC: 15 MG/DL (ref 6–20)
CALCIUM SERPL-MCNC: 10.4 MG/DL (ref 8.7–10.5)
CHLORIDE SERPL-SCNC: 102 MMOL/L (ref 95–110)
CHOLEST SERPL-MCNC: 206 MG/DL (ref 120–199)
CHOLEST/HDLC SERPL: 2.6 {RATIO} (ref 2–5)
CO2 SERPL-SCNC: 26 MMOL/L (ref 23–29)
CREAT SERPL-MCNC: 1 MG/DL (ref 0.5–1.4)
CREAT SERPL-MCNC: 1 MG/DL (ref 0.5–1.4)
CRP SERPL-MCNC: 18.6 MG/L (ref 0–8.2)
DIFFERENTIAL METHOD BLD: ABNORMAL
EOSINOPHIL # BLD AUTO: 0 K/UL (ref 0–0.5)
EOSINOPHIL NFR BLD: 1 % (ref 0–8)
ERYTHROCYTE [DISTWIDTH] IN BLOOD BY AUTOMATED COUNT: 12.7 % (ref 11.5–14.5)
EST. GFR  (NO RACE VARIABLE): >60 ML/MIN/1.73 M^2
EST. GFR  (NO RACE VARIABLE): >60 ML/MIN/1.73 M^2
GLUCOSE SERPL-MCNC: 75 MG/DL (ref 70–110)
HCT VFR BLD AUTO: 44.5 % (ref 37–48.5)
HDLC SERPL-MCNC: 80 MG/DL (ref 40–75)
HDLC SERPL: 38.8 % (ref 20–50)
HGB BLD-MCNC: 14.4 G/DL (ref 12–16)
IGA SERPL-MCNC: 166 MG/DL (ref 40–350)
IMM GRANULOCYTES # BLD AUTO: 0.01 K/UL (ref 0–0.04)
IMM GRANULOCYTES NFR BLD AUTO: 0.2 % (ref 0–0.5)
LDLC SERPL CALC-MCNC: 115.8 MG/DL (ref 63–159)
LYMPHOCYTES # BLD AUTO: 1.4 K/UL (ref 1–4.8)
LYMPHOCYTES NFR BLD: 34.4 % (ref 18–48)
MCH RBC QN AUTO: 29.3 PG (ref 27–31)
MCHC RBC AUTO-ENTMCNC: 32.4 G/DL (ref 32–36)
MCV RBC AUTO: 91 FL (ref 82–98)
MONOCYTES # BLD AUTO: 0.7 K/UL (ref 0.3–1)
MONOCYTES NFR BLD: 17.5 % (ref 4–15)
NEUTROPHILS # BLD AUTO: 1.9 K/UL (ref 1.8–7.7)
NEUTROPHILS NFR BLD: 46.2 % (ref 38–73)
NONHDLC SERPL-MCNC: 126 MG/DL
NRBC BLD-RTO: 0 /100 WBC
PLATELET # BLD AUTO: 257 K/UL (ref 150–450)
PMV BLD AUTO: 10.9 FL (ref 9.2–12.9)
POTASSIUM SERPL-SCNC: 4 MMOL/L (ref 3.5–5.1)
PROT SERPL-MCNC: 8.2 G/DL (ref 6–8.4)
RBC # BLD AUTO: 4.91 M/UL (ref 4–5.4)
SODIUM SERPL-SCNC: 138 MMOL/L (ref 136–145)
TRIGL SERPL-MCNC: 51 MG/DL (ref 30–150)
TSH SERPL DL<=0.005 MIU/L-ACNC: 1.12 UIU/ML (ref 0.4–4)
WBC # BLD AUTO: 4.01 K/UL (ref 3.9–12.7)

## 2024-05-03 PROCEDURE — 86140 C-REACTIVE PROTEIN: CPT | Performed by: INTERNAL MEDICINE

## 2024-05-03 PROCEDURE — 99396 PREV VISIT EST AGE 40-64: CPT | Mod: S$GLB,,, | Performed by: INTERNAL MEDICINE

## 2024-05-03 PROCEDURE — 36415 COLL VENOUS BLD VENIPUNCTURE: CPT | Performed by: INTERNAL MEDICINE

## 2024-05-03 PROCEDURE — 82784 ASSAY IGA/IGD/IGG/IGM EACH: CPT | Performed by: INTERNAL MEDICINE

## 2024-05-03 PROCEDURE — 84443 ASSAY THYROID STIM HORMONE: CPT | Performed by: INTERNAL MEDICINE

## 2024-05-03 PROCEDURE — 80053 COMPREHEN METABOLIC PANEL: CPT | Performed by: INTERNAL MEDICINE

## 2024-05-03 PROCEDURE — 85025 COMPLETE CBC W/AUTO DIFF WBC: CPT | Performed by: INTERNAL MEDICINE

## 2024-05-03 PROCEDURE — 99999 PR PBB SHADOW E&M-EST. PATIENT-LVL IV: CPT | Mod: PBBFAC,,, | Performed by: INTERNAL MEDICINE

## 2024-05-03 PROCEDURE — 80061 LIPID PANEL: CPT | Performed by: INTERNAL MEDICINE

## 2024-05-03 PROCEDURE — 86364 TISS TRNSGLTMNASE EA IG CLAS: CPT | Performed by: INTERNAL MEDICINE

## 2024-05-03 RX ORDER — CYCLOBENZAPRINE HCL 10 MG
10 TABLET ORAL 3 TIMES DAILY PRN
Qty: 30 TABLET | Refills: 3 | Status: SHIPPED | OUTPATIENT
Start: 2024-05-03 | End: 2024-05-13

## 2024-05-03 NOTE — PROGRESS NOTES
Patient ID: Antonia Jeffers is a 44 y.o. female.    Chief Complaint: Annual Exam      Assessment:       1. Annual physical exam    2. Class 1 obesity due to excess calories without serious comorbidity with body mass index (BMI) of 34.0 to 34.9 in adult    3. VIOLETTA (obstructive sleep apnea): see HST 5/21, mild positional    4. 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 Fatty liver: see CT 8/23    6. Colon cancer screening          Plan:       1. Annual physical exam  -     Lipid Panel; Future; Expected date: 05/03/2024  -     TSH; Future; Expected date: 05/03/2024    2. Class 1 obesity due to excess calories without serious comorbidity with body mass index (BMI) of 34.0 to 34.9 in adult    3. VIOLETTA (obstructive sleep apnea): see HST 5/21, mild positional  Overview:  HST 5-4-2021 AHI 9     After discussing all option, pt agree to trial apap      4. 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 Fatty liver: see CT 8/23    6. Colon cancer screening  -     Ambulatory referral/consult to Endo Procedure ; Future; Expected date: 05/04/2024    Other orders  -     cyclobenzaprine (FLEXERIL) 10 MG tablet; Take 1 tablet (10 mg total) by mouth 3 (three) times daily as needed for Muscle spasms.  Dispense: 30 tablet; Refill: 3       Labs and review  Schedule CT scan ordered by Gastro  She will schedule colonoscopy, she does not feel that she needs an endoscopy  Gastro follow-up recommended  She request Flexeril for back pain; uses sparingly  Continue with slow and steady weight loss  She declines COVID shots        Subjective:   Annual exam    Lots of stress- mom in hospital, A fib; dad with dementia.    Seen by another provider not too long ago with some dizziness, workup in progress.  MRI of the brain was acceptable last month.    CT renal stone study was done in August of 2023.  No stones identified.  Fatty liver and mesenteric panniculitis were both seen.    Seen in Gastro around October of 2023 with the following  "recommendations:  "Assessment:  1. 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 Dyspepsia   3. Bloated abdomen   4. Chronic idiopathic constipation   5. Screen for colon cancer      We reviewed the imaging findings of mesenteric panniculitis.  We also reviewed her 2021 CT scan did not show any evidence of this.  We discussed that this is a rare entity with a somewhat unknown etiology.  We discussed that there can be infectious or other inflammatory bowel conditions that can be related, as well as other conditions, namely lymphoma although that seems to be very rare.  We usually follow this with interval imaging.  I do not believe treatment is warranted at this time given she is lacking any symptoms.    We do know that most of the time these findings will completely resolve on their own based on imaging.     Plan:  Orders Placed This Encounter  · C-Reactive Protein  · Comprehensive Metabolic Panel  · CBC Auto Differential  · Tissue Transglutaminase, IgA  · IgA  · sodium,potassium,mag sulfates (SUPREP BOWEL PREP KIT) 17.5-3.13-1.6 gram SolR  · Case Request Endoscopy: EGD (ESOPHAGOGASTRODUODENOSCOPY), COLONOSCOPY        Plan EGD and colonoscopy for further eval, and colon for screening as she is due     Blood work      Repeat CT scan around end of year, 3 - 6 months or so     If patient develops dominant nodule or other more aggressive imaging findings, we would then consider to submit her for surgery  /biopsy."    None of this has transpired.  She does not feel indicated but is willing to pursue a colonoscopy.    VIOLETTA diagnosed in the past, thought to be mild and positional.  Has difficulty with CPAP in the past.    Deliberate slow weight loss, BMI now 34.     Brain MRI April 2024  Mammogram December 2023  Gastro October 2023  CT renal stone study August 2023  Thyroid ultrasound August 2022  Pap smear August of 2021        Review of Systems   Constitutional:  Negative for fatigue.   HENT:  Negative for hearing loss.  "   Eyes:  Negative for visual disturbance.   Respiratory:  Negative for shortness of breath.    Cardiovascular:  Negative for chest pain.   Gastrointestinal:  Negative for abdominal pain, constipation and diarrhea.        Feels great.  No GERD.   Genitourinary:  Negative for dysuria, frequency and vaginal bleeding.        IUD in place, rarely has periods   Musculoskeletal:  Negative for joint swelling.   Skin:  Negative for rash.   Neurological:  Negative for weakness, light-headedness and headaches.   Psychiatric/Behavioral:  Negative for sleep disturbance.          Objective:      Physical Exam  Vitals and nursing note reviewed.   Constitutional:       Appearance: She is well-developed.   HENT:      Head: Normocephalic and atraumatic.      Right Ear: External ear normal.      Left Ear: External ear normal.      Nose: Nose normal.      Mouth/Throat:      Pharynx: No oropharyngeal exudate.   Eyes:      General: No scleral icterus.     Extraocular Movements: Extraocular movements intact.      Conjunctiva/sclera: Conjunctivae normal.   Neck:      Thyroid: Thyromegaly present.      Vascular: No JVD.   Cardiovascular:      Rate and Rhythm: Normal rate and regular rhythm.      Heart sounds: Normal heart sounds. No murmur heard.     No gallop.   Pulmonary:      Effort: Pulmonary effort is normal. No respiratory distress.      Breath sounds: Normal breath sounds. No wheezing.   Abdominal:      General: Bowel sounds are normal. There is no distension.      Palpations: Abdomen is soft. There is no mass.      Tenderness: There is no abdominal tenderness. There is no guarding or rebound.   Musculoskeletal:         General: No tenderness. Normal range of motion.      Cervical back: Normal range of motion and neck supple.   Lymphadenopathy:      Cervical: No cervical adenopathy.   Skin:     General: Skin is warm.      Findings: No erythema or rash.   Neurological:      General: No focal deficit present.      Mental Status: She is  alert and oriented to person, place, and time.      Cranial Nerves: No cranial nerve deficit.      Coordination: Coordination normal.   Psychiatric:         Behavior: Behavior normal.         Thought Content: Thought content normal.         Judgment: Judgment normal.             Health Maintenance Due   Topic Date Due    Pneumococcal Vaccines (Age 0-64) (1 of 2 - PCV) Never done    Colorectal Cancer Screening  Never done    COVID-19 Vaccine (1 - 2023-24 season) Never done

## 2024-05-06 ENCOUNTER — PATIENT MESSAGE (OUTPATIENT)
Dept: INTERNAL MEDICINE | Facility: CLINIC | Age: 44
End: 2024-05-06
Payer: COMMERCIAL

## 2024-05-06 LAB — TTG IGA SER-ACNC: 0.3 U/ML

## 2024-05-10 ENCOUNTER — TELEPHONE (OUTPATIENT)
Dept: ENDOSCOPY | Facility: HOSPITAL | Age: 44
End: 2024-05-10

## 2024-05-10 NOTE — TELEPHONE ENCOUNTER
Contacted the patient to schedule an endoscopy procedure(s) Colonsocopy. The patient did not answer the call and left a voice message requesting a call back.

## 2024-06-10 ENCOUNTER — PATIENT MESSAGE (OUTPATIENT)
Dept: INTERNAL MEDICINE | Facility: CLINIC | Age: 44
End: 2024-06-10
Payer: COMMERCIAL

## 2024-12-06 ENCOUNTER — TELEPHONE (OUTPATIENT)
Dept: OTOLARYNGOLOGY | Facility: CLINIC | Age: 44
End: 2024-12-06
Payer: COMMERCIAL

## 2025-04-30 ENCOUNTER — PATIENT MESSAGE (OUTPATIENT)
Dept: ADMINISTRATIVE | Facility: HOSPITAL | Age: 45
End: 2025-04-30
Payer: COMMERCIAL

## (undated) DEVICE — SCISSOR 5MMX35CM DIRECT DRIVE

## (undated) DEVICE — TRAY MINOR GEN SURG

## (undated) DEVICE — TROCAR ENDOPATH XCEL 12X100MM

## (undated) DEVICE — TUBING HF INSUFFLATION W/ FLTR

## (undated) DEVICE — KIT ANTIFOG

## (undated) DEVICE — SUT MCRYL PLUS 4-0 PS2 27IN

## (undated) DEVICE — APPLIER CLIP ENDO LIGAMAX 5MM

## (undated) DEVICE — BAG TISS RETRV MONARCH 10MM

## (undated) DEVICE — DRAPE CORETEMP FLD WRM 56X62IN

## (undated) DEVICE — ELECTRODE REM PLYHSV RETURN 9

## (undated) DEVICE — TROCAR ENDOPATH XCEL 5MM 7.5CM

## (undated) DEVICE — NDL HYPO REG 25G X 1 1/2

## (undated) DEVICE — TROCAR ENDOPATH XCEL 5X75MM

## (undated) DEVICE — SOL NS 1000CC

## (undated) DEVICE — IRRIGATOR ENDOSCOPY DISP.

## (undated) DEVICE — DRAPE STERI INSTRUMENT 1018

## (undated) DEVICE — DRAPE ABDOMINAL TIBURON 14X11

## (undated) DEVICE — BLADE SURG CARBON STEEL SZ11

## (undated) DEVICE — NDL 18GA X1 1/2 REG BEVEL

## (undated) DEVICE — ADHESIVE DERMABOND ADVANCED

## (undated) DEVICE — CLIP HEMO-LOK ML

## (undated) DEVICE — SEE MEDLINE ITEM 152622